# Patient Record
Sex: MALE | Race: WHITE | NOT HISPANIC OR LATINO | Employment: OTHER | URBAN - METROPOLITAN AREA
[De-identification: names, ages, dates, MRNs, and addresses within clinical notes are randomized per-mention and may not be internally consistent; named-entity substitution may affect disease eponyms.]

---

## 2017-01-11 ENCOUNTER — HOSPITAL ENCOUNTER (EMERGENCY)
Facility: HOSPITAL | Age: 62
Discharge: HOME | End: 2017-01-11
Attending: EMERGENCY MEDICINE
Payer: MEDICARE

## 2017-01-11 VITALS
SYSTOLIC BLOOD PRESSURE: 153 MMHG | WEIGHT: 185 LBS | HEART RATE: 85 BPM | RESPIRATION RATE: 20 BRPM | OXYGEN SATURATION: 99 % | TEMPERATURE: 98 F | DIASTOLIC BLOOD PRESSURE: 89 MMHG

## 2017-01-11 DIAGNOSIS — K52.9 GASTROENTERITIS: Primary | ICD-10-CM

## 2017-01-11 PROCEDURE — 99283 EMERGENCY DEPT VISIT LOW MDM: CPT

## 2017-01-11 RX ORDER — ONDANSETRON 4 MG/1
8 TABLET, ORALLY DISINTEGRATING ORAL EVERY 8 HOURS PRN
Qty: 10 TABLET | Refills: 3 | Status: SHIPPED | OUTPATIENT
Start: 2017-01-11 | End: 2017-03-20

## 2017-01-11 RX ORDER — LIDOCAINE 50 MG/G
1 PATCH TOPICAL EVERY 24 HOURS
COMMUNITY

## 2017-01-26 ENCOUNTER — ALLSCRIPTS OFFICE VISIT (OUTPATIENT)
Dept: OTHER | Facility: OTHER | Age: 62
End: 2017-01-26

## 2017-01-26 LAB
FLUAV AG SPEC QL IA: POSITIVE
INFLUENZA B AG (HISTORICAL): NEGATIVE

## 2017-03-20 ENCOUNTER — APPOINTMENT (EMERGENCY)
Dept: CT IMAGING | Facility: HOSPITAL | Age: 62
End: 2017-03-20
Payer: MEDICARE

## 2017-03-20 ENCOUNTER — HOSPITAL ENCOUNTER (EMERGENCY)
Facility: HOSPITAL | Age: 62
Discharge: HOME | End: 2017-03-20
Attending: EMERGENCY MEDICINE
Payer: MEDICARE

## 2017-03-20 VITALS
SYSTOLIC BLOOD PRESSURE: 137 MMHG | WEIGHT: 182.54 LBS | OXYGEN SATURATION: 97 % | TEMPERATURE: 98 F | RESPIRATION RATE: 18 BRPM | HEART RATE: 93 BPM | DIASTOLIC BLOOD PRESSURE: 75 MMHG

## 2017-03-20 DIAGNOSIS — M62.81 MUSCLE WEAKNESS: Primary | ICD-10-CM

## 2017-03-20 DIAGNOSIS — F41.9 ANXIETY: ICD-10-CM

## 2017-03-20 LAB
ALBUMIN SERPL BCP-MCNC: 4.1 G/DL (ref 3.5–5)
ALP SERPL-CCNC: 85 U/L (ref 46–116)
ALT SERPL W P-5'-P-CCNC: 33 U/L (ref 12–78)
ANION GAP SERPL CALCULATED.3IONS-SCNC: 9 MMOL/L (ref 4–13)
APTT PPP: 34 SECONDS (ref 24–36)
AST SERPL W P-5'-P-CCNC: 17 U/L (ref 5–45)
ATRIAL RATE: 107 BPM
BASOPHILS # BLD AUTO: 0.03 THOUSANDS/ΜL (ref 0–0.1)
BASOPHILS NFR BLD AUTO: 0 % (ref 0–1)
BILIRUB SERPL-MCNC: 0.2 MG/DL (ref 0.2–1)
BUN SERPL-MCNC: 12 MG/DL (ref 5–25)
CALCIUM SERPL-MCNC: 9.2 MG/DL (ref 8.3–10.1)
CHLORIDE SERPL-SCNC: 101 MMOL/L (ref 100–108)
CO2 SERPL-SCNC: 30 MMOL/L (ref 21–32)
CREAT SERPL-MCNC: 1.01 MG/DL (ref 0.6–1.3)
EOSINOPHIL # BLD AUTO: 0.11 THOUSAND/ΜL (ref 0–0.61)
EOSINOPHIL NFR BLD AUTO: 1 % (ref 0–6)
ERYTHROCYTE [DISTWIDTH] IN BLOOD BY AUTOMATED COUNT: 14 % (ref 11.6–15.1)
GFR SERPL CREATININE-BSD FRML MDRD: >60 ML/MIN/1.73SQ M
GLUCOSE SERPL-MCNC: 91 MG/DL (ref 65–140)
HCT VFR BLD AUTO: 48.6 % (ref 36.5–49.3)
HGB BLD-MCNC: 15.7 G/DL (ref 12–17)
INR PPP: 1.12 (ref 0.86–1.16)
LYMPHOCYTES # BLD AUTO: 2.19 THOUSANDS/ΜL (ref 0.6–4.47)
LYMPHOCYTES NFR BLD AUTO: 23 % (ref 14–44)
MAGNESIUM SERPL-MCNC: 2.3 MG/DL (ref 1.6–2.6)
MCH RBC QN AUTO: 27.7 PG (ref 26.8–34.3)
MCHC RBC AUTO-ENTMCNC: 32.3 G/DL (ref 31.4–37.4)
MCV RBC AUTO: 86 FL (ref 82–98)
MONOCYTES # BLD AUTO: 0.74 THOUSAND/ΜL (ref 0.17–1.22)
MONOCYTES NFR BLD AUTO: 8 % (ref 4–12)
NEUTROPHILS # BLD AUTO: 6.51 THOUSANDS/ΜL (ref 1.85–7.62)
NEUTS SEG NFR BLD AUTO: 68 % (ref 43–75)
NT-PROBNP SERPL-MCNC: 75 PG/ML
P AXIS: 87 DEGREES
PLATELET # BLD AUTO: 341 THOUSANDS/UL (ref 149–390)
PMV BLD AUTO: 9.2 FL (ref 8.9–12.7)
POTASSIUM SERPL-SCNC: 3.8 MMOL/L (ref 3.5–5.3)
PR INTERVAL: 142 MS
PROT SERPL-MCNC: 7.8 G/DL (ref 6.4–8.2)
PROTHROMBIN TIME: 14.2 SECONDS (ref 12–14.3)
QRS AXIS: 73 DEGREES
QRSD INTERVAL: 64 MS
QT INTERVAL: 304 MS
QTC INTERVAL: 405 MS
RBC # BLD AUTO: 5.67 MILLION/UL (ref 3.88–5.62)
SODIUM SERPL-SCNC: 140 MMOL/L (ref 136–145)
T WAVE AXIS: 70 DEGREES
TROPONIN I SERPL-MCNC: <0.02 NG/ML
VENTRICULAR RATE: 107 BPM
WBC # BLD AUTO: 9.58 THOUSAND/UL (ref 4.31–10.16)

## 2017-03-20 PROCEDURE — 84484 ASSAY OF TROPONIN QUANT: CPT | Performed by: EMERGENCY MEDICINE

## 2017-03-20 PROCEDURE — 93005 ELECTROCARDIOGRAM TRACING: CPT | Performed by: EMERGENCY MEDICINE

## 2017-03-20 PROCEDURE — 99284 EMERGENCY DEPT VISIT MOD MDM: CPT

## 2017-03-20 PROCEDURE — 83880 ASSAY OF NATRIURETIC PEPTIDE: CPT | Performed by: EMERGENCY MEDICINE

## 2017-03-20 PROCEDURE — 80053 COMPREHEN METABOLIC PANEL: CPT | Performed by: EMERGENCY MEDICINE

## 2017-03-20 PROCEDURE — 85610 PROTHROMBIN TIME: CPT | Performed by: EMERGENCY MEDICINE

## 2017-03-20 PROCEDURE — 70450 CT HEAD/BRAIN W/O DYE: CPT

## 2017-03-20 PROCEDURE — 85025 COMPLETE CBC W/AUTO DIFF WBC: CPT | Performed by: EMERGENCY MEDICINE

## 2017-03-20 PROCEDURE — 83735 ASSAY OF MAGNESIUM: CPT | Performed by: EMERGENCY MEDICINE

## 2017-03-20 PROCEDURE — 36415 COLL VENOUS BLD VENIPUNCTURE: CPT | Performed by: EMERGENCY MEDICINE

## 2017-03-20 PROCEDURE — 85730 THROMBOPLASTIN TIME PARTIAL: CPT | Performed by: EMERGENCY MEDICINE

## 2017-04-26 ENCOUNTER — HOSPITAL ENCOUNTER (EMERGENCY)
Facility: HOSPITAL | Age: 62
Discharge: HOME | End: 2017-04-26
Attending: EMERGENCY MEDICINE | Admitting: EMERGENCY MEDICINE
Payer: MEDICARE

## 2017-04-26 VITALS
TEMPERATURE: 97.9 F | RESPIRATION RATE: 18 BRPM | SYSTOLIC BLOOD PRESSURE: 157 MMHG | HEART RATE: 94 BPM | DIASTOLIC BLOOD PRESSURE: 82 MMHG | OXYGEN SATURATION: 98 %

## 2017-04-26 DIAGNOSIS — B35.6 TINEA CRURIS: Primary | ICD-10-CM

## 2017-04-26 PROCEDURE — 99281 EMR DPT VST MAYX REQ PHY/QHP: CPT

## 2017-04-26 RX ORDER — CLOTRIMAZOLE AND BETAMETHASONE DIPROPIONATE 10; .64 MG/G; MG/G
1 CREAM TOPICAL 2 TIMES DAILY
Qty: 28 G | Refills: 0 | Status: SHIPPED | OUTPATIENT
Start: 2017-04-26 | End: 2017-07-01

## 2017-05-11 ENCOUNTER — ALLSCRIPTS OFFICE VISIT (OUTPATIENT)
Dept: OTHER | Facility: OTHER | Age: 62
End: 2017-05-11

## 2017-05-16 ENCOUNTER — ALLSCRIPTS OFFICE VISIT (OUTPATIENT)
Dept: OTHER | Facility: OTHER | Age: 62
End: 2017-05-16

## 2017-07-01 ENCOUNTER — HOSPITAL ENCOUNTER (EMERGENCY)
Facility: HOSPITAL | Age: 62
Discharge: HOME | End: 2017-07-01
Attending: EMERGENCY MEDICINE | Admitting: EMERGENCY MEDICINE
Payer: MEDICARE

## 2017-07-01 VITALS
RESPIRATION RATE: 16 BRPM | HEART RATE: 97 BPM | SYSTOLIC BLOOD PRESSURE: 153 MMHG | WEIGHT: 184.08 LBS | TEMPERATURE: 98 F | DIASTOLIC BLOOD PRESSURE: 81 MMHG | OXYGEN SATURATION: 98 %

## 2017-07-01 DIAGNOSIS — B35.6 TINEA CRURIS: Primary | ICD-10-CM

## 2017-07-01 PROCEDURE — 99282 EMERGENCY DEPT VISIT SF MDM: CPT

## 2017-07-01 RX ORDER — CYCLOBENZAPRINE HCL 10 MG
10 TABLET ORAL 3 TIMES DAILY PRN
COMMUNITY

## 2017-10-19 ENCOUNTER — ALLSCRIPTS OFFICE VISIT (OUTPATIENT)
Dept: OTHER | Facility: OTHER | Age: 62
End: 2017-10-19

## 2017-10-19 LAB — S PYO AG THROAT QL: NEGATIVE

## 2017-10-20 NOTE — PROGRESS NOTES
Assessment  1  History of Acute upper respiratory infection (465 9) (J06 9)   2  Acute upper respiratory infection (465 9) (J06 9)   3  Allergic rhinitis (477 9) (J30 9)   4  Asthma, mild intermittent (493 90) (J45 20)    Plan  Acute upper respiratory infection    · Drink at least 6 glasses of water or juice a day ; Status:Complete;   Done: 69QII1797   · Good hand washing is one of the best ways to control the spread of germs ;  Status:Complete;   Done: 84XVN7532   · Use a cough medicine to help you get adequate rest ; Status:Complete;   Done:  11HPV1033   · Call [phone] if: The fever comes back after being normal for 2 days ;  Status:Complete;   Done: 08AQY3347   · Call [phone] if: The symptoms are not better in 7 days ; Status:Complete;   Done:  75JPS4917   · Call [phone] if: You have a productive cough and are bringing up secretions  (phlegm) ; Status:Complete;   Done: 27VHY7705   · Rapid StrepA- POC; Source:Throat; Status:Active - Perform Order; Requested  YAV:[id number]; Asthma    · ProAir  (90 Base) MCG/ACT Inhalation Aerosol Solution; take 2 puffs  every 4 to 6 hours as needed for wheeze  PMH: History of urticaria    · Benadryl Allergy 25 MG Oral Tablet; TAKE 1 TABLET AT BEDTIME    Discussion/Summary    He has mild sore throat and strep skin is negative he will continue symptomatic care for the respiratory infection  to come back if he has any new symptoms his breathing gets difficult or he has productive cough with fever chills,  The patient was counseled regarding instructions for management,-- impressions,-- importance of compliance with treatment  Possible side effects of new medications were reviewed with the patient/guardian today  The treatment plan was reviewed with the patient/guardian  The patient/guardian understands and agrees with the treatment plan      Chief Complaint  Throat pain, cough, and teary eyes        History of Present Illness  HPI: He complains of sore throat and cough , and sneezing 1 day,, denies fever , took night quil and robitussin to many children , in a game , and people or sneezing and coughing,denies any chest pain shortness of breath he says he needs refill on Benadryl he use it for allergies and he has asthma he use his inhaler sometimes and he used 1 time this morning,       Review of Systems    Constitutional: no fever or chills, feels well, no tiredness, no recent weight loss or weight gain  ENT: no complaints of earache, no loss of hearing, no nosebleeds or nasal discharge, no sore throat or hoarseness  Cardiovascular: no complaints of slow or fast heart rate, no chest pain, no palpitations, no leg claudication or lower extremity edema  Respiratory: no complaints of shortness of breath, no wheezing or cough, no dyspnea on exertion, no orthopnea or PND  Gastrointestinal: no complaints of abdominal pain, no constipation, no nausea or vomiting, no diarrhea or bloody stools  Genitourinary: no complaints of dysuria or incontinence, no hesitancy, no nocturia, no genital lesion, no inadequacy of penile erection  Musculoskeletal: no complaints of arthralgia, no myalgia, no joint swelling or stiffness, no limb pain or swelling  Integumentary: no complaints of skin rash or lesion, no itching or dry skin, no skin wounds  Neurological: no complaints of headache, no confusion, no numbness or tingling, no dizziness or fainting  ROS reviewed  Active Problems  1  Abnormal blood sugar (790 29) (R73 09)   2  Allergic rhinitis (477 9) (J30 9)   3  Allergic urticaria (708 0) (L50 0)   4  Annual physical exam (V70 0) (Z00 00)   5  Asthma (493 90) (J45 909)   6  Asthma, mild intermittent (493 90) (J45 20)   7  Cellulitis (682 9) (L03 90)   8  Chronic back pain (724 5,338 29) (M54 9,G89 29)   9  Colon cancer screening (V76 51) (Z12 11)   10  Hypercalcemia (275 42) (E83 52)   11  Hyperlipidemia (272 4) (E78 5)   12   Influenza A (487 1) (J10 1) 13  Insect bite of hip (916 4,E906 4) (E53 337P,O24  XXXA)   14  Sore throat (462) (J02 9)   15  Tinea cruris (110 3) (B35 6)    Past Medical History  1  History of _   2  History of Acute maxillary sinusitis (461 0) (J01 00)   3  History of Acute upper respiratory infection (465 9) (J06 9)   4  History of Acute upper respiratory infection (465 9) (J06 9)   5  History of Annual physical exam (V70 0) (Z00 00)   6  History of acute sinusitis (V12 69) (Z87 09)   7  History of allergic rhinitis (V12 69) (Z87 09)   8  History of temporomandibular joint disorder (V13 59) (Z87 39)   9  History of tinea cruris (V12 09) (Z86 19)   10  History of Myalgia And Myositis (729 1)   11  History of Well adult on routine health check (V70 0) (Z00 00)  Active Problems And Past Medical History Reviewed: The active problems and past medical history were reviewed and updated today  Family History  Mother    1  No pertinent family history  Father    2  Family history of   Family History Reviewed: The family history was reviewed and updated today  Social History   ·    · Never A Smoker   · No alcohol use   · No drug use   · On disability   · One child  The social history was reviewed and updated today  Current Meds   1  Ambien 10 MG Oral Tablet; 1 PO Q HS; Therapy: 98PUW9994 to  Requested for: 41FAU2705 Recorded   2  Benadryl Allergy 25 MG Oral Tablet; TAKE 1 TABLET AT BEDTIME; Therapy: 41LQL6577 to (Evaluate:2017)  Requested for: 27RRV5413; Last   Rx:83Uij7128 Ordered   3  ClonazePAM 0 5 MG Oral Tablet; 1 PO QID; Therapy: 06MOE7045 to  Requested for: 50VZV4676 Recorded   4  EpiPen 2-Jhonathan 0 3 MG/0 3ML Injection Solution Auto-injector; use as directed; Therapy: 46TBX6265 to (Last Rx:52Bgr6850)  Requested for: 42ZPB7750 Ordered   5  Fluconazole 150 MG Oral Tablet; 1 po one time and repeat after 1 wk; Therapy: 08OYJ1554 to (Last Rx:74Pbo4083)  Requested for: 74AHC0555 Ordered   6   Fluticasone Propionate 50 MCG/ACT Nasal Suspension; Use two (2) spray(s) into EACH   nostril DAILY; Therapy: 26QCJ6498 to (UQYCJGSX:02QVQ7863)  Requested for: 74JIU5784; Last   Rx:81Xce2400 Ordered   7  Nystatin 985747 UNIT/GM External Cream; APPLY 2-3 TIMES DAILY TO AFFECTED   AREA(S); Therapy: 78WMB1349 to (Evaluate:21Jun2017)  Requested for: 59NUD5789; Last   Rx:42Cvz2162 Ordered   8  ProAir  (90 Base) MCG/ACT Inhalation Aerosol Solution; take 2 puffs every 4 to 6   hours as needed for wheeze; Therapy: 58WBN0741 to (Evaluate:08Jun2016)  Requested for: 48ZJS2556; Last   Rx:90Uwt3641 Ordered    The medication list was reviewed and updated today  Allergies  1  Codeine Derivatives   2  Penicillins    Vitals   Recorded: 74TFE2742 11:36AM   Temperature 98 5 F   Heart Rate 92   Respiration 18   Systolic 254   Diastolic 82   Height 5 ft 8 5 in   Weight 188 lb    BMI Calculated 28 17   BSA Calculated 2   O2 Saturation 98     Physical Exam    Constitutional   General appearance: No acute distress, well appearing and well nourished  Eyes   Conjunctiva and lids: No swelling, erythema, or discharge  Ears, Nose, Mouth, and Throat   External inspection of ears and nose: Normal     Otoscopic examination: Tympanic membrance translucent with normal light reflex  Canals patent without erythema  Nasal mucosa, septum, and turbinates: Normal without edema or erythema  Oropharynx: Normal with no erythema, edema, exudate or lesions  Results/Data  PHQ-2 Adult Depression Screening 19Oct2017 11:46AM User, Ahs     Test Name Result Flag Reference   PHQ-2 Adult Depression Score 0     Over the last two weeks, how often have you been bothered by any of the following problems?   Little interest or pleasure in doing things: Not at all - 0  Feeling down, depressed, or hopeless: Not at all - 0   PHQ-2 Adult Depression Screening Negative         Signatures   Electronically signed by : CHRIS Oneal ; Oct 19 2017 12:14PM EST                       (Author)

## 2017-10-23 ENCOUNTER — GENERIC CONVERSION - ENCOUNTER (OUTPATIENT)
Dept: FAMILY MEDICINE CLINIC | Facility: CLINIC | Age: 62
End: 2017-10-23

## 2017-10-23 ENCOUNTER — GENERIC CONVERSION - ENCOUNTER (OUTPATIENT)
Dept: OTHER | Facility: OTHER | Age: 62
End: 2017-10-23

## 2018-01-13 VITALS
DIASTOLIC BLOOD PRESSURE: 80 MMHG | SYSTOLIC BLOOD PRESSURE: 140 MMHG | BODY MASS INDEX: 27.55 KG/M2 | HEART RATE: 110 BPM | RESPIRATION RATE: 16 BRPM | WEIGHT: 186 LBS | HEIGHT: 69 IN

## 2018-01-13 VITALS
RESPIRATION RATE: 18 BRPM | DIASTOLIC BLOOD PRESSURE: 82 MMHG | WEIGHT: 188 LBS | BODY MASS INDEX: 27.85 KG/M2 | TEMPERATURE: 98.5 F | HEART RATE: 92 BPM | SYSTOLIC BLOOD PRESSURE: 138 MMHG | OXYGEN SATURATION: 98 % | HEIGHT: 69 IN

## 2018-01-14 VITALS
DIASTOLIC BLOOD PRESSURE: 80 MMHG | BODY MASS INDEX: 27.7 KG/M2 | HEIGHT: 69 IN | SYSTOLIC BLOOD PRESSURE: 140 MMHG | TEMPERATURE: 98.4 F | HEART RATE: 100 BPM | RESPIRATION RATE: 16 BRPM | WEIGHT: 187 LBS

## 2018-01-14 VITALS
HEART RATE: 112 BPM | RESPIRATION RATE: 16 BRPM | BODY MASS INDEX: 27.55 KG/M2 | SYSTOLIC BLOOD PRESSURE: 140 MMHG | DIASTOLIC BLOOD PRESSURE: 80 MMHG | HEIGHT: 69 IN | TEMPERATURE: 98.8 F | WEIGHT: 186 LBS

## 2018-01-15 NOTE — RESULT NOTES
Verified Results  (1) COMPREHENSIVE METABOLIC PANEL 49BKX5598 03:70IC Kvng Khan     Test Name Result Flag Reference   GLUCOSE 108 mg/dL H 65-99   Fasting reference interval   UREA NITROGEN (BUN) 11 mg/dL  7-25   CREATININE 0 90 mg/dL  0 70-1 33   For patients >52years of age, the reference limit  for Creatinine is approximately 13% higher for people  identified as -American  eGFR NON-AFR  AMERICAN 93 mL/min/1 73m2  > OR = 60   eGFR AFRICAN AMERICAN 108 mL/min/1 73m2  > OR = 60   BUN/CREATININE RATIO   1-41   NOT APPLICABLE (calc)   SODIUM 139 mmol/L  135-146   POTASSIUM 4 1 mmol/L  3 5-5 3   CHLORIDE 104 mmol/L     CARBON DIOXIDE 24 mmol/L  19-30   CALCIUM 9 2 mg/dL  8 6-10 3   PROTEIN, TOTAL 6 8 g/dL  6 1-8 1   ALBUMIN 4 3 g/dL  3 6-5 1   GLOBULIN 2 5 g/dL (calc)  1 9-3 7   ALBUMIN/GLOBULIN RATIO 1 7 (calc)  1 0-2 5   BILIRUBIN, TOTAL 0 6 mg/dL  0 2-1 2   ALKALINE PHOSPHATASE 76 U/L     AST 16 U/L  10-35   ALT 18 U/L  9-46     (1) LIPID PANEL, FASTING 21Jun2016 09:14AM Kvng Bill     Test Name Result Flag Reference   CHOLESTEROL, TOTAL 227 mg/dL H 125-200   HDL CHOLESTEROL 44 mg/dL  > OR = 40   TRIGLICERIDES 014 mg/dL  <150   LDL-CHOLESTEROL 162 mg/dL (calc) H <130   Desirable range <100 mg/dL for patients with CHD or  diabetes and <70 mg/dL for diabetic patients with  known heart disease  CHOL/HDLC RATIO 5 2 (calc) H < OR = 5 0   NON HDL CHOLESTEROL 183 mg/dL (calc) H    Target for non-HDL cholesterol is 30 mg/dL higher than   LDL cholesterol target       (Q) CBC (H/H, RBC, INDICES, WBC, PLT) 08JVG3087 09:14AM Kvng Seamanony     Test Name Result Flag Reference   WHITE BLOOD CELL COUNT 8 1 Thousand/uL  3 8-10 8   RED BLOOD CELL COUNT 5 47 Million/uL  4 20-5 80   HEMOGLOBIN 15 0 g/dL  13 2-17 1   HEMATOCRIT 46 9 %  38 5-50 0   MCV 85 8 fL  80 0-100 0   MCH 27 5 pg  27 0-33 0   MCHC 32 0 g/dL  32 0-36 0   RDW 14 4 %  11 0-15 0   PLATELET COUNT 519 Thousand/uL  140-400   MPV 8 6 fL 7  5-11 5   WE RECEIVED YOUR HANDWRITTEN TEST ORDER AND  PERFORMED A HEMOGRAM WITH A PLATELET WITHOUT  A DIFFERENTIAL  IF THIS IS NOT WHAT YOU INTENDED  TO ORDER, PLEASE CONTACT YOUR LOCAL CLIENT SERVICE  REPRESENTATIVE IMMEDIATELY SO THAT WE CAN   ADJUST OUR BILLING APPROPRIATELY   YOU MAY ALSO   INQUIRE ABOUT ALTERNATIVE OR ADDITIONAL TESTING            (Q) TSH, 3RD GENERATION 21Jun2016 09:14AM Taryn Elkins   REPORT COMMENT:  FASTING:YES     Test Name Result Flag Reference   TSH 1 05 mIU/L  0 40-4 50

## 2018-01-22 VITALS — HEART RATE: 86 BPM

## 2018-01-22 VITALS
BODY MASS INDEX: 27.85 KG/M2 | DIASTOLIC BLOOD PRESSURE: 70 MMHG | HEART RATE: 108 BPM | SYSTOLIC BLOOD PRESSURE: 130 MMHG | TEMPERATURE: 98.8 F | WEIGHT: 188 LBS | OXYGEN SATURATION: 98 % | HEIGHT: 69 IN | RESPIRATION RATE: 18 BRPM

## 2018-02-01 ENCOUNTER — HOSPITAL ENCOUNTER (EMERGENCY)
Facility: HOSPITAL | Age: 63
Discharge: HOME | End: 2018-02-01
Attending: EMERGENCY MEDICINE | Admitting: EMERGENCY MEDICINE
Payer: MEDICARE

## 2018-02-01 VITALS
DIASTOLIC BLOOD PRESSURE: 97 MMHG | RESPIRATION RATE: 18 BRPM | TEMPERATURE: 98.2 F | BODY MASS INDEX: 27.72 KG/M2 | OXYGEN SATURATION: 100 % | WEIGHT: 185 LBS | HEART RATE: 86 BPM | SYSTOLIC BLOOD PRESSURE: 168 MMHG

## 2018-02-01 DIAGNOSIS — B37.89 CANDIDA RASH OF GROIN: Primary | ICD-10-CM

## 2018-02-01 PROCEDURE — 99282 EMERGENCY DEPT VISIT SF MDM: CPT

## 2018-02-01 RX ORDER — NYSTATIN 100000 U/G
OINTMENT TOPICAL 2 TIMES DAILY
Qty: 30 G | Refills: 0 | Status: SHIPPED | OUTPATIENT
Start: 2018-02-01 | End: 2018-02-26 | Stop reason: ALTCHOICE

## 2018-02-01 NOTE — DISCHARGE INSTRUCTIONS
Skin Yeast Infection   AMBULATORY CARE:   What do I need to know about a skin yeast infection? Yeast is normally present on the skin  Infection happens when you have too much yeast, or when it gets into a cut on your skin  Certain types of mold and fungus can cause a yeast infection  A skin yeast infection can appear anywhere on your skin or nail beds  Skin yeast infections are usually found on warm, moist parts of the body  Examples include between skin folds or under the breasts  Common symptoms include the following:  Signs and symptoms will depend on the type of yeast causing the infection, and where the infection is located  · Red, scaly skin    · Changes in skin color, especially a beefy red color    · Itching, dry skin    · Painful, cracking skin at the corners of your mouth    · Thick, discolored, chipping nails    · Skin lesions that may be red or purple and round    · Pus bumps  Seek care immediately if:   · You have signs of infection, such as pus, warmth or red streaks coming from the wound, or a fever  Contact your healthcare provider if:   · Your symptoms worsen or do not get better within 7 to 10 days  · You have new or returning signs of a skin yeast infection after treatment  · You have questions or concerns about your condition or care  Treatment for a skin yeast infection  may include antifungal medicine to treat the fungal infection  The medicine be given as a cream, ointment, or pill  Care for the skin near the infection:  You may only have discolored patches of skin, or areas that are dry and flaking  Care for these skin problems as directed by your healthcare provider  If you have painful skin or an open sore, you will need to protect the skin and prevent damage  You will also need to keep the skin dry as much as possible  Ask your healthcare provider how to care for your skin while the infection clears   The following are general guidelines for caring for painful or open skin:  · Keep the skin clean  Ask your healthcare provider if you should wash with mild soap and water  Do not use soap that contains alcohol  Alcohol can dry and irritate the skin and make symptoms worse  Your baby's healthcare provider may tell you to use diaper cream or ointment when you change his diaper  This will protect the skin and prevent moisture from collecting  · Keep the skin dry  Pat the area dry with a towel  Do not rub, because this may irritate the skin  If you have a skin yeast infection between skin folds, lift the top part gently and hold it while you dry between your skin folds  Always dry your feet completely after you swim or bathe, including between your toes  Dry your skin if you are sweating from exercise or exposure to heat  Use a clean towel each time to prevent spreading or continuing the infection  · Keep the skin protected  Ask your healthcare provider if you should cover the area with a bandage or leave it open  Check your skin each day to make sure you do not have new or worsening problems  You may need to have someone check the skin if you cannot see the area easily  Prevent another skin yeast infection:   · Do not share clothing or towels    · Wear shower shoes if you need to use a public shower    · Dry your feet completely after you bathe, and apply antifungal powder or cream as directed    · Put on socks before you get dressed so you do not spread fungus from your feet    · Wear light clothing that allows air to get to your skin    · Manage your weight to prevent skin folds where yeast can collect    · Manage diabetes    · Change your baby's diaper often, and keep the area clean and dry as much as possible    · Use a diaper cream or ointment that contains zinc oxide or dimethicone on your baby's diaper area as directed  Follow up with your healthcare provider as directed:  Write down your questions so you remember to ask them during your visits     © 2017 [facility] [address] [facility] is for End User's use only and may not be sold, redistributed or otherwise used for commercial purposes  All illustrations and images included in CareNotes® are the copyrighted property of [employer]  or [address]  The above information is an  only  It is not intended as medical advice for individual conditions or treatments  Talk to your doctor, nurse or pharmacist before following any medical regimen to see if it is safe and effective for you

## 2018-02-01 NOTE — ED PROVIDER NOTES
History  Chief Complaint   Patient presents with    Rash     Pt  with red itchy rash on upper legs, started this am  Pt  reports taking a benedryl and hydrocortisone this am       History provided by:  Patient  Rash   Location: bilateral ingunial/groin  Quality: itchiness and redness    Severity:  Moderate  Onset quality:  Gradual  Duration:  1 day  Timing:  Constant  Progression:  Unchanged  Chronicity:  New  Context comment:  Patient convinced it started after sitting on a public restroom toilet yesterday, patient very anxious about this  Relieved by:  Nothing  Worsened by:  Nothing  Ineffective treatments: Tried topical steroid and oral Benadryl  Associated symptoms: no abdominal pain, no diarrhea, no fever, no headaches, no nausea, no shortness of breath, no sore throat, not vomiting and not wheezing        Prior to Admission Medications   Prescriptions Last Dose Informant Patient Reported? Taking? ClonazePAM (KLONOPIN PO)   Yes No   Sig: Take by mouth  Menthol, Topical Analgesic, (Flexall 454) 16 % GEL   Yes No   Sig: Apply topically  cyclobenzaprine (FLEXERIL) 10 mg tablet   Yes No   Sig: Take 10 mg by mouth 3 (three) times a day as needed for muscle spasms   lidocaine (LIDODERM) 5 %   Yes No   Sig: Place 1 patch on the skin every 24 hours Remove & Discard patch within 12 hours or as directed by MD   terbinafine (LamISIL) 1 % cream   No No   Sig: Apply 1 application topically 2 (two) times a day for 7 days   zolpidem (AMBIEN CR) 12 5 MG CR tablet   Yes No   Sig: Take 12 5 mg by mouth daily at bedtime as needed for sleep  Facility-Administered Medications: None       Past Medical History:   Diagnosis Date    Anxiety     Asthma     Insomnia     Kidney stone     Pain     chronic neck,back, spine       Past Surgical History:   Procedure Laterality Date    KIDNEY STONE SURGERY      pt poor historian       History reviewed  No pertinent family history    I have reviewed and agree with the history as documented  Social History   Substance Use Topics    Smoking status: Never Smoker    Smokeless tobacco: Never Used    Alcohol use No        Review of Systems   Constitutional: Negative for activity change, chills, diaphoresis and fever  HENT: Negative for congestion, sinus pressure and sore throat  Eyes: Negative for pain and visual disturbance  Respiratory: Negative for cough, chest tightness, shortness of breath, wheezing and stridor  Cardiovascular: Negative for chest pain and palpitations  Gastrointestinal: Negative for abdominal distention, abdominal pain, constipation, diarrhea, nausea and vomiting  Genitourinary: Negative for dysuria and frequency  Musculoskeletal: Negative for neck pain and neck stiffness  Skin: Positive for rash  Neurological: Negative for dizziness, speech difficulty, light-headedness, numbness and headaches  Physical Exam  ED Triage Vitals [02/01/18 1002]   Temperature Pulse Respirations Blood Pressure SpO2   98 2 °F (36 8 °C) 86 18 168/97 100 %      Temp Source Heart Rate Source Patient Position - Orthostatic VS BP Location FiO2 (%)   Oral Monitor Sitting Right arm --      Pain Score       No Pain           Orthostatic Vital Signs  Vitals:    02/01/18 1002   BP: 168/97   Pulse: 86   Patient Position - Orthostatic VS: Sitting       Physical Exam   Constitutional: He is oriented to person, place, and time  He appears well-developed  No distress  HENT:   Head: Normocephalic and atraumatic  Eyes: Pupils are equal, round, and reactive to light  Neck: Normal range of motion  Neck supple  No tracheal deviation present  Pulmonary/Chest: Effort normal  No stridor  No respiratory distress  Musculoskeletal: Normal range of motion  Neurological: He is alert and oriented to person, place, and time  Skin: Skin is warm and dry  Rash noted  He is not diaphoretic  No erythema  No pallor     Patient with an erythematous rash by lying on all intrinsic areas in the groin, satellite lesions consistent with fungal infection   Psychiatric: His mood appears anxious  Vitals reviewed  ED Medications  Medications - No data to display    Diagnostic Studies  Results Reviewed     None                 No orders to display              Procedures  Procedures       Phone Contacts  ED Phone Contact    ED Course  ED Course                                MDM  Number of Diagnoses or Management Options  Candida rash of groin: new and requires workup  Diagnosis management comments: Patient extremely anxious and very repetitive in his questioning of if this could be caused by him sitting on a public toilet yesterday, I do believe this has nothing to do with this, I also suspect this is been there longer than 1 day, this appears to be a fungal infection, will discharge on nystatin cream, patient agrees to call and follow up this PCP in 1 week if symptoms do not resolve       Amount and/or Complexity of Data Reviewed  Review and summarize past medical records: yes      CritCare Time    Disposition  Final diagnoses:   Candida rash of groin     Time reflects when diagnosis was documented in both MDM as applicable and the Disposition within this note     Time User Action Codes Description Comment    2/1/2018 10:10 AM Justin Camacho [[id number]] Candida rash of groin       ED Disposition     ED Disposition Condition Comment    Discharge  Berneta Light discharge to home/self care  Condition at discharge: Good        Follow-up Information    None       Patient's Medications   Discharge Prescriptions    NYSTATIN (MYCOSTATIN) OINTMENT    Apply topically 2 (two) times a day       Start Date: 2/1/2018  End Date: --       Order Dose: --       Quantity: 30 g    Refills: 0     No discharge procedures on file      ED Provider  Electronically Signed by           Wesley Matos DO  02/01/18 1016

## 2018-02-15 ENCOUNTER — HOSPITAL ENCOUNTER (EMERGENCY)
Facility: HOSPITAL | Age: 63
Discharge: HOME | End: 2018-02-15
Attending: EMERGENCY MEDICINE | Admitting: EMERGENCY MEDICINE
Payer: MEDICARE

## 2018-02-15 VITALS
TEMPERATURE: 98.6 F | OXYGEN SATURATION: 100 % | SYSTOLIC BLOOD PRESSURE: 174 MMHG | WEIGHT: 182.98 LBS | BODY MASS INDEX: 27.42 KG/M2 | RESPIRATION RATE: 18 BRPM | HEART RATE: 99 BPM | DIASTOLIC BLOOD PRESSURE: 80 MMHG

## 2018-02-15 DIAGNOSIS — B35.4 TINEA CORPORIS: Primary | ICD-10-CM

## 2018-02-15 PROCEDURE — 99282 EMERGENCY DEPT VISIT SF MDM: CPT

## 2018-02-15 RX ORDER — CLOTRIMAZOLE 1 %
CREAM (GRAM) TOPICAL 2 TIMES DAILY
Qty: 60 G | Refills: 3 | Status: SHIPPED | OUTPATIENT
Start: 2018-02-15 | End: 2018-12-10

## 2018-02-15 NOTE — DISCHARGE INSTRUCTIONS
Diagnosis: tinea corporis fungal skin infection     - please do not scratch - do not want a secondary bacterial infection     - clotrimazole ointment- apply to area 2 times per day for next 2-3 weeks- or 1 week after itching is gone     - these can become recurrent     - if this continuess to become a problem-- please call  t he dermatologist to schedule an appt- sometimes can take awhile to get in     - please return to  The er for any new/ worsening/concerning symptoms to you

## 2018-02-15 NOTE — ED PROVIDER NOTES
History  Chief Complaint   Patient presents with    Rash     c/o rash located on bilateral inner thighs x 2 days  Pt had similar rash approx 2 weeks ago "and the cream you guys gave me took it away but it came back yesterday"  Pt denies N/V/D, fever/chills or any other complaints at present time  65 yr  Male with previous hx of skin rash 2-3 weeks ago-- given rx for anti- fungal -----  Return to er same -- bilateral itchy groin rash -- no other rash- no fever-systemic comps        History provided by:  Patient   used: No        Prior to Admission Medications   Prescriptions Last Dose Informant Patient Reported? Taking? ClonazePAM (KLONOPIN PO)   Yes No   Sig: Take by mouth  Menthol, Topical Analgesic, (Flexall 454) 16 % GEL   Yes No   Sig: Apply topically  cyclobenzaprine (FLEXERIL) 10 mg tablet   Yes No   Sig: Take 10 mg by mouth 3 (three) times a day as needed for muscle spasms   lidocaine (LIDODERM) 5 %   Yes No   Sig: Place 1 patch on the skin every 24 hours Remove & Discard patch within 12 hours or as directed by MD   nystatin (MYCOSTATIN) ointment   No No   Sig: Apply topically 2 (two) times a day   terbinafine (LamISIL) 1 % cream   No No   Sig: Apply 1 application topically 2 (two) times a day for 7 days   zolpidem (AMBIEN CR) 12 5 MG CR tablet   Yes No   Sig: Take 12 5 mg by mouth daily at bedtime as needed for sleep  Facility-Administered Medications: None       Past Medical History:   Diagnosis Date    Anxiety     Asthma     Insomnia     Kidney stone     Pain     chronic neck,back, spine    Psychiatric disorder        Past Surgical History:   Procedure Laterality Date    KIDNEY STONE SURGERY      pt poor historian       History reviewed  No pertinent family history  I have reviewed and agree with the history as documented      Social History   Substance Use Topics    Smoking status: Never Smoker    Smokeless tobacco: Never Used    Alcohol use No        Review of Systems   Constitutional: Negative  HENT: Negative  Eyes: Negative  Respiratory: Negative  Cardiovascular: Negative  Gastrointestinal: Negative  Endocrine: Negative  Genitourinary: Negative  Musculoskeletal: Negative  Skin: Positive for rash  Negative for color change, pallor and wound  Allergic/Immunologic: Negative  Neurological: Negative  Hematological: Negative  Psychiatric/Behavioral: Negative  Physical Exam  ED Triage Vitals [02/15/18 0810]   Temperature Pulse Respirations Blood Pressure SpO2   98 6 °F (37 °C) 99 18 (!) 174/80 100 %      Temp Source Heart Rate Source Patient Position - Orthostatic VS BP Location FiO2 (%)   Oral Monitor Lying Right arm --      Pain Score       No Pain           Orthostatic Vital Signs  Vitals:    02/15/18 0810   BP: (!) 174/80   Pulse: 99   Patient Position - Orthostatic VS: Lying       Physical Exam   Constitutional: He is oriented to person, place, and time  He appears well-developed and well-nourished  No distress  avss- htnsive-- pulse ox 100 % on ra- intepretation is normal-- no intervention    HENT:   Head: Normocephalic and atraumatic  Eyes: Conjunctivae and EOM are normal  Pupils are equal, round, and reactive to light  Right eye exhibits no discharge  Left eye exhibits no discharge  No scleral icterus  Mm pink   Neck: Normal range of motion  Neck supple  No JVD present  No tracheal deviation present  No thyromegaly present  Cardiovascular: Normal rate, regular rhythm, normal heart sounds and intact distal pulses  Exam reveals no gallop and no friction rub  No murmur heard  Pulmonary/Chest: Effort normal and breath sounds normal  No stridor  No respiratory distress  He has no wheezes  He has no rales  He exhibits no tenderness  Abdominal: Soft  Bowel sounds are normal  He exhibits no distension and no mass  There is no tenderness  There is no rebound and no guarding  No hernia     Genitourinary: Penis normal  Genitourinary Comments: Bilateral inguinal area dry erythema with satellite lesions-- no signs of cellulitis/ fournierre's -- normal  Perineal exam   Musculoskeletal: Normal range of motion  He exhibits no edema, tenderness or deformity  Lymphadenopathy:     He has no cervical adenopathy  Neurological: He is alert and oriented to person, place, and time  No cranial nerve deficit or sensory deficit  He exhibits normal muscle tone  Coordination normal    Skin: Capillary refill takes less than 2 seconds  Rash noted  He is not diaphoretic  There is erythema  No pallor  Psychiatric: He has a normal mood and affect  His behavior is normal  Judgment and thought content normal    Nursing note and vitals reviewed  ED Medications  Medications - No data to display    Diagnostic Studies  Results Reviewed     None                 No orders to display              Procedures  Procedures       Phone Contacts  ED Phone Contact    ED Course  ED Course                                MDM  CritCare Time    Disposition  Final diagnoses:   Tinea corporis     Time reflects when diagnosis was documented in both MDM as applicable and the Disposition within this note     Time User Action Codes Description Comment    2/15/2018  8:28 AM Precious MANDUJANO Add [B35 4] Tinea corporis       ED Disposition     ED Disposition Condition Comment    [facility]  Condition at discharge: Good        Follow-up Information    None       Patient's Medications   Discharge Prescriptions    CLOTRIMAZOLE (LOTRIMIN) 1 % CREAM    Apply topically 2 (two) times a day       Start Date: 2/15/2018 End Date: --       Order Dose: --       Quantity: 60 g    Refills: 3     No discharge procedures on file      ED Provider  Electronically Signed by           Antonio Whitley MD  02/15/18 1999

## 2018-02-22 DIAGNOSIS — J30.89 ALLERGIC RHINITIS DUE TO OTHER ALLERGIC TRIGGER, UNSPECIFIED CHRONICITY, UNSPECIFIED SEASONALITY: Primary | ICD-10-CM

## 2018-02-22 RX ORDER — FLUTICASONE PROPIONATE 50 MCG
SPRAY, SUSPENSION (ML) NASAL
Qty: 1 BOTTLE | Refills: 0 | Status: SHIPPED | OUTPATIENT
Start: 2018-02-22 | End: 2018-02-26

## 2018-02-26 ENCOUNTER — OFFICE VISIT (OUTPATIENT)
Dept: FAMILY MEDICINE CLINIC | Facility: CLINIC | Age: 63
End: 2018-02-26
Payer: MEDICARE

## 2018-02-26 VITALS
HEIGHT: 69 IN | TEMPERATURE: 98 F | BODY MASS INDEX: 27.4 KG/M2 | OXYGEN SATURATION: 99 % | RESPIRATION RATE: 16 BRPM | DIASTOLIC BLOOD PRESSURE: 72 MMHG | SYSTOLIC BLOOD PRESSURE: 120 MMHG | WEIGHT: 185 LBS | HEART RATE: 96 BPM

## 2018-02-26 DIAGNOSIS — T78.40XS ALLERGIC DISORDER, SEQUELA: ICD-10-CM

## 2018-02-26 DIAGNOSIS — J45.20 MILD INTERMITTENT ASTHMA WITHOUT COMPLICATION: Primary | ICD-10-CM

## 2018-02-26 DIAGNOSIS — J02.9 PHARYNGITIS, UNSPECIFIED ETIOLOGY: ICD-10-CM

## 2018-02-26 PROBLEM — T78.40XA ALLERGIC: Status: ACTIVE | Noted: 2018-02-26

## 2018-02-26 PROBLEM — B35.6 TINEA CRURIS: Status: ACTIVE | Noted: 2017-05-16

## 2018-02-26 PROBLEM — L50.0 ALLERGIC URTICARIA: Status: ACTIVE | Noted: 2017-05-11

## 2018-02-26 LAB — S PYO AG THROAT QL: NEGATIVE

## 2018-02-26 PROCEDURE — 99214 OFFICE O/P EST MOD 30 MIN: CPT | Performed by: FAMILY MEDICINE

## 2018-02-26 PROCEDURE — 87880 STREP A ASSAY W/OPTIC: CPT | Performed by: FAMILY MEDICINE

## 2018-02-26 RX ORDER — D-METHORPHAN/PE/ACETAMINOPHEN 5-325MG/15
25 LIQUID (ML) ORAL
Qty: 30 CAPSULE | Refills: 1 | Status: SHIPPED | OUTPATIENT
Start: 2018-02-26

## 2018-02-26 RX ORDER — FLUTICASONE PROPIONATE 50 MCG
SPRAY, SUSPENSION (ML) NASAL
COMMUNITY
Start: 2014-07-16 | End: 2018-02-26 | Stop reason: SDUPTHER

## 2018-02-26 RX ORDER — D-METHORPHAN/PE/ACETAMINOPHEN 5-325MG/15
LIQUID (ML) ORAL
Refills: 3 | COMMUNITY
Start: 2018-01-24 | End: 2018-02-26 | Stop reason: SDUPTHER

## 2018-02-26 RX ORDER — ALBUTEROL SULFATE 90 UG/1
AEROSOL, METERED RESPIRATORY (INHALATION)
COMMUNITY
Start: 2014-03-06 | End: 2018-02-26 | Stop reason: SDUPTHER

## 2018-02-26 RX ORDER — ZOLPIDEM TARTRATE 10 MG/1
TABLET ORAL
COMMUNITY
Start: 2018-02-25

## 2018-02-26 RX ORDER — ALBUTEROL SULFATE 90 UG/1
2 AEROSOL, METERED RESPIRATORY (INHALATION) EVERY 6 HOURS PRN
Qty: 1 INHALER | Refills: 0 | Status: SHIPPED | OUTPATIENT
Start: 2018-02-26 | End: 2019-01-14 | Stop reason: SDUPTHER

## 2018-02-26 RX ORDER — CLONAZEPAM 0.5 MG/1
TABLET, ORALLY DISINTEGRATING ORAL
COMMUNITY
Start: 2018-02-25 | End: 2018-02-26

## 2018-02-26 RX ORDER — FLUTICASONE PROPIONATE 50 MCG
2 SPRAY, SUSPENSION (ML) NASAL DAILY
Qty: 16 G | Refills: 2 | Status: SHIPPED | OUTPATIENT
Start: 2018-02-26 | End: 2018-07-01 | Stop reason: SDUPTHER

## 2018-02-26 RX ORDER — CLONAZEPAM 0.5 MG/1
TABLET ORAL 4 TIMES DAILY PRN
COMMUNITY
Start: 2011-12-19

## 2018-02-26 RX ORDER — IBUPROFEN 200 MG
200 TABLET ORAL EVERY 6 HOURS PRN
COMMUNITY
Start: 2017-10-23

## 2018-02-26 RX ORDER — EPINEPHRINE 0.3 MG/.3ML
INJECTION SUBCUTANEOUS
COMMUNITY
Start: 2017-05-11 | End: 2019-01-14 | Stop reason: SDUPTHER

## 2018-02-26 NOTE — ASSESSMENT & PLAN NOTE
Mild sore throat since yesterday and improved with gargles, strep test done in the office which is negative, continue symptomatic care

## 2018-02-26 NOTE — ASSESSMENT & PLAN NOTE
Asthma stable he is not using any maintenance inhaler and refill given on albuterol ProAir if needed

## 2018-02-26 NOTE — PROGRESS NOTES
Assessment/Plan:    Pharyngitis  Mild sore throat since yesterday and improved with gargles, strep test done in the office which is negative, continue symptomatic care      Asthma, mild intermittent  Asthma stable he is not using any maintenance inhaler and refill given on albuterol ProAir if needed    Allergic  He get seasonal allergies and takes Benadryl when needed and needs a refill on Flonase  /72   Pulse 96   Temp 98 °F (36 7 °C)   Resp 16   Ht 5' 8 5" (1 74 m)   Wt 83 9 kg (185 lb)   SpO2 99%   BMI 27 72 kg/m²        Diagnoses and all orders for this visit:    Mild intermittent asthma without complication  -     albuterol (PROAIR HFA) 90 mcg/act inhaler; Inhale 2 puffs every 6 (six) hours as needed for wheezing    Pharyngitis, unspecified etiology  -     POCT rapid strep A    Allergic disorder, sequela  -     fluticasone (FLONASE) 50 mcg/act nasal spray; 2 sprays into each nostril daily  -     [employer] ALLERGY 25 MG capsule; Take 1 capsule (25 mg total) by mouth daily at bedtime    Other orders  -     Mometasone Furoate (ASMANEX HFA) 100 MCG/ACT AERO; Inhale 2 puffs 2 (two) times a day  -     Discontinue: DiphenhydrAMINE HCl (BENADRYL ALLERGY PO); Take 1 tablet by mouth  -     Discontinue: clonazePAM (KlonoPIN) 0 5 MG disintegrating tablet;   -     Discontinue: [employer] ALLERGY 25 MG capsule; Take by mouth daily at bedtime  -     EPINEPHrine (EPIPEN 2-ARUN) 0 3 mg/0 3 mL SOAJ; Inject as directed  -     ibuprofen (MOTRIN) 600 mg tablet; Take by mouth  -     Discontinue: albuterol (PROAIR HFA) 90 mcg/act inhaler; Inhale  -     zolpidem (AMBIEN) 10 mg tablet;   -     clonazePAM (KlonoPIN) 0 5 mg tablet; Take by mouth 4 (four) times a day as needed  -     Discontinue: fluticasone (FLONASE) 50 mcg/act nasal spray; into each nostril          Blood pressure 120/72, pulse 96, temperature 98 °F (36 7 °C), resp  rate 16, height 5' 8 5" (1 74 m), weight 83 9 kg (185 lb), SpO2 99 %        Subjective:      Patient ID: Alexis Price is a 65 y o  male  Long with sore throat he also says he needs refill for asthma inhaler he just used for rescue and has been stable he says he does not use any Asmanex  He has seasonal allergies which have been stable and needs a refill on his Benadryl and Flonase  He says he had complete blood work less than year ago when he went to the ED      Sore Throat    This is a new problem  The current episode started yesterday  The problem has been gradually improving  Neither side of throat is experiencing more pain than the other  There has been no fever  The patient is experiencing no pain  Associated symptoms include headaches  Pertinent negatives include no abdominal pain, congestion, coughing, diarrhea, drooling, ear discharge, ear pain, hoarse voice, plugged ear sensation, neck pain, shortness of breath, stridor, swollen glands, trouble swallowing or vomiting  He has had no exposure to strep  He has tried gargles for the symptoms  The treatment provided mild relief  He had the flu vaccine      The following portions of the patient's history were reviewed and updated as appropriate: allergies, current medications, past family history, past medical history, past social history, past surgical history and problem list     Review of Systems   Constitutional: Negative for activity change, appetite change, chills, diaphoresis, fatigue, fever and unexpected weight change  HENT: Positive for sore throat  Negative for congestion, dental problem, drooling, ear discharge, ear pain, facial swelling, hearing loss, hoarse voice, mouth sores, nosebleeds, postnasal drip, rhinorrhea, sinus pain, sinus pressure, sneezing, trouble swallowing and voice change  Eyes: Negative for photophobia, pain, discharge, redness and itching  Respiratory: Negative for cough, chest tightness, shortness of breath, wheezing and stridor  Cardiovascular: Negative for chest pain, palpitations and leg swelling  Gastrointestinal: Negative for abdominal distention, abdominal pain, blood in stool, constipation, diarrhea, nausea and vomiting  Endocrine: Negative for cold intolerance, heat intolerance, polydipsia, polyphagia and polyuria  Genitourinary: Negative for dysuria, flank pain, frequency, hematuria and urgency  Musculoskeletal: Negative for arthralgias, back pain, myalgias and neck pain  Skin: Negative for color change and pallor  Allergic/Immunologic: Negative for environmental allergies and food allergies  Neurological: Positive for headaches  Negative for dizziness, weakness, light-headedness and numbness  Hematological: Negative for adenopathy  Does not bruise/bleed easily  Psychiatric/Behavioral: Negative for behavioral problems, sleep disturbance and suicidal ideas  The patient is not nervous/anxious  Objective:     Physical Exam   Constitutional: He is oriented to person, place, and time  He appears well-developed and well-nourished  HENT:   Head: Normocephalic and atraumatic  Right Ear: External ear normal    Left Ear: External ear normal    Nose: Nose normal    Mouth/Throat: Oropharynx is clear and moist  No oropharyngeal exudate  Eyes: Conjunctivae and EOM are normal  Pupils are equal, round, and reactive to light  Right eye exhibits no discharge  Left eye exhibits no discharge  No scleral icterus  Neck: Normal range of motion  Neck supple  No tracheal deviation present  No thyromegaly present  Cardiovascular: Normal rate, regular rhythm and normal heart sounds  No murmur heard  Pulmonary/Chest: Effort normal and breath sounds normal  No respiratory distress  He has no wheezes  He has no rales  Musculoskeletal: Normal range of motion  He exhibits no edema  Lymphadenopathy:     He has no cervical adenopathy  Neurological: He is alert and oriented to person, place, and time  He has normal reflexes  No cranial nerve deficit  Skin: Skin is warm  No rash noted   No erythema  No pallor  Psychiatric: He has a normal mood and affect   His behavior is normal  Judgment and thought content normal      rapid strep screen is negative

## 2018-07-01 DIAGNOSIS — T78.40XS ALLERGIC DISORDER, SEQUELA: ICD-10-CM

## 2018-07-01 RX ORDER — FLUTICASONE PROPIONATE 50 MCG
2 SPRAY, SUSPENSION (ML) NASAL DAILY
Qty: 1 BOTTLE | Refills: 2 | Status: SHIPPED | OUTPATIENT
Start: 2018-07-01 | End: 2018-10-15 | Stop reason: SDUPTHER

## 2018-10-15 DIAGNOSIS — T78.40XS ALLERGIC DISORDER, SEQUELA: ICD-10-CM

## 2018-10-15 RX ORDER — FLUTICASONE PROPIONATE 50 MCG
SPRAY, SUSPENSION (ML) NASAL
Qty: 1 BOTTLE | Refills: 2 | Status: SHIPPED | OUTPATIENT
Start: 2018-10-15 | End: 2019-01-14 | Stop reason: SDUPTHER

## 2018-12-10 ENCOUNTER — HOSPITAL ENCOUNTER (EMERGENCY)
Facility: HOSPITAL | Age: 63
Discharge: HOME | End: 2018-12-10
Attending: EMERGENCY MEDICINE
Payer: MEDICARE

## 2018-12-10 VITALS
RESPIRATION RATE: 18 BRPM | BODY MASS INDEX: 27.87 KG/M2 | TEMPERATURE: 98 F | DIASTOLIC BLOOD PRESSURE: 92 MMHG | OXYGEN SATURATION: 98 % | SYSTOLIC BLOOD PRESSURE: 161 MMHG | HEART RATE: 108 BPM | WEIGHT: 186 LBS

## 2018-12-10 DIAGNOSIS — B35.6 TINEA CRURIS: Primary | ICD-10-CM

## 2018-12-10 DIAGNOSIS — T78.40XA ALLERGIC DISORDER, INITIAL ENCOUNTER: ICD-10-CM

## 2018-12-10 PROCEDURE — 99282 EMERGENCY DEPT VISIT SF MDM: CPT

## 2018-12-10 RX ORDER — PREDNISONE 1 MG/1
TABLET ORAL
Qty: 15 TABLET | Refills: 0 | Status: SHIPPED | OUTPATIENT
Start: 2018-12-10 | End: 2019-01-14 | Stop reason: ALTCHOICE

## 2018-12-10 RX ORDER — CLOTRIMAZOLE 1 %
CREAM (GRAM) TOPICAL
Qty: 28 G | Refills: 0 | Status: SHIPPED | OUTPATIENT
Start: 2018-12-10 | End: 2019-01-08

## 2018-12-10 RX ORDER — DIPHENHYDRAMINE HCL 25 MG
25 CAPSULE ORAL EVERY 6 HOURS PRN
Qty: 20 CAPSULE | Refills: 0 | Status: SHIPPED | OUTPATIENT
Start: 2018-12-10 | End: 2019-01-14 | Stop reason: SDUPTHER

## 2018-12-11 NOTE — DISCHARGE INSTRUCTIONS
General Allergic Reaction   AMBULATORY CARE:   A general allergic reaction  is your body's response to an allergen  Allergens include medicines, food, insect stings, animal dander, mold, latex, chemicals, and dust mites  Pollen from trees, grass, and weeds can also cause an allergic reaction  Seek care immediately if:   · You have a skin rash, hives, swelling, or itching that gets worse  · You have trouble breathing, shortness of breath, wheezing, or coughing  · Your throat tightens, or your lips or tongue swell  · You have trouble swallowing or speaking  · You have dizziness, lightheadedness, fainting, or confusion  · You have nausea, vomiting, diarrhea, or abdominal cramps  · You have chest pain or tightness  Contact your healthcare provider if:   · You have questions or concerns about your condition or care  Treatment for a general allergic reaction  may include medicines to relieve certain allergy symptoms such as itching, sneezing, and swelling  You may take them as a pill or use drops in your nose or eyes  Topical treatments may be given to put directly on your skin to help decrease itching or swelling  Manage allergic reactions:   · Avoid the allergen  that you think may have caused your allergic reaction  · Use cold compresses  on your skin or eyes if they were affected by the allergic reaction  Cold compresses may help to soothe your skin or eyes  · Rinse your nasal passages  with a saline solution  Daily rinsing may help clear your nose of allergens  · Do not smoke  Your allergy symptoms may decrease if you are not around smoke  Nicotine and other chemicals in cigarettes and cigars can also cause lung damage  Ask your healthcare provider for information if you currently smoke and need help to quit  E-cigarettes or smokeless tobacco still contain nicotine  Talk to your healthcare provider before you use these products    Follow up with your healthcare provider as directed:  Write down your questions so you remember to ask them during your visits  © 2017 [employer] Information is for End User's use only and may not be sold, redistributed or otherwise used for commercial purposes  All illustrations and images included in CareNotes® are the copyrighted property of [employer]  or [employer]  The above information is an  only  It is not intended as medical advice for individual conditions or treatments  Talk to your doctor, nurse or pharmacist before following any medical regimen to see if it is safe and effective for you  Jock Itch   WHAT YOU NEED TO KNOW:   Jock itch is a rash on your groin  The groin is the area between your abdomen and legs  Jock itch is usually easy to treat and prevent  It is caused by a fungus  The fungus also causes athlete's foot  DISCHARGE INSTRUCTIONS:   Medicines:   · Fungus medicine:  Jock itch is usually treated with a cream that kills the fungus  Apply the cream to the rash and the skin around it as directed  You may need to apply the cream 1 to 2 times each day for 2 weeks  You may be given this medicine as a pill if the cream does not help  · Take your medicine as directed  Contact your healthcare provider if you think your medicine is not helping or if you have side effects  Tell him or her if you are allergic to any medicine  Keep a list of the medicines, vitamins, and herbs you take  Include the amounts, and when and why you take them  Bring the list or the pill bottles to follow-up visits  Carry your medicine list with you in case of an emergency  Manage and prevent jock itch:   · Keep the area dry  · Wear light, loose clothes  Do not share clothes  · Do not wear wet clothes for long periods  Wash athletic gear after you play sports  · Bathe daily  Dry your skin completely after you bathe  Apply cream or powder after you bathe as directed if you get jock itch often   Wash your hands often to prevent the spread of the fungus  You may want to wear disposable gloves when you clean your feet  The gloves will keep the fungus from moving from your feet to your hands  · Use separate towels to dry each part of your body  Put your socks on before you put on your underwear so you do not spread the fungus from your feet to your groin  · Lose weight if you weigh more than your healthcare provider suggests  Follow up with your healthcare provider or skin specialist as directed: Your healthcare provider will examine your rash to see how well it is healing  If you take medicine as a pill, he may draw blood to check how your liver and kidneys are working  Write down your questions so you remember to ask them during your visits  Contact your healthcare provider or skin specialist if:   · Your signs and symptoms do not get better within 2 weeks of treatment  · Your signs and symptoms get worse or come back after treatment  · You get a rash on a part of your body other than your groin  · You have a fever  · You have questions or concerns about your condition or care  © 2017 [address] [facility] Information is for End User's use only and may not be sold, redistributed or otherwise used for commercial purposes  All illustrations and images included in CareNotes® are the copyrighted property of [employer]  or [employer]  The above information is an  only  It is not intended as medical advice for individual conditions or treatments  Talk to your doctor, nurse or pharmacist before following any medical regimen to see if it is safe and effective for you

## 2018-12-11 NOTE — ED PROVIDER NOTES
History  Chief Complaint   Patient presents with    Rash     Pt states he used a public restroom today around 1600,  afterwards noticed rash on hands and groin  71-year-old male with past medical history of fungal infection, asthma, anxiety, renal stones, who presents to the emergency department for itching and rash in the bilateral thighs that started today  Patient states that he stop by a truck stop to use the public restroom, and noticed that he had a new itchy, burning, erythematous rash to his bilateral upper thighs  Does not affect his penis or testicles  States that they looks like hives initially, but following use of hydrocortisone cream, Chem on lotion and Benadryl, the rash improved and is now dry and scaly  He presents to the emergency department as he is concerned that he might be having reaction to the cleaning agent or a bacterial infection from the bathroom  States that this also feels like previous episodes of fungal rash that he has visited the emergency department for on multiple occasions  Denies fevers, chills, urinary symptoms, nausea, vomiting  History provided by:  Patient and medical records   used: No    Rash   Associated symptoms: no abdominal pain, no diarrhea, no fever, no headaches, no joint pain, no myalgias, no nausea, no shortness of breath, no sore throat, not vomiting and not wheezing        Prior to Admission Medications   Prescriptions Last Dose Informant Patient Reported? Taking? [facility] ALL[facility] 25 MG capsule   No No   Sig: Take 1 capsule (25 mg total) by mouth daily at bedtime   EPINEPHrine (EPIPEN 2-ARUN) 0 3 mg/0 3 mL SOAJ   Yes No   Sig: Inject as directed   Menthol, Topical Analgesic, (Flexall 454) 16 % GEL   Yes No   Sig: Apply topically     Mometasone Furoate (ASMANEX HFA) 100 MCG/ACT AERO   Yes No   Sig: Inhale 2 puffs 2 (two) times a day   albuterol (PROAIR HFA) 90 mcg/act inhaler   No No   Sig: Inhale 2 puffs every 6 (six) hours as needed for wheezing   clonazePAM (KlonoPIN) 0 5 mg tablet   Yes No   Sig: Take by mouth 4 (four) times a day as needed   cyclobenzaprine (FLEXERIL) 10 mg tablet   Yes No   Sig: Take 10 mg by mouth 3 (three) times a day as needed for muscle spasms   fluticasone (FLONASE) 50 mcg/act nasal spray   No No   Sig: SPRAY 2 SPRAYS INTO EACH NOSTRIL EVERY DAY   ibuprofen (MOTRIN) 600 mg tablet   Yes No   Sig: Take by mouth   lidocaine (LIDODERM) 5 %   Yes No   Sig: Place 1 patch on the skin every 24 hours Remove & Discard patch within 12 hours or as directed by MD   zolpidem (AMBIEN) 10 mg tablet   Yes No      Facility-Administered Medications: None       Past Medical History:   Diagnosis Date    Allergic rhinitis 01/13/2011    Anxiety     Asthma     Insomnia     Kidney stone     Myalgia and Myositis 11/13/2007    Pain     chronic neck,back, spine    Psychiatric disorder     Temporomandibular joint disorder 01/06/2009       Past Surgical History:   Procedure Laterality Date    KIDNEY STONE SURGERY      pt poor historian       Family History   Problem Relation Age of Onset    No Known Problems Mother      I have reviewed and agree with the history as documented  Social History   Substance Use Topics    Smoking status: Never Smoker    Smokeless tobacco: Never Used    Alcohol use No        Review of Systems   Constitutional: Negative for chills and fever  HENT: Negative for congestion, ear pain, postnasal drip, rhinorrhea, sinus pain, sinus pressure, sore throat and trouble swallowing  Respiratory: Negative for cough, chest tightness, shortness of breath and wheezing  Cardiovascular: Negative for chest pain, palpitations and leg swelling  Gastrointestinal: Negative for abdominal pain, anal bleeding, constipation, diarrhea, nausea and vomiting  Genitourinary: Negative for dysuria, flank pain, frequency, hematuria, penile pain, penile swelling, scrotal swelling, testicular pain and urgency  Musculoskeletal: Negative for arthralgias, back pain, gait problem, joint swelling, myalgias, neck pain and neck stiffness  Skin: Positive for rash  Negative for color change, pallor and wound  Neurological: Negative for dizziness, syncope, weakness, light-headedness, numbness and headaches  Physical Exam  Physical Exam   Constitutional: He is oriented to person, place, and time  He appears well-developed and well-nourished  No distress  HENT:   Head: Normocephalic and atraumatic  Eyes: Pupils are equal, round, and reactive to light  Conjunctivae and EOM are normal    Neck: Normal range of motion  Neck supple  Cardiovascular: Normal rate, regular rhythm and intact distal pulses  Pulmonary/Chest: Effort normal  No respiratory distress  Abdominal: Soft  There is no tenderness  Musculoskeletal: Normal range of motion  He exhibits no edema or tenderness  Neurological: He is alert and oriented to person, place, and time  Skin: Skin is warm and dry  Capillary refill takes less than 2 seconds  Rash (Dry erythematous scaly rash to the bilateral inguinal region  Does not involve the groin or perineal region ) noted  He is not diaphoretic  Psychiatric: He has a normal mood and affect  His behavior is normal    Nursing note and vitals reviewed        Vital Signs  ED Triage Vitals [12/10/18 1916]   Temperature Pulse Respirations Blood Pressure SpO2   98 °F (36 7 °C) (!) 108 18 161/92 98 %      Temp Source Heart Rate Source Patient Position - Orthostatic VS BP Location FiO2 (%)   Oral Monitor Sitting Right arm --      Pain Score       No Pain           Vitals:    12/10/18 1916   BP: 161/92   Pulse: (!) 108   Patient Position - Orthostatic VS: Sitting       Visual Acuity      ED Medications  Medications - No data to display    Diagnostic Studies  Results Reviewed     None                 No orders to display              Procedures  Procedures       Phone Contacts  ED Phone Contact    ED Course MDM  Number of Diagnoses or Management Options  Allergic disorder, initial encounter:   Tinea cruris:   Diagnosis management comments: Differential Diagnosis includes but is not limited to:   Fungal infection, allergic reaction, cellulitis  Patient likely has a fungal infection  Given clotrimazole cream as he states that has has helped previously  Will also treat patient for possible allergic reaction as he states that they were hives involved previously  Given prescription for prednisone taper and Benadryl  Instructed to follow up with primary care doctor for re-evaluation  No signs of anaphylaxis in the emergency department  CritCare Time    Disposition  Final diagnoses:   Tinea cruris   Allergic disorder, initial encounter     Time reflects when diagnosis was documented in both MDM as applicable and the Disposition within this note     Time User Action Codes Description Comment    12/10/2018  7:31 PM Anastacio Hoyos Add [B35 6] Tinea cruris     12/10/2018  7:34 PM Anali Amin Add [T78 40XA] Allergic disorder, initial encounter       ED Disposition     ED Disposition Condition Comment    Discharge  Carole Schaeffer discharge to home/self care  Condition at discharge: Good        Follow-up Information     Follow up With Specialties Details Why Contact Info    Selvin Lee MD Family Medicine In 1 week As needed, If symptoms worsen [address]  [address]  [phone]            Discharge Medication List as of 12/10/2018  7:36 PM      START taking these medications    Details   clotrimazole (LOTRIMIN) 1 % cream Apply to affected area 2 times daily, Print      !! diphenhydrAMINE (BENADRYL) 25 mg capsule Take 1 capsule (25 mg total) by mouth every 6 (six) hours as needed for itching, Starting Mon 12/10/2018, Print      predniSONE 5 mg tablet Day 1: Take 5 tablets  Day 2: Take 4 tablets  Day 3: Take 3 tablets  Day 2: Take 2 Tablets  Day 1: Take 1 tablet  , Print       ! ! - Potential duplicate medications found  Please discuss with provider  CONTINUE these medications which have NOT CHANGED    Details   albuterol (PROAIR HFA) 90 mcg/act inhaler Inhale 2 puffs every 6 (six) hours as needed for wheezing, Starting Mon 2/26/2018, Normal      clonazePAM (KlonoPIN) 0 5 mg tablet Take by mouth 4 (four) times a day as needed, Starting Mon 12/19/2011, Historical Med      !! [facility] ALL[facility]GY 25 MG capsule Take 1 capsule (25 mg total) by mouth daily at bedtime, Starting Mon 2/26/2018, Normal      cyclobenzaprine (FLEXERIL) 10 mg tablet Take 10 mg by mouth 3 (three) times a day as needed for muscle spasms, Historical Med      EPINEPHrine (EPIPEN 2-ARUN) 0 3 mg/0 3 mL SOAJ Inject as directed, Starting Thu 5/11/2017, Historical Med      fluticasone (FLONASE) 50 mcg/act nasal spray SPRAY 2 SPRAYS INTO EACH NOSTRIL EVERY DAY, Normal      ibuprofen (MOTRIN) 600 mg tablet Take by mouth, Starting Mon 10/23/2017, Historical Med      lidocaine (LIDODERM) 5 % Place 1 patch on the skin every 24 hours Remove & Discard patch within 12 hours or as directed by MD, Until Discontinued, Historical Med      Menthol, Topical Analgesic, (Flexall 454) 16 % GEL Apply topically  , Until Discontinued, Historical Med      Mometasone Furoate [facility] DISTRICT HFA) 100 MCG/ACT AERO Inhale 2 puffs 2 (two) times a day, Starting Mon 10/23/2017, Historical Med      zolpidem (AMBIEN) 10 mg tablet Starting Sun 2/25/2018, Historical Med       !! - Potential duplicate medications found  Please discuss with provider  No discharge procedures on file      ED Provider  Electronically Signed by           Brianna Peirson PA-C  12/10/18 1689

## 2019-01-08 ENCOUNTER — HOSPITAL ENCOUNTER (EMERGENCY)
Facility: HOSPITAL | Age: 64
Discharge: HOME | End: 2019-01-08
Attending: EMERGENCY MEDICINE
Payer: MEDICARE

## 2019-01-08 VITALS
TEMPERATURE: 98.7 F | BODY MASS INDEX: 28.32 KG/M2 | WEIGHT: 189 LBS | OXYGEN SATURATION: 98 % | SYSTOLIC BLOOD PRESSURE: 172 MMHG | DIASTOLIC BLOOD PRESSURE: 86 MMHG | HEART RATE: 109 BPM | RESPIRATION RATE: 19 BRPM

## 2019-01-08 DIAGNOSIS — R21 RASH AND NONSPECIFIC SKIN ERUPTION: ICD-10-CM

## 2019-01-08 DIAGNOSIS — B35.6 TINEA CRURIS: ICD-10-CM

## 2019-01-08 DIAGNOSIS — L30.9 DERMATITIS: Primary | ICD-10-CM

## 2019-01-08 PROCEDURE — 99282 EMERGENCY DEPT VISIT SF MDM: CPT

## 2019-01-08 RX ORDER — FAMOTIDINE 20 MG/1
20 TABLET, FILM COATED ORAL ONCE
Status: COMPLETED | OUTPATIENT
Start: 2019-01-08 | End: 2019-01-08

## 2019-01-08 RX ORDER — CLOTRIMAZOLE 1 %
CREAM (GRAM) TOPICAL
Qty: 28 G | Refills: 0 | Status: SHIPPED | OUTPATIENT
Start: 2019-01-08

## 2019-01-08 RX ORDER — PREDNISONE 20 MG/1
60 TABLET ORAL ONCE
Status: COMPLETED | OUTPATIENT
Start: 2019-01-08 | End: 2019-01-08

## 2019-01-08 RX ORDER — PREDNISONE 20 MG/1
20 TABLET ORAL 3 TIMES DAILY
Qty: 12 TABLET | Refills: 0 | Status: SHIPPED | OUTPATIENT
Start: 2019-01-08 | End: 2019-01-12

## 2019-01-08 RX ADMIN — PREDNISONE 60 MG: 20 TABLET ORAL at 16:06

## 2019-01-08 RX ADMIN — FAMOTIDINE 20 MG: 20 TABLET ORAL at 16:06

## 2019-01-08 NOTE — DISCHARGE INSTRUCTIONS
Dermatitis   WHAT YOU NEED TO KNOW:   Dermatitis is skin inflammation  You may have an itchy rash, redness, or swelling  You may also have bumps or blisters that crust over or ooze clear fluid  Dermatitis can be caused by allergens such as dust mites, pet dander, pollen, and certain foods  It can also develop when something touches your skin and irritates it or causes an allergic reaction  Examples include soaps, chemicals, latex, and poison ivy  DISCHARGE INSTRUCTIONS:   Call 911 if you have any of the following symptoms of anaphylaxis:   · Sudden trouble breathing    · Throat swelling and tightness    · Dizziness, lightheadedness, fainting, or confusion  Return to the emergency department if:   · You develop a fever or have red streaks going up your arm or leg  · Your rash gets more swollen, red, or hot  Contact your healthcare provider if:   · Your skin blisters, oozes white or yellow pus, or has a foul-smelling discharge  · Your rash spreads or does not get better, even after treatment  · You have questions or concerns about your condition or care  Medicines:   · Medicines  help decrease itching and inflammation, or treat a bacterial infection  They may be given as a topical cream, shot, or a pill  · Take your medicine as directed  Contact your healthcare provider if you think your medicine is not helping or if you have side effects  Tell him of her if you are allergic to any medicine  Keep a list of the medicines, vitamins, and herbs you take  Include the amounts, and when and why you take them  Bring the list or the pill bottles to follow-up visits  Carry your medicine list with you in case of an emergency  Apply a cool compress to your rash: This will help soothe your skin  Keep your skin moist:  Rub unscented cream or lotion on your skin to prevent dryness and itching  Do this right after a lukewarm bath or shower when your skin is still damp    Avoid skin irritants:  Do not use skin irritants, such as makeup, hair products, soaps, and cleansers  Use products that do not contain fragrances or dye  Follow up with your healthcare provider as directed:  Write down your questions so you remember to ask them during your visits  © 2017 [employer] [employer] Information is for End User's use only and may not be sold, redistributed or otherwise used for commercial purposes  All illustrations and images included in CareNotes® are the copyrighted property of [employer]  or [employer]  The above information is an  only  It is not intended as medical advice for individual conditions or treatments  Talk to your doctor, nurse or pharmacist before following any medical regimen to see if it is safe and effective for you

## 2019-01-08 NOTE — ED NOTES
Discharge instruction given to patient  No questions or concerns at this time  Patient able to ambulate out without difficulty        Angel Mcknight RN  01/08/19 0206

## 2019-01-08 NOTE — ED PROVIDER NOTES
History  Chief Complaint   Patient presents with    Rash     c/o red rash located on bilateral hands, and bilateral inner thighs, and eye watering/itching s/p after taking outdoor xmas lights down x 1 hr ago  Pt took Benadryl approx 40 minutes PTA  Pt stated "the rash looks better but I'm affraid it'll spend to my private parts"  58 yo M presenting with rash occurring just prior to arrival  Pt reports he has very sensitive skin and frequent rashes secondary to allergies  Pt reports today he was taking down the shashi lights off of his trees and bushes in the front yard just before noticing the rash  Pt reports 'i think they came by a week or so ago and sprayed them with insecticide or something'  Pt reports coming inside and changing his clothes as his jeans were wet from the trees  He reports then noticing an itchy rash over dorsum b/l hands and anterior b/l thighs  He reports taking benadryl and then coming to the ED  He denies any SOB, cough, wheezing or stridor  Prior to Admission Medications   Prescriptions Last Dose Informant Patient Reported? Taking? [facility] ALLERGY 25 MG capsule   No No   Sig: Take 1 capsule (25 mg total) by mouth daily at bedtime   EPINEPHrine (EPIPEN 2-ARUN) 0 3 mg/0 3 mL SOAJ   Yes No   Sig: Inject as directed   Menthol, Topical Analgesic, (Flexall 454) 16 % GEL   Yes No   Sig: Apply topically     Mometasone Furoate (ASMANEX HFA) 100 MCG/ACT AERO   Yes No   Sig: Inhale 2 puffs 2 (two) times a day   albuterol (PROAIR HFA) 90 mcg/act inhaler   No No   Sig: Inhale 2 puffs every 6 (six) hours as needed for wheezing   clonazePAM (KlonoPIN) 0 5 mg tablet   Yes No   Sig: Take by mouth 4 (four) times a day as needed   clotrimazole (LOTRIMIN) 1 % cream   No No   Sig: Apply to affected area 2 times daily   cyclobenzaprine (FLEXERIL) 10 mg tablet   Yes No   Sig: Take 10 mg by mouth 3 (three) times a day as needed for muscle spasms   diphenhydrAMINE (BENADRYL) 25 mg capsule   No No Sig: Take 1 capsule (25 mg total) by mouth every 6 (six) hours as needed for itching   fluticasone (FLONASE) 50 mcg/act nasal spray   No No   Sig: SPRAY 2 SPRAYS INTO EACH NOSTRIL EVERY DAY   ibuprofen (MOTRIN) 600 mg tablet   Yes No   Sig: Take by mouth   lidocaine (LIDODERM) 5 %   Yes No   Sig: Place 1 patch on the skin every 24 hours Remove & Discard patch within 12 hours or as directed by MD   predniSONE 5 mg tablet   No No   Sig: Day 1: Take 5 tablets  Day 2: Take 4 tablets  Day 3: Take 3 tablets  Day 2: Take 2 Tablets  Day 1: Take 1 tablet  zolpidem (AMBIEN) 10 mg tablet   Yes No      Facility-Administered Medications: None       Past Medical History:   Diagnosis Date    Allergic rhinitis 01/13/2011    Anxiety     Asthma     Insomnia     Kidney stone     Myalgia and Myositis 11/13/2007    Pain     chronic neck,back, spine    Psychiatric disorder     Temporomandibular joint disorder 01/06/2009       Past Surgical History:   Procedure Laterality Date    KIDNEY STONE SURGERY      pt poor historian       Family History   Problem Relation Age of Onset    No Known Problems Mother      I have reviewed and agree with the history as documented  Social History   Substance Use Topics    Smoking status: Never Smoker    Smokeless tobacco: Never Used    Alcohol use No        Review of Systems   All other systems reviewed and are negative  Physical Exam  Physical Exam   Constitutional: He is oriented to person, place, and time  He appears well-developed and well-nourished  No distress  HENT:   Head: Normocephalic and atraumatic  Eyes: Conjunctivae are normal    EOM grossly intact   Neck: Normal range of motion  Neck supple  No JVD present  Cardiovascular: Normal rate  Pulmonary/Chest: Effort normal    Abdominal: Soft  Musculoskeletal:   FROM, steady gait, cap refill brisk, strength and sensation grossly intact throughout   Neurological: He is alert and oriented to person, place, and time  Skin: Skin is warm and dry  Capillary refill takes less than 2 seconds  Psychiatric: He has a normal mood and affect  His behavior is normal    Nursing note and vitals reviewed  Vital Signs  ED Triage Vitals [01/08/19 1525]   Temperature Pulse Respirations Blood Pressure SpO2   98 7 °F (37 1 °C) (!) 109 19 (!) 172/86 98 %      Temp Source Heart Rate Source Patient Position - Orthostatic VS BP Location FiO2 (%)   Oral Monitor Sitting Left arm --      Pain Score       3           Vitals:    01/08/19 1525   BP: (!) 172/86   Pulse: (!) 109   Patient Position - Orthostatic VS: Sitting       Visual Acuity      ED Medications  Medications   famotidine (PEPCID) tablet 20 mg (20 mg Oral Given 1/8/19 1606)   predniSONE tablet 60 mg (60 mg Oral Given 1/8/19 1606)       Diagnostic Studies  Results Reviewed     None                 No orders to display              Procedures  Procedures       Phone Contacts  ED Phone Contact    ED Course                               MDM  Number of Diagnoses or Management Options  Dermatitis:   Rash and nonspecific skin eruption:   Diagnosis management comments: 58 yo M presenting with rash starting 1 hour prior to arrival  hpi ros and pe all consistent with dermatitis, ?allergic reaction to insecticides that were sprayed onto trees that he was working on, pt had relief with benadryl prior to arrival but will give pepcid and prednisone, send home with short course of steroids, pt also requesting rx for clotrimin cream, otherwise he appears well, non toxic appearing, no acute distress, f/u with pcp as needed outpatient    strict return to ED precautions discussed  Pt verbalizes understanding and agrees with plan  Pt is stable for discharge    Portions of the record may have been created with voice recognition software  Occasional wrong word or "sound a like" substitutions may have occurred due to the inherent limitations of voice recognition software   Read the chart carefully and recognize, using context, where substitutions have occurred  CritCare Time    Disposition  Final diagnoses:   Dermatitis   Rash and nonspecific skin eruption     Time reflects when diagnosis was documented in both MDM as applicable and the Disposition within this note     Time User Action Codes Description Comment    1/8/2019  3:55 PM Preethi Ek Add [L30 9] Dermatitis     1/8/2019  3:57 PM Preethi Ek Add [B35 6] Tinea cruris     1/8/2019  3:58 PM Preethi Ek Modify [B35 6] Tinea cruris     1/8/2019  3:58 PM Preethi Ek Modify [B35 6] Tinea cruris     1/8/2019  3:58 PM Preethi Ek Add [R21] Rash and nonspecific skin eruption       ED Disposition     ED Disposition Condition Comment    Discharge  Theodore Murillo discharge to home/self care  Condition at discharge: Good        Follow-up Information     Follow up With Specialties Details Why Contact Gus Patton MD Family Medicine Schedule an appointment as soon as possible for a visit As needed [address] [address]  [place] [facility]  [phone]            Discharge Medication List as of 1/8/2019  3:58 PM      START taking these medications    Details   ! ! predniSONE 20 mg tablet Take 1 tablet (20 mg total) by mouth 3 (three) times a day for 4 days, Starting Tue 1/8/2019, Until Sat 1/12/2019, Print       !! - Potential duplicate medications found  Please discuss with provider        CONTINUE these medications which have CHANGED    Details   clotrimazole (LOTRIMIN) 1 % cream Apply to affected area 2 times daily, Print         CONTINUE these medications which have NOT CHANGED    Details   albuterol (PROAIR HFA) 90 mcg/act inhaler Inhale 2 puffs every 6 (six) hours as needed for wheezing, Starting Mon 2/26/2018, Normal      clonazePAM (KlonoPIN) 0 5 mg tablet Take by mouth 4 (four) times a day as needed, Starting Mon 12/19/2011, Historical Med      !! [facility] ALLERGY 25 MG capsule Take 1 capsule (25 mg total) by mouth daily at bedtime, Starting Mon 2/26/2018, Normal      cyclobenzaprine (FLEXERIL) 10 mg tablet Take 10 mg by mouth 3 (three) times a day as needed for muscle spasms, Historical Med      !! diphenhydrAMINE (BENADRYL) 25 mg capsule Take 1 capsule (25 mg total) by mouth every 6 (six) hours as needed for itching, Starting Mon 12/10/2018, Print      EPINEPHrine (EPIPEN 2-ARUN) 0 3 mg/0 3 mL SOAJ Inject as directed, Starting Thu 5/11/2017, Historical Med      fluticasone (FLONASE) 50 mcg/act nasal spray SPRAY 2 SPRAYS INTO EACH NOSTRIL EVERY DAY, Normal      ibuprofen (MOTRIN) 600 mg tablet Take by mouth, Starting Mon 10/23/2017, Historical Med      lidocaine (LIDODERM) 5 % Place 1 patch on the skin every 24 hours Remove & Discard patch within 12 hours or as directed by MD, Until Discontinued, Historical Med      Menthol, Topical Analgesic, (Flexall 454) 16 % GEL Apply topically  , Until Discontinued, Historical Med      Mometasone Furoate [facility] DISTRICT HFA) 100 MCG/ACT AERO Inhale 2 puffs 2 (two) times a day, Starting Mon 10/23/2017, Historical Med      !! predniSONE 5 mg tablet Day 1: Take 5 tablets  Day 2: Take 4 tablets  Day 3: Take 3 tablets  Day 2: Take 2 Tablets  Day 1: Take 1 tablet  , Print      zolpidem (AMBIEN) 10 mg tablet Starting Sun 2/25/2018, Historical Med       !! - Potential duplicate medications found  Please discuss with provider  No discharge procedures on file      ED Provider  Electronically Signed by           Danny Hendrix PA-C  01/08/19 8208

## 2019-01-14 ENCOUNTER — OFFICE VISIT (OUTPATIENT)
Dept: FAMILY MEDICINE CLINIC | Facility: CLINIC | Age: 64
End: 2019-01-14
Payer: MEDICARE

## 2019-01-14 VITALS
BODY MASS INDEX: 27.99 KG/M2 | RESPIRATION RATE: 16 BRPM | WEIGHT: 189 LBS | OXYGEN SATURATION: 98 % | SYSTOLIC BLOOD PRESSURE: 140 MMHG | HEIGHT: 69 IN | DIASTOLIC BLOOD PRESSURE: 80 MMHG | HEART RATE: 104 BPM

## 2019-01-14 DIAGNOSIS — J45.20 MILD INTERMITTENT ASTHMA WITHOUT COMPLICATION: ICD-10-CM

## 2019-01-14 DIAGNOSIS — L23.7 ALLERGIC CONTACT DERMATITIS DUE TO PLANTS, EXCEPT FOOD: Primary | ICD-10-CM

## 2019-01-14 DIAGNOSIS — Z13.6 SCREENING FOR CARDIOVASCULAR CONDITION: ICD-10-CM

## 2019-01-14 DIAGNOSIS — F41.9 ANXIETY: ICD-10-CM

## 2019-01-14 DIAGNOSIS — J30.9 ALLERGIC RHINITIS, UNSPECIFIED SEASONALITY, UNSPECIFIED TRIGGER: ICD-10-CM

## 2019-01-14 DIAGNOSIS — T78.40XS ALLERGIC DISORDER, SEQUELA: ICD-10-CM

## 2019-01-14 DIAGNOSIS — B35.6 TINEA CRURIS: ICD-10-CM

## 2019-01-14 PROBLEM — J02.9 PHARYNGITIS: Status: RESOLVED | Noted: 2018-02-26 | Resolved: 2019-01-14

## 2019-01-14 PROCEDURE — 99214 OFFICE O/P EST MOD 30 MIN: CPT | Performed by: FAMILY MEDICINE

## 2019-01-14 RX ORDER — TRIAMCINOLONE ACETONIDE 5 MG/G
OINTMENT TOPICAL 2 TIMES DAILY
Qty: 30 G | Refills: 1 | Status: SHIPPED | OUTPATIENT
Start: 2019-01-14 | End: 2019-02-05 | Stop reason: SDUPTHER

## 2019-01-14 RX ORDER — EPINEPHRINE 0.3 MG/.3ML
INJECTION SUBCUTANEOUS
Qty: 1 EACH | Refills: 1 | Status: SHIPPED | OUTPATIENT
Start: 2019-01-14

## 2019-01-14 RX ORDER — ALBUTEROL SULFATE 90 UG/1
2 AEROSOL, METERED RESPIRATORY (INHALATION) EVERY 6 HOURS PRN
Qty: 1 INHALER | Refills: 3 | Status: SHIPPED | OUTPATIENT
Start: 2019-01-14 | End: 2020-05-10

## 2019-01-14 RX ORDER — DIPHENHYDRAMINE HCL 25 MG
25 CAPSULE ORAL EVERY 6 HOURS PRN
Qty: 30 CAPSULE | Refills: 1 | Status: SHIPPED | OUTPATIENT
Start: 2019-01-14 | End: 2020-02-08

## 2019-01-14 RX ORDER — FLUTICASONE PROPIONATE 50 MCG
SPRAY, SUSPENSION (ML) NASAL
Qty: 1 BOTTLE | Refills: 5 | Status: SHIPPED | OUTPATIENT
Start: 2019-01-14 | End: 2019-07-04 | Stop reason: SDUPTHER

## 2019-01-14 NOTE — ASSESSMENT & PLAN NOTE
He finished prednisone tapering dose, now he has only dry and scaly skin on the upper part of the both thighs    No rash on skin of the hands  He will use Kenalog ointment for 1 week, refill given if he gets a repeat allergic reaction

## 2019-01-14 NOTE — PROGRESS NOTES
Assessment/Plan:    Problem List Items Addressed This Visit        Respiratory    Asthma, mild intermittent     Asthma is stable is mild intermittent and ProAir is given         Relevant Medications    albuterol (PROAIR HFA) 90 mcg/act inhaler    Allergic rhinitis     Stable he will continue Flonase and refill given            Musculoskeletal and Integument    Tinea cruris     He was given clotrimazole by the ER physician he can continue that in groin           Relevant Medications    triamcinolone (KENALOG) 0 5 % ointment    diphenhydrAMINE (BENADRYL) 25 mg capsule    Allergic contact dermatitis due to plants, except food - Primary     He finished prednisone tapering dose, now he has only dry and scaly skin on the upper part of the both thighs  No rash on skin of the hands  He will use Kenalog ointment for 1 week, refill given if he gets a repeat allergic reaction         Relevant Medications    triamcinolone (KENALOG) 0 5 % ointment       Other    Allergic    Relevant Medications    fluticasone (FLONASE) 50 mcg/act nasal spray    EPINEPHrine (EPIPEN 2-ARUN) 0 3 mg/0 3 mL SOAJ    Screening for cardiovascular condition     Advised to have labs in few weeks and then follow up         Relevant Orders    CBC and differential    Comprehensive metabolic panel    Lipid panel    TSH, 3rd generation    Anxiety     He is tachycardiac due to anxiety, advised to take his Klonopin which is given by neurologist               Chief Complaint   Patient presents with    Rash     follow up from er       Subjective:   Patient ID: Gretchen Jamil is a 59 y o  male      He was seen in the ER 1 week ago and he says he started having a rash, after he was taking off his Ana lights from the bushes, he says he immediately started having itching and rash in both hands and in the upper thighs, he went to the ER and he was given tapering dose of prednisone and clotrimazole,  He says 50% improvement but he still feel a burning sensation in his upper thighs and dry skin, he has no blisters no redness hand he denies any fever chills shortness of breath chest pain  He says he is nervous and that is why his heart rate is up, he goes to neurologist and he takes Klonopin from there  He has no labs in long time  He has mild intermittent asthma and he needs a refill on his ProAir, he does not use any maintenance inhalers  He also needs a refill on Benadryl and EpiPen to keep updated  He use Flonase every day for his seasonal allergies        Review of Systems   Constitutional: Negative for activity change, appetite change, chills, diaphoresis, fatigue, fever and unexpected weight change  HENT: Negative for congestion, dental problem, ear discharge, ear pain, facial swelling, hearing loss, mouth sores, nosebleeds, postnasal drip, rhinorrhea, sinus pain, sinus pressure, sneezing, sore throat, trouble swallowing and voice change  Eyes: Negative for photophobia, pain, discharge, redness and itching  Respiratory: Negative for cough, chest tightness, shortness of breath and wheezing  Cardiovascular: Negative for chest pain, palpitations and leg swelling  Gastrointestinal: Negative for abdominal distention, abdominal pain, blood in stool, constipation, diarrhea and nausea  Endocrine: Negative for cold intolerance, heat intolerance, polydipsia, polyphagia and polyuria  Genitourinary: Negative for dysuria, flank pain, frequency, hematuria and urgency  Musculoskeletal: Negative for arthralgias, back pain, myalgias and neck pain  Skin: Negative for color change and pallor  Allergic/Immunologic: Negative for environmental allergies and food allergies  Neurological: Negative for dizziness, weakness, light-headedness, numbness and headaches  Hematological: Negative for adenopathy  Does not bruise/bleed easily  Psychiatric/Behavioral: Negative for behavioral problems, sleep disturbance and suicidal ideas  The patient is not nervous/anxious  Objective:  Physical Exam   Constitutional: He is oriented to person, place, and time  He appears well-developed and well-nourished  HENT:   Head: Normocephalic and atraumatic  Mouth/Throat: No oropharyngeal exudate  Eyes: Pupils are equal, round, and reactive to light  Conjunctivae and EOM are normal  Right eye exhibits no discharge  Left eye exhibits no discharge  No scleral icterus  Neck: Normal range of motion  Neck supple  No tracheal deviation present  No thyromegaly present  Cardiovascular: Normal rate, regular rhythm and normal heart sounds  No murmur heard  Pulmonary/Chest: Effort normal and breath sounds normal  No respiratory distress  He has no wheezes  He has no rales  Abdominal: Soft  Bowel sounds are normal  He exhibits no distension and no mass  There is no tenderness  There is no rebound  Musculoskeletal: Normal range of motion  He exhibits no edema  Lymphadenopathy:     He has no cervical adenopathy  Neurological: He is alert and oriented to person, place, and time  He has normal reflexes  No cranial nerve deficit  Skin: Skin is warm  No rash noted  No erythema  No pallor  Psychiatric: He has a normal mood and affect  His behavior is normal  Judgment and thought content normal    Nursing note and vitals reviewed           Past Surgical History:   Procedure Laterality Date    KIDNEY STONE SURGERY      pt poor historian       Family History   Problem Relation Age of Onset    No Known Problems Mother          Current Outpatient Prescriptions:     albuterol (PROAIR HFA) 90 mcg/act inhaler, Inhale 2 puffs every 6 (six) hours as needed for wheezing, Disp: 1 Inhaler, Rfl: 3    clonazePAM (KlonoPIN) 0 5 mg tablet, Take by mouth 4 (four) times a day as needed, Disp: , Rfl:     clotrimazole (LOTRIMIN) 1 % cream, Apply to affected area 2 times daily, Disp: 28 g, Rfl: 0    [employer] ALLERGY 25 MG capsule, Take 1 capsule (25 mg total) by mouth daily at bedtime, Disp: 30 capsule, Rfl: 1    cyclobenzaprine (FLEXERIL) 10 mg tablet, Take 10 mg by mouth 3 (three) times a day as needed for muscle spasms, Disp: , Rfl:     diphenhydrAMINE (BENADRYL) 25 mg capsule, Take 1 capsule (25 mg total) by mouth every 6 (six) hours as needed for itching, Disp: 30 capsule, Rfl: 1    EPINEPHrine (EPIPEN 2-ARUN) 0 3 mg/0 3 mL SOAJ, Use in case of anaphylaxis reaction, Disp: 1 each, Rfl: 1    fluticasone (FLONASE) 50 mcg/act nasal spray, Used 2 spray each nostril once a day, Disp: 1 Bottle, Rfl: 5    ibuprofen (MOTRIN) 600 mg tablet, Take by mouth, Disp: , Rfl:     lidocaine (LIDODERM) 5 %, Place 1 patch on the skin every 24 hours Remove & Discard patch within 12 hours or as directed by MD, Disp: , Rfl:     Menthol, Topical Analgesic, (Flexall 454) 16 % GEL, Apply topically  , Disp: , Rfl:     zolpidem (AMBIEN) 10 mg tablet, , Disp: , Rfl:     triamcinolone (KENALOG) 0 5 % ointment, Apply topically 2 (two) times a day, Disp: 30 g, Rfl: 1    Allergies   Allergen Reactions    Codeine Hives     Reaction Date: [id number];     Cortisone     Penicillins Hives     Reaction Date: [id number];        Vitals:    01/14/19 0942 01/14/19 1012   BP: 140/80    Pulse: (!) 122 104   Resp: 16    SpO2: 98%    Weight: 85 7 kg (189 lb)    Height: 5' 8 5" (1 74 m)

## 2019-02-05 DIAGNOSIS — L23.7 ALLERGIC CONTACT DERMATITIS DUE TO PLANTS, EXCEPT FOOD: ICD-10-CM

## 2019-02-05 RX ORDER — TRIAMCINOLONE ACETONIDE 5 MG/G
OINTMENT TOPICAL
Qty: 30 G | Refills: 1 | Status: SHIPPED | OUTPATIENT
Start: 2019-02-05 | End: 2019-02-11 | Stop reason: ALTCHOICE

## 2019-02-11 ENCOUNTER — OFFICE VISIT (OUTPATIENT)
Dept: FAMILY MEDICINE CLINIC | Facility: CLINIC | Age: 64
End: 2019-02-11
Payer: MEDICARE

## 2019-02-11 VITALS
TEMPERATURE: 98.2 F | SYSTOLIC BLOOD PRESSURE: 150 MMHG | BODY MASS INDEX: 27.49 KG/M2 | DIASTOLIC BLOOD PRESSURE: 86 MMHG | WEIGHT: 185.6 LBS | RESPIRATION RATE: 18 BRPM | HEIGHT: 69 IN | HEART RATE: 110 BPM | OXYGEN SATURATION: 98 %

## 2019-02-11 DIAGNOSIS — B35.6 TINEA CRURIS: ICD-10-CM

## 2019-02-11 PROCEDURE — 99213 OFFICE O/P EST LOW 20 MIN: CPT | Performed by: PHYSICIAN ASSISTANT

## 2019-02-11 RX ORDER — CLOTRIMAZOLE 1 %
CREAM (GRAM) TOPICAL
Qty: 28 G | Refills: 0 | Status: CANCELLED | OUTPATIENT
Start: 2019-02-11

## 2019-02-11 RX ORDER — CLOTRIMAZOLE AND BETAMETHASONE DIPROPIONATE 10; .64 MG/G; MG/G
CREAM TOPICAL 2 TIMES DAILY
Qty: 30 G | Refills: 0 | Status: SHIPPED | OUTPATIENT
Start: 2019-02-11 | End: 2019-02-17 | Stop reason: SDUPTHER

## 2019-02-11 NOTE — PROGRESS NOTES
Assessment/Plan:     Diagnoses and all orders for this visit:    Tinea cruris  Patient was previously diagnosed with tinea cruris but stopped applying clotrimazole and only using topical steroid  Does not appear to be cellulitis (no redness, warmth or swelling)  Discussed that antibiotics will not change course  Patient wanted to ensure that he had something to help with the itching in addition to the cause of the rash  - Starting on combo antifungal and steroid  - Directed to FU in 4 weeks if symptoms persist   -     clotrimazole-betamethasone (LOTRISONE) 1-0 05 % cream; Apply topically 2 (two) times a day          Subjective:    Patient ID: Artie Puente is a 59 y o  male  Pt is presenting today for Burning and itching rash on lower legs/groin for 1 month  He was diagnosed with tinea cruris but stopped using his clortimazole after one week  He has been applying Triamcinalone twice daily for the rash  It has improving the rash but he continues to have the redness and itchiness  He feels that this it started after being exposed to "chemicals" when putting away his [employer] tree  He is requesting an oral antibiotic because he is worried that it is similar to his spider bite from 4 years ago  He denies any drainage  He has been taking Benadryl nightly to help with his itching  Rash spares his perineum, scrotum and only impacts his medial thighs  Rash   The current episode started more than 1 month ago  The affected locations include the groin  The rash is characterized by redness, burning and itchiness  Past treatments include topical steroids  His past medical history is significant for allergies and asthma  The following portions of the patient's history were reviewed and updated as appropriate: allergies, current medications, past social history and problem list     Review of Systems   Skin: Positive for rash           Objective:  /86   Pulse (!) 110   Temp 98 2 °F (36 8 °C)   Resp 18   Ht 5' 8 5" (1 74 m)   Wt 84 2 kg (185 lb 9 6 oz)   SpO2 98%   BMI 27 81 kg/m²      Physical Exam   Constitutional: He is oriented to person, place, and time  He appears well-developed and well-nourished  No distress  HENT:   Head: Normocephalic and atraumatic  Cardiovascular: Normal rate, regular rhythm, normal heart sounds and intact distal pulses  Exam reveals no gallop and no friction rub  No murmur heard  Pulmonary/Chest: Effort normal and breath sounds normal  No respiratory distress  Neurological: He is alert and oriented to person, place, and time  Skin: Skin is warm and dry  He is not diaphoretic  Psychiatric: He has a normal mood and affect  His behavior is normal  Thought content normal    Vitals reviewed

## 2019-02-17 DIAGNOSIS — B35.6 TINEA CRURIS: ICD-10-CM

## 2019-02-18 RX ORDER — CLOTRIMAZOLE AND BETAMETHASONE DIPROPIONATE 10; .64 MG/G; MG/G
CREAM TOPICAL
Qty: 30 G | Refills: 0 | Status: SHIPPED | OUTPATIENT
Start: 2019-02-18 | End: 2019-03-28 | Stop reason: SDUPTHER

## 2019-02-19 ENCOUNTER — OFFICE VISIT (OUTPATIENT)
Dept: FAMILY MEDICINE CLINIC | Facility: CLINIC | Age: 64
End: 2019-02-19
Payer: MEDICARE

## 2019-02-19 VITALS
OXYGEN SATURATION: 99 % | WEIGHT: 185 LBS | DIASTOLIC BLOOD PRESSURE: 84 MMHG | HEIGHT: 69 IN | SYSTOLIC BLOOD PRESSURE: 132 MMHG | RESPIRATION RATE: 16 BRPM | HEART RATE: 98 BPM | BODY MASS INDEX: 27.4 KG/M2

## 2019-02-19 DIAGNOSIS — L50.9 URTICARIA: Primary | ICD-10-CM

## 2019-02-19 PROCEDURE — 99213 OFFICE O/P EST LOW 20 MIN: CPT | Performed by: FAMILY MEDICINE

## 2019-02-19 RX ORDER — CIPROFLOXACIN 500 MG/1
500 TABLET, FILM COATED ORAL EVERY 12 HOURS SCHEDULED
Qty: 14 TABLET | Refills: 0 | Status: SHIPPED | OUTPATIENT
Start: 2019-02-19 | End: 2019-02-26

## 2019-02-19 RX ORDER — FLUCONAZOLE 150 MG/1
150 TABLET ORAL ONCE
Qty: 2 TABLET | Refills: 0 | Status: SHIPPED | OUTPATIENT
Start: 2019-02-19 | End: 2019-02-19

## 2019-02-19 NOTE — ASSESSMENT & PLAN NOTE
Rash for few weeks has been seen by at least 3 times with different providers, and rash is not improving he is convinced that it is was started by some insect bite and he wants to take antibiotic, her start him on Cipro and Diflucan possible element of fungal infection to, discussed with him to see dermatologist patient refused and advised to follow up in 1-2 weeks if his rash does not improve and also advised to have physical so he can have his complete checkup

## 2019-02-19 NOTE — PROGRESS NOTES
Assessment/Plan:    Problem List Items Addressed This Visit        Musculoskeletal and Integument    Urticaria - Primary     Rash for few weeks has been seen by at least 3 times with different providers, and rash is not improving he is convinced that it is was started by some insect bite and he wants to take antibiotic, her start him on Cipro and Diflucan possible element of fungal infection to, discussed with him to see dermatologist patient refused and advised to follow up in 1-2 weeks if his rash does not improve and also advised to have physical so he can have his complete checkup         Relevant Medications    ciprofloxacin (CIPRO) 500 mg tablet    fluconazole (DIFLUCAN) 150 mg tablet          Chief Complaint   Patient presents with    Rash       Subjective:   Patient ID: Helena Hirsch is a 59 y o  male  He has rash on his both legs going on for few weeks now, he went to ER 1st and was treated with prednisone as he had some contact with some plant and he says the rash started immediately after he was in contact with plant , he thought it might be bit by something later he came in the office and he was treated with topical steroids and anti fungal creams the rash is not improving he complains of burning pain and currently using Lotrisone all he says the rash is only in the upper thighs it does not going to groin and he denies fever chills,  He is convinced that he might have been bit by the spider or something and he wants to take antibiotic      Review of Systems   Constitutional: Negative for activity change, appetite change, chills, diaphoresis, fatigue, fever and unexpected weight change  HENT: Negative for congestion, dental problem, ear discharge, ear pain, facial swelling, hearing loss, mouth sores, nosebleeds, postnasal drip, rhinorrhea, sinus pressure, sinus pain, sneezing, sore throat, trouble swallowing and voice change  Eyes: Negative for photophobia, pain, discharge, redness and itching  Respiratory: Negative for cough, chest tightness, shortness of breath and wheezing  Cardiovascular: Negative for chest pain, palpitations and leg swelling  Gastrointestinal: Negative for abdominal distention, abdominal pain, blood in stool, constipation, diarrhea and nausea  Endocrine: Negative for cold intolerance, heat intolerance, polydipsia, polyphagia and polyuria  Genitourinary: Negative for dysuria, flank pain, frequency, hematuria and urgency  Musculoskeletal: Negative for arthralgias, back pain, myalgias and neck pain  Skin: Negative for color change and pallor  Allergic/Immunologic: Negative for environmental allergies and food allergies  Neurological: Negative for dizziness, weakness, light-headedness, numbness and headaches  Hematological: Negative for adenopathy  Does not bruise/bleed easily  Psychiatric/Behavioral: Negative for behavioral problems, sleep disturbance and suicidal ideas  The patient is not nervous/anxious  Objective:  Physical Exam   Constitutional: He appears well-developed and well-nourished  HENT:   Head: Normocephalic and atraumatic  Mouth/Throat: Oropharynx is clear and moist    Eyes: Pupils are equal, round, and reactive to light  Conjunctivae and EOM are normal  No scleral icterus  Neck: Normal range of motion  Neck supple  No thyromegaly present  Cardiovascular: Normal rate, regular rhythm and normal heart sounds  Pulmonary/Chest: Effort normal and breath sounds normal  He has no wheezes  He has no rales  Lymphadenopathy:     He has no cervical adenopathy  Neurological: He is alert  Skin: Rash noted  No erythema  He has rash on his both thighs, his blanchable hyperemic looking nontender and very dry skin involving below the groin going up to the lower part of the thigh and it is bilateral   Psychiatric: He has a normal mood and affect  Nursing note and vitals reviewed           Past Surgical History:   Procedure Laterality Date  KIDNEY STONE SURGERY      pt poor historian       Family History   Problem Relation Age of Onset    No Known Problems Mother          Current Outpatient Medications:     albuterol (PROAIR HFA) 90 mcg/act inhaler, Inhale 2 puffs every 6 (six) hours as needed for wheezing, Disp: 1 Inhaler, Rfl: 3    clonazePAM (KlonoPIN) 0 5 mg tablet, Take by mouth 4 (four) times a day as needed, Disp: , Rfl:     clotrimazole (LOTRIMIN) 1 % cream, Apply to affected area 2 times daily, Disp: 28 g, Rfl: 0    clotrimazole-betamethasone (LOTRISONE) 1-0 05 % cream, APPLY TO AFFECTED AREA TWICE A DAY, Disp: 30 g, Rfl: 0    [employer] ALLERGY 25 MG capsule, Take 1 capsule (25 mg total) by mouth daily at bedtime, Disp: 30 capsule, Rfl: 1    cyclobenzaprine (FLEXERIL) 10 mg tablet, Take 10 mg by mouth 3 (three) times a day as needed for muscle spasms, Disp: , Rfl:     diphenhydrAMINE (BENADRYL) 25 mg capsule, Take 1 capsule (25 mg total) by mouth every 6 (six) hours as needed for itching, Disp: 30 capsule, Rfl: 1    EPINEPHrine (EPIPEN 2-ARUN) 0 3 mg/0 3 mL SOAJ, Use in case of anaphylaxis reaction, Disp: 1 each, Rfl: 1    fluticasone (FLONASE) 50 mcg/act nasal spray, Used 2 spray each nostril once a day, Disp: 1 Bottle, Rfl: 5    ibuprofen (MOTRIN) 600 mg tablet, Take by mouth, Disp: , Rfl:     lidocaine (LIDODERM) 5 %, Place 1 patch on the skin every 24 hours Remove & Discard patch within 12 hours or as directed by MD, Disp: , Rfl:     Menthol, Topical Analgesic, (Flexall 454) 16 % GEL, Apply topically  , Disp: , Rfl:     zolpidem (AMBIEN) 10 mg tablet, , Disp: , Rfl:     ciprofloxacin (CIPRO) 500 mg tablet, Take 1 tablet (500 mg total) by mouth every 12 (twelve) hours for 7 days, Disp: 14 tablet, Rfl: 0    fluconazole (DIFLUCAN) 150 mg tablet, Take 1 tablet (150 mg total) by mouth once for 1 dose Take 1 tab po once and then repeat 1 tab after 1 week, Disp: 2 tablet, Rfl: 0    Allergies   Allergen Reactions    Codeine Hives Reaction Date: [id number];     Cortisone     Penicillins Hives     Reaction Date: [id number];        Vitals:    02/19/19 1112   BP: 132/84   Pulse: 98   Resp: 16   SpO2: 99%   Weight: 83 9 kg (185 lb)   Height: 5' 8 5" (1 74 m)

## 2019-03-12 ENCOUNTER — OFFICE VISIT (OUTPATIENT)
Dept: FAMILY MEDICINE CLINIC | Facility: CLINIC | Age: 64
End: 2019-03-12
Payer: MEDICARE

## 2019-03-12 VITALS
TEMPERATURE: 98.2 F | DIASTOLIC BLOOD PRESSURE: 90 MMHG | RESPIRATION RATE: 18 BRPM | SYSTOLIC BLOOD PRESSURE: 130 MMHG | HEIGHT: 69 IN | BODY MASS INDEX: 27.64 KG/M2 | HEART RATE: 100 BPM | WEIGHT: 186.6 LBS | OXYGEN SATURATION: 98 %

## 2019-03-12 DIAGNOSIS — F41.9 ANXIETY: ICD-10-CM

## 2019-03-12 DIAGNOSIS — J00 ACUTE NASOPHARYNGITIS: Primary | ICD-10-CM

## 2019-03-12 PROCEDURE — 99213 OFFICE O/P EST LOW 20 MIN: CPT | Performed by: PHYSICIAN ASSISTANT

## 2019-03-12 NOTE — PROGRESS NOTES
Assessment/Plan:     Diagnoses and all orders for this visit:    Acute nasopharyngitis  A significant amount of time was spent discussing his OTC medications and recommending he limit how much he takes  Recommended taking them on a schedule  Lungs clear to auscultation, no wheezing  Patient strongly requested an antibiotic for his runny nose  Discussed use of antibiotics may make his symptoms worse  - advised to continue OTC supportive care with Tylenol/Motrin, Mucinex and saline gargle   - Recommended saline spray for nasal congestion  - encouraged rest and fluid intake   - educated Pt that cough can take 10-14 days total to resolve  - directed patient to use albuterol for SOB or wheezing  - directed to return if fever over 102, symptoms persist for 14 days, thick productive cough        Discussed case with Dr Mihcelle Veronica    Subjective:    Patient ID: Michael Canas is a 59 y o  male  Pt is presenting today for Dry cough, sinus pressure, Sore throat, headache, starting yesterday afternoon  Last night he took Nyquil and Robuitussin  This morning he took jose seltzer cold and Excedrin for his headache  All of the medications appear to be providing significant relief  He used his albuterol inhaler this morning when he thought he was wheezing  Patient recelived flu vaccination this year  Over the weekend he was exposed in people with a cough  He continues to be very anxious regarding his health  He discussed his work on [employer] and how he survived that and does not want a cold to "do him in "    URI    There has been no fever  Associated symptoms include congestion, coughing, headaches and rhinorrhea  Pertinent negatives include no diarrhea, ear pain, nausea, plugged ear sensation, sinus pain, sneezing, sore throat, vomiting or wheezing  He has tried inhaler use for the symptoms  The treatment provided moderate relief       The following portions of the patient's history were reviewed and updated as appropriate: allergies, current medications and problem list     Review of Systems   HENT: Positive for congestion and rhinorrhea  Negative for ear pain, sinus pain, sneezing and sore throat  Respiratory: Positive for cough  Negative for wheezing  Gastrointestinal: Negative for diarrhea, nausea and vomiting  Neurological: Positive for headaches  Objective:  /90   Pulse 100   Temp 98 2 °F (36 8 °C)   Resp 18   Ht 5' 8 5" (1 74 m)   Wt 84 6 kg (186 lb 9 6 oz)   SpO2 98%   BMI 27 96 kg/m²      Physical Exam   Constitutional: He is oriented to person, place, and time  He appears well-developed and well-nourished  HENT:   Head: Normocephalic and atraumatic  Right Ear: Tympanic membrane, external ear and ear canal normal    Left Ear: Tympanic membrane, external ear and ear canal normal    Nose: Nose normal  No rhinorrhea  Mouth/Throat: Oropharynx is clear and moist  No oropharyngeal exudate or posterior oropharyngeal erythema  Cardiovascular: Normal rate, regular rhythm and normal heart sounds  Exam reveals no gallop and no friction rub  No murmur heard  Pulmonary/Chest: Effort normal and breath sounds normal  He has no wheezes  He has no rales  Lymphadenopathy:        Head (right side): No submental, no submandibular, no tonsillar, no preauricular and no posterior auricular adenopathy present  Head (left side): No submental, no submandibular, no tonsillar, no preauricular and no posterior auricular adenopathy present  He has no cervical adenopathy  Neurological: He is alert and oriented to person, place, and time  Skin: Skin is warm and dry  Psychiatric: He has a normal mood and affect  His behavior is normal  Thought content normal    Vitals reviewed

## 2019-03-18 DIAGNOSIS — L23.7 ALLERGIC CONTACT DERMATITIS DUE TO PLANTS, EXCEPT FOOD: ICD-10-CM

## 2019-03-18 RX ORDER — TRIAMCINOLONE ACETONIDE 5 MG/G
OINTMENT TOPICAL
Qty: 30 G | Refills: 1 | OUTPATIENT
Start: 2019-03-18

## 2019-03-19 ENCOUNTER — HOSPITAL ENCOUNTER (EMERGENCY)
Facility: HOSPITAL | Age: 64
Discharge: HOME | End: 2019-03-19
Attending: EMERGENCY MEDICINE
Payer: MEDICARE

## 2019-03-19 VITALS
SYSTOLIC BLOOD PRESSURE: 154 MMHG | RESPIRATION RATE: 18 BRPM | TEMPERATURE: 98.5 F | DIASTOLIC BLOOD PRESSURE: 91 MMHG | OXYGEN SATURATION: 99 % | BODY MASS INDEX: 27.18 KG/M2 | HEART RATE: 100 BPM | WEIGHT: 181.4 LBS

## 2019-03-19 DIAGNOSIS — L85.3 DRY SKIN DERMATITIS: ICD-10-CM

## 2019-03-19 DIAGNOSIS — T78.40XA ACUTE ALLERGIC REACTION, INITIAL ENCOUNTER: ICD-10-CM

## 2019-03-19 DIAGNOSIS — B35.6 TINEA CRURIS: Primary | ICD-10-CM

## 2019-03-19 PROCEDURE — 99283 EMERGENCY DEPT VISIT LOW MDM: CPT

## 2019-03-19 RX ORDER — CLOTRIMAZOLE 1 %
CREAM (GRAM) TOPICAL
Qty: 40 G | Refills: 0 | Status: SHIPPED | OUTPATIENT
Start: 2019-03-19 | End: 2019-11-20 | Stop reason: SDUPTHER

## 2019-03-19 RX ORDER — TRIAMCINOLONE ACETONIDE 5 MG/G
OINTMENT TOPICAL 2 TIMES DAILY
Qty: 30 G | Refills: 0 | Status: SHIPPED | OUTPATIENT
Start: 2019-03-19 | End: 2020-03-31 | Stop reason: SDUPTHER

## 2019-03-19 NOTE — ED PROVIDER NOTES
History  Chief Complaint   Patient presents with    Allergic Reaction     pt was seen here for allergic reaction to chemicals on pants- threw away wrong pair  wore then bad pair today and is now having a reaction again  states medications given last time worked well, wants same today  took benadryl PTA  History provided by:  Patient  History limited by:  Psychiatric disorder (Very poor historian,)  Allergic Reaction   Presenting symptoms: rash    Severity:  Moderate  Duration:  3 days  Prior episodes: Multiple prior episodes, patient believes it is related to chemicals or sprayed on his lawn  In the past has been diagnosed with fungal infection  Patient very poor historian  Context comment:  Patient believes he was re-exposed to a pair pants he wore when chemicals were sprayed on his lawn developing a pruritic erythematous rash in his medial thighs bilaterally  Relieved by:  None tried  Worsened by:  Nothing  Ineffective treatments:  None tried      Prior to Admission Medications   Prescriptions Last Dose Informant Patient Reported? Taking? [facility] ALLERGY 25 MG capsule   No No   Sig: Take 1 capsule (25 mg total) by mouth daily at bedtime   EPINEPHrine (EPIPEN 2-ARUN) 0 3 mg/0 3 mL SOAJ   No No   Sig: Use in case of anaphylaxis reaction   Menthol, Topical Analgesic, (Flexall 454) 16 % GEL   Yes No   Sig: Apply topically     albuterol (PROAIR HFA) 90 mcg/act inhaler   No No   Sig: Inhale 2 puffs every 6 (six) hours as needed for wheezing   clonazePAM (KlonoPIN) 0 5 mg tablet   Yes No   Sig: Take by mouth 4 (four) times a day as needed   clotrimazole (LOTRIMIN) 1 % cream   No No   Sig: Apply to affected area 2 times daily   clotrimazole-betamethasone (LOTRISONE) 1-0 05 % cream   No No   Sig: APPLY TO AFFECTED AREA TWICE A DAY   cyclobenzaprine (FLEXERIL) 10 mg tablet   Yes No   Sig: Take 10 mg by mouth 3 (three) times a day as needed for muscle spasms   diphenhydrAMINE (BENADRYL) 25 mg capsule   No No   Sig: Take 1 capsule (25 mg total) by mouth every 6 (six) hours as needed for itching   fluticasone (FLONASE) 50 mcg/act nasal spray   No No   Sig: Used 2 spray each nostril once a day   ibuprofen (MOTRIN) 600 mg tablet   Yes No   Sig: Take by mouth   lidocaine (LIDODERM) 5 %   Yes No   Sig: Place 1 patch on the skin every 24 hours Remove & Discard patch within 12 hours or as directed by MD   zolpidem (AMBIEN) 10 mg tablet   Yes No      Facility-Administered Medications: None       Past Medical History:   Diagnosis Date    Allergic rhinitis 01/13/2011    Anxiety     Asthma     Insomnia     Kidney stone     Myalgia and Myositis 11/13/2007    Pain     chronic neck,back, spine    Psychiatric disorder     Temporomandibular joint disorder 01/06/2009       Past Surgical History:   Procedure Laterality Date    KIDNEY STONE SURGERY      pt poor historian       Family History   Problem Relation Age of Onset    No Known Problems Mother      I have reviewed and agree with the history as documented  Social History     Tobacco Use    Smoking status: Never Smoker    Smokeless tobacco: Never Used   Substance Use Topics    Alcohol use: No    Drug use: No        Review of Systems   Constitutional: Negative for fever  Respiratory: Negative for chest tightness and shortness of breath  Cardiovascular: Negative for chest pain  Skin: Positive for rash  Neurological: Negative for dizziness, light-headedness and headaches  Physical Exam  Physical Exam   Constitutional: He appears well-developed  HENT:   Head: Atraumatic  Eyes: Conjunctivae are normal  Right eye exhibits no discharge  Left eye exhibits no discharge  No scleral icterus  Neck: Neck supple  No tracheal deviation present  Pulmonary/Chest: Effort normal  No stridor  No respiratory distress  Musculoskeletal: He exhibits no deformity  Neurological: He is alert  He exhibits normal muscle tone  Coordination normal    Skin: Skin is warm and dry  Rash noted  No erythema  No pallor  Bilateral inner thighs, dry scaly rash, multiple areas of excoriation    Psychiatric: He has a normal mood and affect  Vitals reviewed  Vital Signs  ED Triage Vitals [03/19/19 1148]   Temperature Pulse Respirations Blood Pressure SpO2   98 5 °F (36 9 °C) 100 18 154/91 99 %      Temp Source Heart Rate Source Patient Position - Orthostatic VS BP Location FiO2 (%)   Oral -- -- -- --      Pain Score       No Pain           Vitals:    03/19/19 1148   BP: 154/91   Pulse: 100         Visual Acuity      ED Medications  Medications - No data to display    Diagnostic Studies  Results Reviewed     None                 No orders to display              Procedures  Procedures       Phone Contacts  ED Phone Contact    ED Course                               MDM  Number of Diagnoses or Management Options  Acute allergic reaction, initial encounter: new and requires workup  Dry skin dermatitis: new and requires workup  Tinea cruris: new and requires workup  Diagnosis management comments: Very anxious 67-year-old male extremely poor historian likely due to psychiatric disorder, keeps repeating the same story over and over about chemicals that got on his pants around [place] time and that he put on the same pair pants and believes he now has an allergy from it, history of fungal infection in this area patient scratching the area limiting identification  , patient insistent that the only thing that helped in the past was Lotrimin cream   I am not completely convinced this is fungal at this time but as this helped him in the past, will reapply it  To me it seems to be more dry skin dermatitis, I said in addition to Lotrimin cream I would use just general moisturizing lotion multiple times a day over the area    Patient follow up PCP and Dermatology       Amount and/or Complexity of Data Reviewed  Review and summarize past medical records: yes        Disposition  Final diagnoses:   Acute allergic reaction, initial encounter   Tinea cruris   Dry skin dermatitis     Time reflects when diagnosis was documented in both MDM as applicable and the Disposition within this note     Time User Action Codes Description Comment    3/19/2019 12:33 PM Rubiosti, [address] [facility] Acute allergic reaction, initial encounter     3/19/2019 12:34 PM Fremirella Dougherty, [address] [facility] [B35 6] Tinea cruris     3/19/2019 12:34 PM Johanna LEAL Add [L85 3] Dry skin dermatitis     3/19/2019 12:34 PM Agresti, [address] Acute allergic reaction, initial encounter     3/19/2019 12:34 PM Verneda Dowse Modify [B35 6] Tinea cruris       ED Disposition     ED Disposition Condition Date/Time Comment    Discharge Stable Tue Mar 19, 2019 12:33 PM Jorge Andujar discharge to home/self care  Follow-up Information    None         Discharge Medication List as of 3/19/2019 12:34 PM      START taking these medications    Details   !! clotrimazole (LOTRIMIN) 1 % cream Apply to affected area 2 times daily, Print       !! - Potential duplicate medications found  Please discuss with provider        CONTINUE these medications which have NOT CHANGED    Details   albuterol (PROAIR HFA) 90 mcg/act inhaler Inhale 2 puffs every 6 (six) hours as needed for wheezing, Starting Mon 1/14/2019, Normal      clonazePAM (KlonoPIN) 0 5 mg tablet Take by mouth 4 (four) times a day as needed, Starting Mon 12/19/2011, Historical Med      !! clotrimazole (LOTRIMIN) 1 % cream Apply to affected area 2 times daily, Print      clotrimazole-betamethasone (LOTRISONE) 1-0 05 % cream APPLY TO AFFECTED AREA TWICE A DAY, Normal      !! [facility] ALLERGY 25 MG capsule Take 1 capsule (25 mg total) by mouth daily at bedtime, Starting Mon 2/26/2018, Normal      cyclobenzaprine (FLEXERIL) 10 mg tablet Take 10 mg by mouth 3 (three) times a day as needed for muscle spasms, Historical Med      !! diphenhydrAMINE (BENADRYL) 25 mg capsule Take 1 capsule (25 mg total) by mouth every 6 (six) hours as needed for itching, Starting Mon 1/14/2019, Normal      EPINEPHrine (EPIPEN 2-ARUN) 0 3 mg/0 3 mL SOAJ Use in case of anaphylaxis reaction, Normal      fluticasone (FLONASE) 50 mcg/act nasal spray Used 2 spray each nostril once a day, Normal      ibuprofen (MOTRIN) 600 mg tablet Take by mouth, Starting Mon 10/23/2017, Historical Med      lidocaine (LIDODERM) 5 % Place 1 patch on the skin every 24 hours Remove & Discard patch within 12 hours or as directed by MD, Until Discontinued, Historical Med      Menthol, Topical Analgesic, (Flexall 454) 16 % GEL Apply topically  , Until Discontinued, Historical Med      zolpidem (AMBIEN) 10 mg tablet Starting Sun 2/25/2018, Historical Med       !! - Potential duplicate medications found  Please discuss with provider  No discharge procedures on file      ED Provider  Electronically Signed by           Bryan Diane DO  03/19/19 1154

## 2019-03-28 DIAGNOSIS — B35.6 TINEA CRURIS: ICD-10-CM

## 2019-03-28 RX ORDER — CLOTRIMAZOLE AND BETAMETHASONE DIPROPIONATE 10; .64 MG/G; MG/G
1 CREAM TOPICAL 2 TIMES DAILY
Qty: 30 G | Refills: 0 | Status: SHIPPED | OUTPATIENT
Start: 2019-03-28 | End: 2020-03-31

## 2019-06-09 ENCOUNTER — HOSPITAL ENCOUNTER (EMERGENCY)
Facility: HOSPITAL | Age: 64
Discharge: HOME | End: 2019-06-09
Attending: EMERGENCY MEDICINE | Admitting: EMERGENCY MEDICINE
Payer: MEDICARE

## 2019-06-09 VITALS
RESPIRATION RATE: 20 BRPM | BODY MASS INDEX: 27.42 KG/M2 | WEIGHT: 183 LBS | HEART RATE: 99 BPM | SYSTOLIC BLOOD PRESSURE: 152 MMHG | TEMPERATURE: 98 F | OXYGEN SATURATION: 98 % | DIASTOLIC BLOOD PRESSURE: 91 MMHG

## 2019-06-09 DIAGNOSIS — H57.89 REDNESS OF EYE, LEFT: ICD-10-CM

## 2019-06-09 DIAGNOSIS — H57.89 BURNING SENSATION OF EYE: Primary | ICD-10-CM

## 2019-06-09 PROCEDURE — 99283 EMERGENCY DEPT VISIT LOW MDM: CPT | Performed by: EMERGENCY MEDICINE

## 2019-06-09 PROCEDURE — 99282 EMERGENCY DEPT VISIT SF MDM: CPT

## 2019-06-09 RX ORDER — OFLOXACIN 3 MG/ML
2 SOLUTION/ DROPS OPHTHALMIC 4 TIMES DAILY
Qty: 5 ML | Refills: 0 | Status: SHIPPED | OUTPATIENT
Start: 2019-06-09 | End: 2019-06-16

## 2019-06-09 RX ORDER — OLOPATADINE HYDROCHLORIDE 1 MG/ML
1 SOLUTION/ DROPS OPHTHALMIC 2 TIMES DAILY
Qty: 5 ML | Refills: 0 | Status: SHIPPED | OUTPATIENT
Start: 2019-06-09

## 2019-06-09 RX ORDER — TETRACAINE HYDROCHLORIDE 5 MG/ML
1 SOLUTION OPHTHALMIC ONCE
Status: COMPLETED | OUTPATIENT
Start: 2019-06-09 | End: 2019-06-09

## 2019-06-09 RX ADMIN — FLUORESCEIN SODIUM 1 STRIP: 1 STRIP OPHTHALMIC at 17:20

## 2019-06-09 RX ADMIN — TETRACAINE HYDROCHLORIDE 1 DROP: 5 SOLUTION OPHTHALMIC at 17:20

## 2019-07-04 DIAGNOSIS — T78.40XS ALLERGIC DISORDER, SEQUELA: ICD-10-CM

## 2019-07-08 RX ORDER — FLUTICASONE PROPIONATE 50 MCG
SPRAY, SUSPENSION (ML) NASAL
Qty: 48 ML | Refills: 1 | Status: SHIPPED | OUTPATIENT
Start: 2019-07-08 | End: 2019-12-16 | Stop reason: SDUPTHER

## 2019-07-10 ENCOUNTER — HOSPITAL ENCOUNTER (EMERGENCY)
Facility: HOSPITAL | Age: 64
Discharge: HOME | End: 2019-07-10
Attending: EMERGENCY MEDICINE | Admitting: EMERGENCY MEDICINE
Payer: MEDICARE

## 2019-07-10 ENCOUNTER — APPOINTMENT (EMERGENCY)
Dept: ULTRASOUND IMAGING | Facility: HOSPITAL | Age: 64
End: 2019-07-10
Payer: MEDICARE

## 2019-07-10 VITALS
TEMPERATURE: 99 F | RESPIRATION RATE: 18 BRPM | OXYGEN SATURATION: 99 % | DIASTOLIC BLOOD PRESSURE: 79 MMHG | WEIGHT: 182 LBS | HEART RATE: 112 BPM | HEIGHT: 69 IN | SYSTOLIC BLOOD PRESSURE: 172 MMHG | BODY MASS INDEX: 26.96 KG/M2

## 2019-07-10 DIAGNOSIS — N45.3 EPIDIDYMOORCHITIS: ICD-10-CM

## 2019-07-10 DIAGNOSIS — N43.3 HYDROCELE IN ADULT: Primary | ICD-10-CM

## 2019-07-10 PROCEDURE — 99283 EMERGENCY DEPT VISIT LOW MDM: CPT

## 2019-07-10 PROCEDURE — 99284 EMERGENCY DEPT VISIT MOD MDM: CPT | Performed by: EMERGENCY MEDICINE

## 2019-07-10 PROCEDURE — 76870 US EXAM SCROTUM: CPT

## 2019-07-10 RX ORDER — LEVOFLOXACIN 500 MG/1
500 TABLET, FILM COATED ORAL DAILY
Qty: 13 TABLET | Refills: 0 | OUTPATIENT
Start: 2019-07-10 | End: 2019-07-19

## 2019-07-10 RX ORDER — ACETAMINOPHEN 325 MG/1
650 TABLET ORAL ONCE
Status: COMPLETED | OUTPATIENT
Start: 2019-07-10 | End: 2019-07-10

## 2019-07-10 RX ORDER — LEVOFLOXACIN 500 MG/1
500 TABLET, FILM COATED ORAL ONCE
Status: COMPLETED | OUTPATIENT
Start: 2019-07-10 | End: 2019-07-10

## 2019-07-10 RX ADMIN — ACETAMINOPHEN 650 MG: 325 TABLET, FILM COATED ORAL at 19:03

## 2019-07-10 RX ADMIN — LEVOFLOXACIN 500 MG: 500 TABLET, FILM COATED ORAL at 20:17

## 2019-07-11 NOTE — DISCHARGE INSTRUCTIONS
Diagnosis: right epididymoorchitis with right hydrocele    - please wear firm scrotal support     - for pain- - would take  both over the counter generic tylenol 500 mg -  together with over the counter generic ibuprofen 400 mg - 4 times per day with meals/ liquids    - levaquin- antibiotic- starting tomorrow 1 tablet a day for the next 13 days- with meals    - please call  t he urologist tomorrow to schedule an appointment for a recheck within 1 week - you can see anyone in the group    - please return to  the er for any persistent fevers- temp > 100 4/ any worsening/ intractable pain / any shaking chills/ any persistent vomiting

## 2019-07-13 NOTE — ED PROVIDER NOTES
History  Chief Complaint   Patient presents with    Rash     patient reports having scrotal swelling and rash in genital area after getting home from [place]  reports having been in a pool and sabine  thinks this might be the cause  thinks it might be a reaction to something  unknown bug bites  no fevers  +headache  59 yr male returned from fall - yesterday was only in hot tub-- [place] ocean[place] -- c/o 1 day of r scrotal swelling/ redness/pain -- mild// headache- no fevers- chills/systemic comps-- no srotal trauma/sore/ abrasion -- no urinary comps- no penile  d/c      History provided by:  Patient   used: No    Rash   Associated symptoms: headaches        Prior to Admission Medications   Prescriptions Last Dose Informant Patient Reported? Taking? [facility] ALLERGY 25 MG capsule   No No   Sig: Take 1 capsule (25 mg total) by mouth daily at bedtime   EPINEPHrine (EPIPEN 2-ARUN) 0 3 mg/0 3 mL SOAJ   No No   Sig: Use in case of anaphylaxis reaction   Menthol, Topical Analgesic, (Flexall 454) 16 % GEL   Yes No   Sig: Apply topically     albuterol (PROAIR HFA) 90 mcg/act inhaler   No No   Sig: Inhale 2 puffs every 6 (six) hours as needed for wheezing   clonazePAM (KlonoPIN) 0 5 mg tablet   Yes No   Sig: Take by mouth 4 (four) times a day as needed   clotrimazole (LOTRIMIN) 1 % cream   No No   Sig: Apply to affected area 2 times daily   clotrimazole (LOTRIMIN) 1 % cream   No No   Sig: Apply to affected area 2 times daily   clotrimazole-betamethasone (LOTRISONE) 1-0 05 % cream   No No   Sig: Apply 1 application topically 2 (two) times a day To affected area   cyclobenzaprine (FLEXERIL) 10 mg tablet   Yes No   Sig: Take 10 mg by mouth 3 (three) times a day as needed for muscle spasms   diphenhydrAMINE (BENADRYL) 25 mg capsule   No No   Sig: Take 1 capsule (25 mg total) by mouth every 6 (six) hours as needed for itching   fluticasone (FLONASE) 50 mcg/act nasal spray   No No   Sig: USED 2 SPRAY EACH NOSTRIL ONCE A DAY   ibuprofen (MOTRIN) 600 mg tablet   Yes No   Sig: Take by mouth   lidocaine (LIDODERM) 5 %   Yes No   Sig: Place 1 patch on the skin every 24 hours Remove & Discard patch within 12 hours or as directed by MD   olopatadine (PATANOL) 0 1 % ophthalmic solution   No No   Sig: Administer 1 drop to both eyes 2 (two) times a day   triamcinolone (KENALOG) 0 5 % ointment   No No   Sig: Apply topically 2 (two) times a day   zolpidem (AMBIEN) 10 mg tablet   Yes No      Facility-Administered Medications: None       Past Medical History:   Diagnosis Date    Allergic rhinitis 01/13/2011    Anxiety     Asthma     Insomnia     Kidney stone     Myalgia and Myositis 11/13/2007    Pain     chronic neck,back, spine    Psychiatric disorder     Temporomandibular joint disorder 01/06/2009       Past Surgical History:   Procedure Laterality Date    KIDNEY STONE SURGERY      pt poor historian       Family History   Problem Relation Age of Onset    No Known Problems Mother      I have reviewed and agree with the history as documented  Social History     Tobacco Use    Smoking status: Never Smoker    Smokeless tobacco: Never Used   Substance Use Topics    Alcohol use: No    Drug use: No        Review of Systems   Constitutional: Negative  HENT: Negative  Eyes: Negative  Respiratory: Negative  Cardiovascular: Negative  Gastrointestinal: Negative  Endocrine: Negative  Genitourinary: Positive for scrotal swelling  Negative for decreased urine volume, difficulty urinating, discharge, dysuria, enuresis, flank pain, frequency, genital sores, hematuria, penile pain, penile swelling, testicular pain and urgency  Musculoskeletal: Negative  Skin: Positive for rash  Allergic/Immunologic: Negative  Neurological: Positive for headaches  Negative for dizziness, tremors, seizures, syncope, facial asymmetry, speech difficulty, weakness, light-headedness and numbness  Hematological: Negative  Psychiatric/Behavioral: Negative  Physical Exam  Physical Exam   Constitutional: He is oriented to person, place, and time  He appears well-developed and well-nourished  No distress  avss- mid tachy-- thnsive- anxious appearing-- pulse ox 97 % on ra- interpretation is normal- no intervention    HENT:   Head: Normocephalic and atraumatic  Eyes: Pupils are equal, round, and reactive to light  Conjunctivae and EOM are normal  Right eye exhibits no discharge  Left eye exhibits no discharge  No scleral icterus  Mm pink   Neck: Normal range of motion  Neck supple  No JVD present  No tracheal deviation present  No thyromegaly present  No meningeal signs   Cardiovascular: Regular rhythm, normal heart sounds and intact distal pulses  Exam reveals no gallop and no friction rub  No murmur heard  Pulmonary/Chest: Effort normal and breath sounds normal  No stridor  No respiratory distress  He has no wheezes  He has no rales  He exhibits no tenderness  Abdominal: Soft  Bowel sounds are normal  He exhibits no distension and no mass  There is no tenderness  There is no rebound and no guarding  No hernia  Soft nt/nd- no cva tenderness/ no peritoneal signs   Genitourinary: Penis normal    Genitourinary Comments: r sided scrotal tenderenss- erythema-- no open wounds- abscess-- no crepitus/necrosis-- pos r posterior testicle swelling/ tenderness-- normal perineal exam   Musculoskeletal: Normal range of motion  He exhibits no edema, tenderness or deformity  Lymphadenopathy:     He has no cervical adenopathy  Neurological: He is alert and oriented to person, place, and time  No cranial nerve deficit or sensory deficit  He exhibits normal muscle tone  Coordination normal    Skin: Skin is warm  No rash noted  He is not diaphoretic  There is erythema  No pallor  See gu description above   Psychiatric:   Anxious appearing   Nursing note and vitals reviewed        Vital Signs  ED Triage Vitals [07/10/19 1809] Temperature Pulse Respirations Blood Pressure SpO2   99 °F (37 2 °C) (!) 112 18 (!) 172/79 99 %      Temp Source Heart Rate Source Patient Position - Orthostatic VS BP Location FiO2 (%)   Oral Monitor Sitting Left arm --      Pain Score       Worst Possible Pain           Vitals:    07/10/19 1809   BP: (!) 172/79   Pulse: (!) 112   Patient Position - Orthostatic VS: Sitting         Visual Acuity      ED Medications  Medications   acetaminophen (TYLENOL) tablet 650 mg (650 mg Oral Given 7/10/19 1903)   levofloxacin (LEVAQUIN) tablet 500 mg (500 mg Oral Given 7/10/19 2017)       Diagnostic Studies  Results Reviewed     None                 US scrotum and testicles   Final Result by Love Ruelas MD (07/10 1951)       There is evidence of right-sided epididymoorchitis (enlarged hyperemic right epididymis, hyperemic right testicle, and associated moderate complex right hydrocele )       Workstation performed: [id number]                    Procedures  Procedures       ED Course                               MDM    Disposition  Final diagnoses:   Hydrocele in adult   Epididymoorchitis     Time reflects when diagnosis was documented in both MDM as applicable and the Disposition within this note     Time User Action Codes Description Comment    7/10/2019  8:19 PM Savannah MANDUJANO Add [N43 3] Hydrocele in adult     7/10/2019  8:19 PM Wu Mary Add [N45 3] Epididymoorchitis       ED Disposition     ED Disposition Condition Date/Time Comment    Discharge Stable Wed Jul 10, 2019  8:19 PM Samir Garciaigham discharge to home/self care              Follow-up Information     Follow up With Specialties Details Why Contact Info    Jessy Mcmahan MD Urology   [address] [facility] [address] [facility]  [phone]            Discharge Medication List as of 7/10/2019  8:25 PM      START taking these medications    Details   levofloxacin (LEVAQUIN) 500 mg tablet Take 1 tablet (500 mg total) by mouth daily for 13 doses, Starting Wed 7/10/2019, Until Tue 7/23/2019, Print         CONTINUE these medications which have NOT CHANGED    Details   albuterol (PROAIR HFA) 90 mcg/act inhaler Inhale 2 puffs every 6 (six) hours as needed for wheezing, Starting Mon 1/14/2019, Normal      clonazePAM (KlonoPIN) 0 5 mg tablet Take by mouth 4 (four) times a day as needed, Starting Mon 12/19/2011, Historical Med      !! clotrimazole (LOTRIMIN) 1 % cream Apply to affected area 2 times daily, Print      !! clotrimazole (LOTRIMIN) 1 % cream Apply to affected area 2 times daily, Print      clotrimazole-betamethasone (LOTRISONE) 1-0 05 % cream Apply 1 application topically 2 (two) times a day To affected area, Starting Thu 3/28/2019, Normal      !! [facility] ALLERGY 25 MG capsule Take 1 capsule (25 mg total) by mouth daily at bedtime, Starting Mon 2/26/2018, Normal      cyclobenzaprine (FLEXERIL) 10 mg tablet Take 10 mg by mouth 3 (three) times a day as needed for muscle spasms, Historical Med      !! diphenhydrAMINE (BENADRYL) 25 mg capsule Take 1 capsule (25 mg total) by mouth every 6 (six) hours as needed for itching, Starting Mon 1/14/2019, Normal      EPINEPHrine (EPIPEN 2-ARUN) 0 3 mg/0 3 mL SOAJ Use in case of anaphylaxis reaction, Normal      fluticasone (FLONASE) 50 mcg/act nasal spray USED 2 SPRAY EACH NOSTRIL ONCE A DAY, Normal      ibuprofen (MOTRIN) 600 mg tablet Take by mouth, Starting Mon 10/23/2017, Historical Med      lidocaine (LIDODERM) 5 % Place 1 patch on the skin every 24 hours Remove & Discard patch within 12 hours or as directed by MD, Until Discontinued, Historical Med      Menthol, Topical Analgesic, (Flexall 454) 16 % GEL Apply topically  , Until Discontinued, Historical Med      olopatadine (PATANOL) 0 1 % ophthalmic solution Administer 1 drop to both eyes 2 (two) times a day, Starting Sun 6/9/2019, Print      triamcinolone (KENALOG) 0 5 % ointment Apply topically 2 (two) times a day, Starting Tue 3/19/2019, Normal      zolpidem (AMBIEN) 10 mg tablet Starting Sun 2/25/2018, Historical Med       !! - Potential duplicate medications found  Please discuss with provider  No discharge procedures on file      ED Provider  Electronically Signed by           Jeremy Daniels MD  07/13/19 6285

## 2019-07-19 ENCOUNTER — HOSPITAL ENCOUNTER (EMERGENCY)
Facility: HOSPITAL | Age: 64
Discharge: HOME | End: 2019-07-19
Attending: EMERGENCY MEDICINE | Admitting: EMERGENCY MEDICINE
Payer: MEDICARE

## 2019-07-19 VITALS
WEIGHT: 184.08 LBS | TEMPERATURE: 97.7 F | OXYGEN SATURATION: 100 % | SYSTOLIC BLOOD PRESSURE: 143 MMHG | HEART RATE: 129 BPM | BODY MASS INDEX: 27.58 KG/M2 | DIASTOLIC BLOOD PRESSURE: 94 MMHG | RESPIRATION RATE: 22 BRPM

## 2019-07-19 DIAGNOSIS — L50.9 URTICARIA: Primary | ICD-10-CM

## 2019-07-19 DIAGNOSIS — N49.2 CELLULITIS OF SCROTUM: ICD-10-CM

## 2019-07-19 DIAGNOSIS — T78.40XA ALLERGIC REACTION TO DRUG, INITIAL ENCOUNTER: ICD-10-CM

## 2019-07-19 PROCEDURE — 99283 EMERGENCY DEPT VISIT LOW MDM: CPT | Performed by: PHYSICIAN ASSISTANT

## 2019-07-19 PROCEDURE — 99283 EMERGENCY DEPT VISIT LOW MDM: CPT

## 2019-07-19 RX ORDER — PREDNISONE 50 MG/1
50 TABLET ORAL DAILY
Qty: 5 TABLET | Refills: 0 | Status: SHIPPED | OUTPATIENT
Start: 2019-07-19 | End: 2019-07-24

## 2019-07-19 RX ORDER — DIPHENHYDRAMINE HCL 25 MG
25 TABLET ORAL EVERY 6 HOURS
Qty: 20 TABLET | Refills: 0 | Status: SHIPPED | OUTPATIENT
Start: 2019-07-19 | End: 2020-02-08

## 2019-07-19 RX ORDER — DOXYCYCLINE HYCLATE 100 MG/1
100 CAPSULE ORAL 2 TIMES DAILY
Qty: 8 CAPSULE | Refills: 0 | Status: SHIPPED | OUTPATIENT
Start: 2019-07-19 | End: 2019-07-23

## 2019-07-19 NOTE — DISCHARGE INSTRUCTIONS
Stop the Levaquin  Take doxycycline 1 pill twice daily for the next 4 days  Start prednisone and continue for 5 days  He may take Benadryl 50 mg every 6 hours as needed for itching and hives    Stay out of the sun because the doxycycline will make you photosensitive  Use sunscreen when your exposed to the sun

## 2019-07-19 NOTE — ED NOTES
Pt seen, assessed and d/c by provider  Pt appeared to be in no acute distress upon discharge  Pt able to ambulate well without assistance upon exiting        Shyann Lott RN  07/19/19 6424

## 2019-07-19 NOTE — ED PROVIDER NOTES
History  Chief Complaint   Patient presents with    Allergic Reaction     Pt  c/o hives and itching for 30 minutes  Pt  currently taking levofloxacin since 7/10/19 for UTI  Patient presents emergency room with complaints of a rash that started this morning  Is present on both thighs and inguinal areas as well as both anterior antecubital spaces in both axilla  He states that they are itchy but they do have tenderness to them with palpation  He denies any fever chills  He is presently taking Levaquin for urinary tract infection he is on day 9  He still has 4 more tablets to take  He denies any difficulty breathing  He denies any difficulty handling his secretions  He was seen recently for dysuria and scrotal swelling  The swelling of his scrotum is better  He had an US and was diagnosed with a hydrocele  No dysuria n He does not have an allergy to [id number]  He states that he is allergic to the injectable steroids  History provided by:  Patient  Rash   Location: both thighs, antecubital area and both axilla  Quality: redness    Severity:  Moderate  Onset quality:  Gradual  Duration:  2 hours  Timing:  Constant  Context: medications    Context: not animal contact, not chemical exposure, not diapers, not eggs, not exposure to similar rash, not food, not hot tub use, not insect bite/sting, not new detergent/soap, not nuts, not plant contact, not pollen, not pregnancy, not sick contacts and not sun exposure    Context comment:  Levaquin  Relieved by: Antihistamines  Worsened by:  Contact  Associated symptoms: no abdominal pain, no diarrhea, no fatigue, no fever, no headaches, no hoarse voice, no induration, no joint pain, no myalgias, no nausea, no periorbital edema, no shortness of breath, no sore throat, no throat swelling, no tongue swelling, no URI, not vomiting and not wheezing        Prior to Admission Medications   Prescriptions Last Dose Informant Patient Reported? Taking?    [facility] ALLERGY 25 MG capsule   No No   Sig: Take 1 capsule (25 mg total) by mouth daily at bedtime   EPINEPHrine (EPIPEN 2-ARUN) 0 3 mg/0 3 mL SOAJ   No No   Sig: Use in case of anaphylaxis reaction   Menthol, Topical Analgesic, (Flexall 454) 16 % GEL   Yes No   Sig: Apply topically     albuterol (PROAIR HFA) 90 mcg/act inhaler   No No   Sig: Inhale 2 puffs every 6 (six) hours as needed for wheezing   clonazePAM (KlonoPIN) 0 5 mg tablet   Yes No   Sig: Take by mouth 4 (four) times a day as needed   clotrimazole (LOTRIMIN) 1 % cream   No No   Sig: Apply to affected area 2 times daily   clotrimazole (LOTRIMIN) 1 % cream   No No   Sig: Apply to affected area 2 times daily   clotrimazole-betamethasone (LOTRISONE) 1-0 05 % cream   No No   Sig: Apply 1 application topically 2 (two) times a day To affected area   cyclobenzaprine (FLEXERIL) 10 mg tablet   Yes No   Sig: Take 10 mg by mouth 3 (three) times a day as needed for muscle spasms   diphenhydrAMINE (BENADRYL) 25 mg capsule   No No   Sig: Take 1 capsule (25 mg total) by mouth every 6 (six) hours as needed for itching   fluticasone (FLONASE) 50 mcg/act nasal spray   No No   Sig: USED 2 SPRAY EACH NOSTRIL ONCE A DAY   ibuprofen (MOTRIN) 600 mg tablet   Yes No   Sig: Take by mouth   levofloxacin (LEVAQUIN) 500 mg tablet   No No   Sig: Take 1 tablet (500 mg total) by mouth daily for 13 doses   lidocaine (LIDODERM) 5 %   Yes No   Sig: Place 1 patch on the skin every 24 hours Remove & Discard patch within 12 hours or as directed by MD   olopatadine (PATANOL) 0 1 % ophthalmic solution   No No   Sig: Administer 1 drop to both eyes 2 (two) times a day   triamcinolone (KENALOG) 0 5 % ointment   No No   Sig: Apply topically 2 (two) times a day   zolpidem (AMBIEN) 10 mg tablet   Yes No      Facility-Administered Medications: None       Past Medical History:   Diagnosis Date    Allergic rhinitis 01/13/2011    Anxiety     Asthma     Insomnia     Kidney stone     Myalgia and Myositis 11/13/2007  Pain     chronic neck,back, spine    Psychiatric disorder     Temporomandibular joint disorder 01/06/2009       Past Surgical History:   Procedure Laterality Date    KIDNEY STONE SURGERY      pt poor historian       Family History   Problem Relation Age of Onset    No Known Problems Mother      I have reviewed and agree with the history as documented  Social History     Tobacco Use    Smoking status: Never Smoker    Smokeless tobacco: Never Used   Substance Use Topics    Alcohol use: No    Drug use: No        Review of Systems   Constitutional: Negative for activity change, appetite change, chills, fatigue and fever  HENT: Negative for congestion, ear discharge, ear pain, hoarse voice, mouth sores, postnasal drip, rhinorrhea, sore throat and trouble swallowing  Eyes: Negative for pain, discharge, redness and itching  Respiratory: Negative for shortness of breath and wheezing  Gastrointestinal: Negative for abdominal pain, diarrhea, nausea and vomiting  Genitourinary: Positive for scrotal swelling  Negative for dysuria, frequency and urgency  Musculoskeletal: Negative for arthralgias and myalgias  Skin: Positive for color change and rash  Neurological: Negative for headaches  Psychiatric/Behavioral: Negative for confusion  All other systems reviewed and are negative  Physical Exam  Physical Exam   Constitutional: He appears well-developed and well-nourished  No distress  Patient is very anxious   HENT:   Head: Normocephalic  Right Ear: External ear normal    Left Ear: External ear normal    Nose: Nose normal    Mouth/Throat: Oropharynx is clear and moist    Eyes: Conjunctivae are normal  Right eye exhibits no discharge  Left eye exhibits no discharge  Neck: Neck supple  Cardiovascular: Normal rate, regular rhythm and normal heart sounds  Exam reveals no gallop and no friction rub  No murmur heard  Pulmonary/Chest: Effort normal and breath sounds normal  No stridor  No respiratory distress  He has no wheezes  He has no rales  Genitourinary: Penis normal    Genitourinary Comments: Swelling right scrotal area with erythema  Non tender   Neurological: He is alert  Skin: Capillary refill takes less than 2 seconds  He is not diaphoretic  There is erythema  Wheels noted on the inner thighs, antecubital and axillary areas  Psychiatric: His behavior is normal  Judgment and thought content normal    anxious   Nursing note and vitals reviewed  Vital Signs  ED Triage Vitals   Temperature Pulse Respirations Blood Pressure SpO2   07/19/19 0805 07/19/19 0805 07/19/19 0805 07/19/19 0805 07/19/19 0805   97 7 °F (36 5 °C) (!) 129 22 143/94 100 %      Temp Source Heart Rate Source Patient Position - Orthostatic VS BP Location FiO2 (%)   07/19/19 0805 -- -- -- --   Oral          Pain Score       07/19/19 0802       7           Vitals:    07/19/19 0805   BP: 143/94   Pulse: (!) 129         Visual Acuity      ED Medications  Medications - No data to display    Diagnostic Studies  Results Reviewed     None                 No orders to display              Procedures  Procedures       ED Course                               MDM  Number of Diagnoses or Management Options  Allergic reaction to drug, initial encounter: new and requires workup  Cellulitis of scrotum: established and improving  Urticaria: new and requires workup  Risk of Complications, Morbidity, and/or Mortality  Presenting problems: moderate  Diagnostic procedures: moderate  Management options: moderate  General comments: Patient presents emergency room for evaluation of hives that started this morning  He is presently being treated for a scrotal infection and urinary tract infection and is on Levaquin  He was instructed to stop the Levaquin  He was given a prescription for doxycycline to finish his course of treatment for the next 4 days  He was given a prescription also for prednisone and Benadryl    Should his symptoms worsen, he will return to the emergency room for repeat exam   He was little tachycardic on presentation but was a very anxious regarding his allergic reaction to the medications  His heart rate was normal on my exam     Patient Progress  Patient progress: stable      Disposition  Final diagnoses:   Urticaria   Allergic reaction to drug, initial encounter   Cellulitis of scrotum - Resolving     Time reflects when diagnosis was documented in both MDM as applicable and the Disposition within this note     Time User Action Codes Description Comment    7/19/2019  8:38 AM Julia Daunt Add [L50 9] Urticaria     7/19/2019  8:38 AM Aurelia Villanueva Add [T78 40XA] Allergic reaction to drug, initial encounter     7/19/2019  8:40 AM Julia Daunt Add [N49 2] Cellulitis of scrotum     7/19/2019  8:40 AM Julia Daunt Modify [N49 2] Cellulitis of scrotum Resolving      ED Disposition     ED Disposition Condition Date/Time Comment    Discharge Stable Fri Jul 19, 2019  8:39 AM Anshu Dominguez discharge to home/self care              Follow-up Information     Follow up With Specialties Details Why Contact Info Additional [address] Emergency Department Emergency Medicine  As needed, If symptoms worsen [address] [facility]  AN ED, [address], [place], [place], [address]          Discharge Medication List as of 7/19/2019  8:46 AM      START taking these medications    Details   diphenhydrAMINE (BENADRYL) 25 mg tablet Take 1 tablet (25 mg total) by mouth every 6 (six) hours As needed  for hives and itching, Starting Fri 7/19/2019, Normal      doxycycline hyclate (VIBRAMYCIN) 100 mg capsule Take 1 capsule (100 mg total) by mouth 2 (two) times a day for 8 doses, Starting Fri 7/19/2019, Until Tue 7/23/2019, Normal      predniSONE 50 mg tablet Take 1 tablet (50 mg total) by mouth daily for 5 days, Starting Fri 7/19/2019, Until Wed 7/24/2019, Normal         CONTINUE these medications which have NOT CHANGED    Details   albuterol (PROAIR HFA) 90 mcg/act inhaler Inhale 2 puffs every 6 (six) hours as needed for wheezing, Starting Mon 1/14/2019, Normal      clonazePAM (KlonoPIN) 0 5 mg tablet Take by mouth 4 (four) times a day as needed, Starting Mon 12/19/2011, Historical Med      !! clotrimazole (LOTRIMIN) 1 % cream Apply to affected area 2 times daily, Print      !! clotrimazole (LOTRIMIN) 1 % cream Apply to affected area 2 times daily, Print      clotrimazole-betamethasone (LOTRISONE) 1-0 05 % cream Apply 1 application topically 2 (two) times a day To affected area, Starting Thu 3/28/2019, Normal      !! [facility] ALLERGY 25 MG capsule Take 1 capsule (25 mg total) by mouth daily at bedtime, Starting Mon 2/26/2018, Normal      cyclobenzaprine (FLEXERIL) 10 mg tablet Take 10 mg by mouth 3 (three) times a day as needed for muscle spasms, Historical Med      !! diphenhydrAMINE (BENADRYL) 25 mg capsule Take 1 capsule (25 mg total) by mouth every 6 (six) hours as needed for itching, Starting Mon 1/14/2019, Normal      EPINEPHrine (EPIPEN 2-ARUN) 0 3 mg/0 3 mL SOAJ Use in case of anaphylaxis reaction, Normal      fluticasone (FLONASE) 50 mcg/act nasal spray USED 2 SPRAY EACH NOSTRIL ONCE A DAY, Normal      ibuprofen (MOTRIN) 600 mg tablet Take by mouth, Starting Mon 10/23/2017, Historical Med      lidocaine (LIDODERM) 5 % Place 1 patch on the skin every 24 hours Remove & Discard patch within 12 hours or as directed by MD, Until Discontinued, Historical Med      Menthol, Topical Analgesic, (Flexall 454) 16 % GEL Apply topically  , Until Discontinued, Historical Med      olopatadine (PATANOL) 0 1 % ophthalmic solution Administer 1 drop to both eyes 2 (two) times a day, Starting Sun 6/9/2019, Print      triamcinolone (KENALOG) 0 5 % ointment Apply topically 2 (two) times a day, Starting Tue 3/19/2019, Normal      zolpidem (AMBIEN) 10 mg tablet Starting Sun 2/25/2018, Historical Med       !! - Potential duplicate medications found  Please discuss with provider  STOP taking these medications       levofloxacin (LEVAQUIN) 500 mg tablet Comments:   Reason for Stopping:             No discharge procedures on file      ED Provider  Electronically Signed by           Celestina Saini PA-C  07/19/19 7431

## 2019-11-20 ENCOUNTER — HOSPITAL ENCOUNTER (EMERGENCY)
Facility: HOSPITAL | Age: 64
Discharge: HOME | End: 2019-11-20
Attending: EMERGENCY MEDICINE | Admitting: EMERGENCY MEDICINE
Payer: MEDICARE

## 2019-11-20 VITALS
OXYGEN SATURATION: 98 % | DIASTOLIC BLOOD PRESSURE: 73 MMHG | HEART RATE: 95 BPM | TEMPERATURE: 98.4 F | BODY MASS INDEX: 27.87 KG/M2 | RESPIRATION RATE: 17 BRPM | WEIGHT: 186 LBS | SYSTOLIC BLOOD PRESSURE: 153 MMHG

## 2019-11-20 DIAGNOSIS — B35.6 TINEA CRURIS: ICD-10-CM

## 2019-11-20 DIAGNOSIS — L30.9 DERMATITIS: Primary | ICD-10-CM

## 2019-11-20 PROCEDURE — 99282 EMERGENCY DEPT VISIT SF MDM: CPT

## 2019-11-20 PROCEDURE — 99283 EMERGENCY DEPT VISIT LOW MDM: CPT | Performed by: PHYSICIAN ASSISTANT

## 2019-11-20 RX ORDER — DIPHENHYDRAMINE HCL 25 MG
50 TABLET ORAL EVERY 6 HOURS PRN
Qty: 20 TABLET | Refills: 0 | Status: SHIPPED | OUTPATIENT
Start: 2019-11-20 | End: 2020-02-08

## 2019-11-20 RX ORDER — CLOTRIMAZOLE 1 %
CREAM (GRAM) TOPICAL
Qty: 40 G | Refills: 0 | Status: SHIPPED | OUTPATIENT
Start: 2019-11-20 | End: 2020-03-31

## 2019-11-20 NOTE — DISCHARGE INSTRUCTIONS
Jock Itch   WHAT YOU NEED TO KNOW:   Jock itch is a rash on your groin  The groin is the area between your abdomen and legs  Jock itch is usually easy to treat and prevent  It is caused by a fungus  The fungus also causes athlete's foot  DISCHARGE INSTRUCTIONS:   Medicines:   · Fungus medicine:  Jock itch is usually treated with a cream that kills the fungus  Apply the cream to the rash and the skin around it as directed  You may need to apply the cream 1 to 2 times each day for 2 weeks  You may be given this medicine as a pill if the cream does not help  · Take your medicine as directed  Contact your healthcare provider if you think your medicine is not helping or if you have side effects  Tell him or her if you are allergic to any medicine  Keep a list of the medicines, vitamins, and herbs you take  Include the amounts, and when and why you take them  Bring the list or the pill bottles to follow-up visits  Carry your medicine list with you in case of an emergency  Manage and prevent jock itch:   · Keep the area dry  · Wear light, loose clothes  Do not share clothes  · Do not wear wet clothes for long periods  Wash athletic gear after you play sports  · Bathe daily  Dry your skin completely after you bathe  Apply cream or powder after you bathe as directed if you get jock itch often  Wash your hands often to prevent the spread of the fungus  You may want to wear disposable gloves when you clean your feet  The gloves will keep the fungus from moving from your feet to your hands  · Use separate towels to dry each part of your body  Put your socks on before you put on your underwear so you do not spread the fungus from your feet to your groin  · Lose weight if you weigh more than your healthcare provider suggests  Follow up with your healthcare provider or skin specialist as directed: Your healthcare provider will examine your rash to see how well it is healing   If you take medicine as a pill, he may draw blood to check how your liver and kidneys are working  Write down your questions so you remember to ask them during your visits  Contact your healthcare provider or skin specialist if:   · Your signs and symptoms do not get better within 2 weeks of treatment  · Your signs and symptoms get worse or come back after treatment  · You get a rash on a part of your body other than your groin  · You have a fever  · You have questions or concerns about your condition or care  © 2017 [employer]  Information is for End User's use only and may not be sold, redistributed or otherwise used for commercial purposes  All illustrations and images included in CareNotes® are the copyrighted property of [employer]  or [employer]  The above information is an  only  It is not intended as medical advice for individual conditions or treatments  Talk to your doctor, nurse or pharmacist before following any medical regimen to see if it is safe and effective for you  Dermatitis   WHAT YOU NEED TO KNOW:   Dermatitis is skin inflammation  You may have an itchy rash, redness, or swelling  You may also have bumps or blisters that crust over or ooze clear fluid  Dermatitis can be caused by allergens such as dust mites, pet dander, pollen, and certain foods  It can also develop when something touches your skin and irritates it or causes an allergic reaction  Examples include soaps, chemicals, latex, and poison ivy  DISCHARGE INSTRUCTIONS:   Call 911 if you have any of the following symptoms of anaphylaxis:   · Sudden trouble breathing    · Throat swelling and tightness    · Dizziness, lightheadedness, fainting, or confusion  Return to the emergency department if:   · You develop a fever or have red streaks going up your arm or leg  · Your rash gets more swollen, red, or hot    Contact your healthcare provider if:   · Your skin blisters, oozes white or yellow pus, or has a foul-smelling discharge  · Your rash spreads or does not get better, even after treatment  · You have questions or concerns about your condition or care  Medicines:   · Medicines  help decrease itching and inflammation, or treat a bacterial infection  They may be given as a topical cream, shot, or a pill  · Take your medicine as directed  Contact your healthcare provider if you think your medicine is not helping or if you have side effects  Tell him of her if you are allergic to any medicine  Keep a list of the medicines, vitamins, and herbs you take  Include the amounts, and when and why you take them  Bring the list or the pill bottles to follow-up visits  Carry your medicine list with you in case of an emergency  Apply a cool compress to your rash: This will help soothe your skin  Keep your skin moist:  Rub unscented cream or lotion on your skin to prevent dryness and itching  Do this right after a lukewarm bath or shower when your skin is still damp  Avoid skin irritants:  Do not use skin irritants, such as makeup, hair products, soaps, and cleansers  Use products that do not contain fragrances or dye  Follow up with your healthcare provider as directed:  Write down your questions so you remember to ask them during your visits  © 2017 [employer] Information is for End User's use only and may not be sold, redistributed or otherwise used for commercial purposes  All illustrations and images included in CareNotes® are the copyrighted property of [employer]  or [employer]  The above information is an  only  It is not intended as medical advice for individual conditions or treatments  Talk to your doctor, nurse or pharmacist before following any medical regimen to see if it is safe and effective for you

## 2019-11-20 NOTE — ED PROVIDER NOTES
History  Chief Complaint   Patient presents with    Rash     c/o itchy rash on B/L inner thighs "after I used a public restroom last night"  Pt took Benadryl and applied Camaline lotion at 0700     Christelle Tran is a 62 y o  male with a past medical history of asthma who presents to the ED with complaints of bilateral upper thigh/groin x 2 days  Patient has a previous history of tinea cruris  Patient states he used a public restroom last night and is concerned that he was "infected by something  "Patient states he took one 25 milligrams Benadryl at applied calamine lotion this morning with some alleviation of itching  Patient states he was just visiting [place]  Denies new detergents/soaps/medications/foods, sick contacts, insect/bug bites, urinary frequency, urinary urgency, hematuria, dysuria, testicular pain, testicular swelling, abdominal pain, nausea, vomiting, diarrhea, chest pain, shortness of breath, fever, chills  History provided by:  Patient  Rash   Location:  Pelvis and leg  Pelvic rash location:  Groin  Leg rash location:  L upper leg and R upper leg  Quality: dryness, itchiness and redness    Duration:  2 days  Context: not exposure to similar rash, not hot tub use, not insect bite/sting, not new detergent/soap and not sick contacts    Ineffective treatments:  Antihistamines and anti-itch cream  Associated symptoms: no abdominal pain, no diarrhea, no fatigue, no fever, no headaches, no hoarse voice, no induration, no joint pain, no myalgias, no nausea, no periorbital edema, no shortness of breath, no sore throat, no throat swelling, no tongue swelling, no URI, not vomiting and not wheezing        Prior to Admission Medications   Prescriptions Last Dose Informant Patient Reported? Taking?    [employer] ALLERGY 25 MG capsule   No No   Sig: Take 1 capsule (25 mg total) by mouth daily at bedtime   EPINEPHrine (EPIPEN 2-ARUN) 0 3 mg/0 3 mL SOAJ   No No   Sig: Use in case of anaphylaxis reaction Menthol, Topical Analgesic, (Flexall 454) 16 % GEL   Yes No   Sig: Apply topically     albuterol (PROAIR HFA) 90 mcg/act inhaler   No No   Sig: Inhale 2 puffs every 6 (six) hours as needed for wheezing   clonazePAM (KlonoPIN) 0 5 mg tablet   Yes No   Sig: Take by mouth 4 (four) times a day as needed   clotrimazole (LOTRIMIN) 1 % cream   No No   Sig: Apply to affected area 2 times daily   clotrimazole (LOTRIMIN) 1 % cream   No No   Sig: Apply to affected area 2 times daily   clotrimazole (LOTRIMIN) 1 % cream   No No   Sig: Apply to affected area 2 times daily   clotrimazole-betamethasone (LOTRISONE) 1-0 05 % cream   No No   Sig: Apply 1 application topically 2 (two) times a day To affected area   cyclobenzaprine (FLEXERIL) 10 mg tablet   Yes No   Sig: Take 10 mg by mouth 3 (three) times a day as needed for muscle spasms   diphenhydrAMINE (BENADRYL) 25 mg capsule   No No   Sig: Take 1 capsule (25 mg total) by mouth every 6 (six) hours as needed for itching   diphenhydrAMINE (BENADRYL) 25 mg tablet   No No   Sig: Take 1 tablet (25 mg total) by mouth every 6 (six) hours As needed  for hives and itching   fluticasone (FLONASE) 50 mcg/act nasal spray   No No   Sig: USED 2 SPRAY EACH NOSTRIL ONCE A DAY   ibuprofen (MOTRIN) 600 mg tablet   Yes No   Sig: Take by mouth   lidocaine (LIDODERM) 5 %   Yes No   Sig: Place 1 patch on the skin every 24 hours Remove & Discard patch within 12 hours or as directed by MD   olopatadine (PATANOL) 0 1 % ophthalmic solution   No No   Sig: Administer 1 drop to both eyes 2 (two) times a day   triamcinolone (KENALOG) 0 5 % ointment   No No   Sig: Apply topically 2 (two) times a day   zolpidem (AMBIEN) 10 mg tablet   Yes No      Facility-Administered Medications: None       Past Medical History:   Diagnosis Date    Allergic rhinitis 01/13/2011    Anxiety     Asthma     Insomnia     Kidney stone     Myalgia and Myositis 11/13/2007    Pain     chronic neck,back, spine    Psychiatric disorder     Temporomandibular joint disorder 01/06/2009       Past Surgical History:   Procedure Laterality Date    KIDNEY STONE SURGERY      pt poor historian       Family History   Problem Relation Age of Onset    No Known Problems Mother      I have reviewed and agree with the history as documented  Social History     Tobacco Use    Smoking status: Never Smoker    Smokeless tobacco: Never Used   Substance Use Topics    Alcohol use: No    Drug use: No        Review of Systems   Constitutional: Negative for appetite change, chills, fatigue, fever and unexpected weight change  HENT: Negative for congestion, drooling, ear pain, hoarse voice, rhinorrhea, sore throat, trouble swallowing and voice change  Eyes: Negative for pain, discharge, redness and visual disturbance  Respiratory: Negative for cough, shortness of breath, wheezing and stridor  Cardiovascular: Negative for chest pain, palpitations and leg swelling  Gastrointestinal: Negative for abdominal pain, blood in stool, constipation, diarrhea, nausea and vomiting  Genitourinary: Negative for dysuria, flank pain, frequency, hematuria and urgency  Musculoskeletal: Negative for arthralgias, gait problem, joint swelling, myalgias, neck pain and neck stiffness  Skin: Positive for rash  Negative for color change  Neurological: Negative for dizziness, seizures, light-headedness and headaches  Physical Exam  Physical Exam   Constitutional: He is oriented to person, place, and time  He appears well-developed and well-nourished  HENT:   Head: Normocephalic and atraumatic  Nose: Nose normal    Mouth/Throat: Oropharynx is clear and moist    Eyes: Pupils are equal, round, and reactive to light  Conjunctivae and EOM are normal    Neck: Normal range of motion  Neck supple  Cardiovascular: Normal rate, regular rhythm and intact distal pulses     Pulmonary/Chest: Effort normal and breath sounds normal    Musculoskeletal: Normal range of motion  Neurological: He is alert and oriented to person, place, and time  Skin: Skin is warm and dry  Capillary refill takes less than 2 seconds  Rash noted  Erythematous patch like rash on the inner thighs/groin  Dry flaking skin  No vesicles  Nursing note and vitals reviewed  Vital Signs  ED Triage Vitals [11/20/19 1045]   Temperature Pulse Respirations Blood Pressure SpO2   98 4 °F (36 9 °C) 95 17 153/73 98 %      Temp Source Heart Rate Source Patient Position - Orthostatic VS BP Location FiO2 (%)   Oral Monitor Sitting Left arm --      Pain Score       No Pain           Vitals:    11/20/19 1045   BP: 153/73   Pulse: 95   Patient Position - Orthostatic VS: Sitting         Visual Acuity      ED Medications  Medications - No data to display    Diagnostic Studies  Results Reviewed     None                 No orders to display              Procedures  Procedures       ED Course  ED Course as of Nov 20 1256   Wed Nov 20, 2019   1117 Educated patient regarding diagnosis and management  Advised patient to follow up with PCP  Advised patient to RTER for persistent or worsening symptoms  MDM  Number of Diagnoses or Management Options  Dermatitis: new and does not require workup  Tinea cruris: new and does not require workup  Diagnosis management comments: Erma Fernandez is a 62 y o  male with a past medical history of asthma who presents to the ED with complaints of bilateral upper thigh/groin x 2 days  Patient has a previous history of tinea cruris  Patient states he used a public restroom last night and is concerned that he was "infected by something  "Patient states he took one 25 milligrams Benadryl at applied calamine lotion this morning with some alleviation of itching  Patient states he was just visiting [place]   Denies new detergents/soaps/medications/foods, sick contacts, insect/bug bites, urinary frequency, urinary urgency, hematuria, dysuria, testicular pain, testicular swelling, abdominal pain, nausea, vomiting, diarrhea, chest pain, shortness of breath, fever, chills  Patient was instructed to keep the area clean and dry pain to begin using antifungal ointment  Given recurrent history of tinea cruris, advised the patient follow up with outpatient dermatology  I provided patient with strict RTER precautions  I advised patient follow-up with PCP in 24-48 hours  Patient verbalized understanding  Amount and/or Complexity of Data Reviewed  Review and summarize past medical records: yes    Risk of Complications, Morbidity, and/or Mortality  Presenting problems: low  Diagnostic procedures: low  Management options: low    Patient Progress  Patient progress: improved      Disposition  Final diagnoses:   Tinea cruris   Dermatitis     Time reflects when diagnosis was documented in both MDM as applicable and the Disposition within this note     Time User Action Codes Description Comment    11/20/2019 11:12 AM Sergo Sharps Add [B35 6] Tinea cruris     11/20/2019 11:13 AM Sergo Sharps Modify [B35 6] Tinea cruris     11/20/2019 11:15 AM Sergo Sharps Modify [B35 6] Tinea cruris     11/20/2019 11:15 AM Sergo Sharps Add [L30 9] Dermatitis       ED Disposition     ED Disposition Condition Date/Time Comment    Discharge Stable Wed Nov 20, 2019 11:15 AM Khang Osborne discharge to home/self care              Follow-up Information     Follow up With Specialties Details Why Contact Info Additional [address] Emergency Department Emergency Medicine Go to  If symptoms worsen [address] [facility] [address] [phone] AN ED, [address] [facility], [address] [place] [address], [address]    Sagar Werner MD Family Medicine Schedule an appointment as soon as possible for a visit   [address] [facility],[address]  [facility] [address] [place] [address] Dermatology [place] [facility] Schedule an appointment as soon as possible for a visit   [address] [facility] [phone] [address] [facility] Dermatology [place], [address] [facility], Zulay Sainz, [address] [facility], [address] [place]          Discharge Medication List as of 11/20/2019 11:17 AM      START taking these medications    Details   !! diphenhydrAMINE (BENADRYL) 25 mg tablet Take 2 tablets (50 mg total) by mouth every 6 (six) hours as needed for itching, Starting Wed 11/20/2019, Normal       !! - Potential duplicate medications found  Please discuss with provider  CONTINUE these medications which have CHANGED    Details   !! clotrimazole (LOTRIMIN) 1 % cream Apply to affected area 2 times daily, Normal       !! - Potential duplicate medications found  Please discuss with provider        CONTINUE these medications which have NOT CHANGED    Details   albuterol (PROAIR HFA) 90 mcg/act inhaler Inhale 2 puffs every 6 (six) hours as needed for wheezing, Starting Mon 1/14/2019, Normal      clonazePAM (KlonoPIN) 0 5 mg tablet Take by mouth 4 (four) times a day as needed, Starting Mon 12/19/2011, Historical Med      !! clotrimazole (LOTRIMIN) 1 % cream Apply to affected area 2 times daily, Print      clotrimazole-betamethasone (LOTRISONE) 1-0 05 % cream Apply 1 application topically 2 (two) times a day To affected area, Starting Thu 3/28/2019, Normal      !! [employer]S ALLERGY 25 MG capsule Take 1 capsule (25 mg total) by mouth daily at bedtime, Starting Mon 2/26/2018, Normal      cyclobenzaprine (FLEXERIL) 10 mg tablet Take 10 mg by mouth 3 (three) times a day as needed for muscle spasms, Historical Med      !! diphenhydrAMINE (BENADRYL) 25 mg capsule Take 1 capsule (25 mg total) by mouth every 6 (six) hours as needed for itching, Starting Mon 1/14/2019, Normal      !! diphenhydrAMINE (BENADRYL) 25 mg tablet Take 1 tablet (25 mg total) by mouth every 6 (six) hours As needed  for hives and itching, Starting Fri 7/19/2019, Normal      EPINEPHrine (EPIPEN 2-ARUN) 0 3 mg/0 3 mL SOAJ Use in case of anaphylaxis reaction, Normal      fluticasone (FLONASE) 50 mcg/act nasal spray USED 2 SPRAY EACH NOSTRIL ONCE A DAY, Normal      ibuprofen (MOTRIN) 600 mg tablet Take by mouth, Starting Mon 10/23/2017, Historical Med      lidocaine (LIDODERM) 5 % Place 1 patch on the skin every 24 hours Remove & Discard patch within 12 hours or as directed by MD, Until Discontinued, Historical Med      Menthol, Topical Analgesic, (Flexall 454) 16 % GEL Apply topically  , Until Discontinued, Historical Med      olopatadine (PATANOL) 0 1 % ophthalmic solution Administer 1 drop to both eyes 2 (two) times a day, Starting Sun 6/9/2019, Print      triamcinolone (KENALOG) 0 5 % ointment Apply topically 2 (two) times a day, Starting Tue 3/19/2019, Normal      zolpidem (AMBIEN) 10 mg tablet Starting Sun 2/25/2018, Historical Med       !! - Potential duplicate medications found  Please discuss with provider  No discharge procedures on file      ED Provider  Electronically Signed by           Carmelo Jeffers PA-C  11/20/19 6517

## 2019-12-16 DIAGNOSIS — T78.40XS ALLERGIC DISORDER, SEQUELA: ICD-10-CM

## 2019-12-16 RX ORDER — FLUTICASONE PROPIONATE 50 MCG
SPRAY, SUSPENSION (ML) NASAL
Qty: 48 ML | Refills: 1 | Status: SHIPPED | OUTPATIENT
Start: 2019-12-16 | End: 2020-04-27

## 2020-01-10 ENCOUNTER — TELEPHONE (OUTPATIENT)
Dept: FAMILY MEDICINE CLINIC | Facility: CLINIC | Age: 65
End: 2020-01-10

## 2020-02-08 ENCOUNTER — HOSPITAL ENCOUNTER (EMERGENCY)
Facility: HOSPITAL | Age: 65
Discharge: HOME | End: 2020-02-08
Attending: INTERNAL MEDICINE
Payer: MEDICARE

## 2020-02-08 VITALS
SYSTOLIC BLOOD PRESSURE: 201 MMHG | OXYGEN SATURATION: 98 % | HEART RATE: 122 BPM | TEMPERATURE: 97.4 F | DIASTOLIC BLOOD PRESSURE: 118 MMHG | BODY MASS INDEX: 27.95 KG/M2 | WEIGHT: 186.51 LBS | RESPIRATION RATE: 19 BRPM

## 2020-02-08 DIAGNOSIS — L50.9 URTICARIA: Primary | ICD-10-CM

## 2020-02-08 PROCEDURE — 99282 EMERGENCY DEPT VISIT SF MDM: CPT

## 2020-02-08 PROCEDURE — 99284 EMERGENCY DEPT VISIT MOD MDM: CPT | Performed by: PHYSICIAN ASSISTANT

## 2020-02-08 RX ORDER — PREDNISONE 50 MG/1
50 TABLET ORAL DAILY
Qty: 5 TABLET | Refills: 0 | Status: SHIPPED | OUTPATIENT
Start: 2020-02-08 | End: 2020-02-13

## 2020-02-08 RX ORDER — CETIRIZINE HYDROCHLORIDE 10 MG/1
10 TABLET ORAL DAILY
Qty: 5 TABLET | Refills: 0 | Status: SHIPPED | OUTPATIENT
Start: 2020-02-08 | End: 2020-03-31

## 2020-02-08 RX ORDER — DIPHENHYDRAMINE HCL 25 MG
25 TABLET ORAL EVERY 6 HOURS
Qty: 20 TABLET | Refills: 0 | Status: SHIPPED | OUTPATIENT
Start: 2020-02-08 | End: 2020-03-31

## 2020-02-08 NOTE — ED PROVIDER NOTES
History  Chief Complaint   Patient presents with    Rash     c/o itchy rash "near my private area" since last night  Pt took Benadryl early "this morning"  Pt seen multiple times for similar complaint  History provided by:  Patient  Rash   Location: groin  Quality: itchiness and redness    Severity:  Mild  Onset quality:  Gradual  Duration:  2 days  Timing:  Constant  Progression:  Improving  Chronicity:  Recurrent  Context: hot tub use    Context: not animal contact, not chemical exposure, not diapers, not eggs, not exposure to similar rash, not food, not insect bite/sting, not medications, not new detergent/soap, not nuts, not plant contact, not pollen, not pregnancy, not sick contacts and not sun exposure    Relieved by: Benadryl  Worsened by:  Contact and heat  Associated symptoms: no diarrhea, no fatigue, no fever, no headaches, no hoarse voice, no induration, no periorbital edema, no shortness of breath, no sore throat, no throat swelling, no tongue swelling, not vomiting and not wheezing        Prior to Admission Medications   Prescriptions Last Dose Informant Patient Reported? Taking? [facility]S ALLERGY 25 MG capsule   No No   Sig: Take 1 capsule (25 mg total) by mouth daily at bedtime   EPINEPHrine (EPIPEN 2-ARUN) 0 3 mg/0 3 mL SOAJ   No No   Sig: Use in case of anaphylaxis reaction   Menthol, Topical Analgesic, (Flexall 454) 16 % GEL   Yes No   Sig: Apply topically     albuterol (PROAIR HFA) 90 mcg/act inhaler   No No   Sig: Inhale 2 puffs every 6 (six) hours as needed for wheezing   clonazePAM (KlonoPIN) 0 5 mg tablet   Yes No   Sig: Take by mouth 4 (four) times a day as needed   clotrimazole (LOTRIMIN) 1 % cream   No No   Sig: Apply to affected area 2 times daily   clotrimazole (LOTRIMIN) 1 % cream   No No   Sig: Apply to affected area 2 times daily   clotrimazole-betamethasone (LOTRISONE) 1-0 05 % cream   No No   Sig: Apply 1 application topically 2 (two) times a day To affected area   cyclobenzaprine (FLEXERIL) 10 mg tablet   Yes No   Sig: Take 10 mg by mouth 3 (three) times a day as needed for muscle spasms   diphenhydrAMINE (BENADRYL) 25 mg capsule   No No   Sig: Take 1 capsule (25 mg total) by mouth every 6 (six) hours as needed for itching   diphenhydrAMINE (BENADRYL) 25 mg tablet   No No   Sig: Take 1 tablet (25 mg total) by mouth every 6 (six) hours As needed  for hives and itching   diphenhydrAMINE (BENADRYL) 25 mg tablet   No No   Sig: Take 2 tablets (50 mg total) by mouth every 6 (six) hours as needed for itching   fluticasone (FLONASE) 50 mcg/act nasal spray   No No   Sig: USED 2 SPRAY EACH NOSTRIL ONCE A DAY   ibuprofen (MOTRIN) 600 mg tablet   Yes No   Sig: Take by mouth   lidocaine (LIDODERM) 5 %   Yes No   Sig: Place 1 patch on the skin every 24 hours Remove & Discard patch within 12 hours or as directed by MD   olopatadine (PATANOL) 0 1 % ophthalmic solution   No No   Sig: Administer 1 drop to both eyes 2 (two) times a day   triamcinolone (KENALOG) 0 5 % ointment   No No   Sig: Apply topically 2 (two) times a day   zolpidem (AMBIEN) 10 mg tablet   Yes No      Facility-Administered Medications: None       Past Medical History:   Diagnosis Date    Allergic rhinitis 01/13/2011    Anxiety     Asthma     Insomnia     Kidney stone     Myalgia and Myositis 11/13/2007    Pain     chronic neck,back, spine    Psychiatric disorder     Temporomandibular joint disorder 01/06/2009       Past Surgical History:   Procedure Laterality Date    KIDNEY STONE SURGERY      pt poor historian       Family History   Problem Relation Age of Onset    No Known Problems Mother      I have reviewed and agree with the history as documented  Social History     Tobacco Use    Smoking status: Never Smoker    Smokeless tobacco: Never Used   Substance Use Topics    Alcohol use: No    Drug use: No        Review of Systems   Constitutional: Negative for activity change, appetite change, chills, fatigue and fever  HENT: Negative for hoarse voice and sore throat  Respiratory: Negative for shortness of breath and wheezing  Gastrointestinal: Negative for diarrhea and vomiting  Skin: Positive for color change and rash  Neurological: Negative for headaches  All other systems reviewed and are negative  Physical Exam  Physical Exam   Constitutional: He is oriented to person, place, and time  He appears well-developed and well-nourished  No distress  HENT:   Head: Normocephalic and atraumatic  Right Ear: External ear normal    Left Ear: External ear normal    Nose: Nose normal    Mouth/Throat: Oropharynx is clear and moist    Eyes: Conjunctivae are normal  Right eye exhibits no discharge  Left eye exhibits no discharge  Neck: Neck supple  Cardiovascular: Normal rate, regular rhythm and normal heart sounds  Pulmonary/Chest: Effort normal and breath sounds normal  No stridor  No respiratory distress  He has no wheezes  He has no rales  Lymphadenopathy:     He has no cervical adenopathy  Neurological: He is alert and oriented to person, place, and time  Skin: Skin is warm  Capillary refill takes less than 2 seconds  He is not diaphoretic  Psychiatric: He has a normal mood and affect  His behavior is normal  Judgment and thought content normal    Nursing note and vitals reviewed        Vital Signs  ED Triage Vitals [02/08/20 1035]   Temperature Pulse Respirations Blood Pressure SpO2   (!) 97 4 °F (36 3 °C) (!) 122 19 (!) 201/118 98 %      Temp Source Heart Rate Source Patient Position - Orthostatic VS BP Location FiO2 (%)   Oral Monitor Lying Right arm --      Pain Score       --           Vitals:    02/08/20 1035   BP: (!) 201/118   Pulse: (!) 122   Patient Position - Orthostatic VS: Lying         Visual Acuity      ED Medications  Medications - No data to display    Diagnostic Studies  Results Reviewed     None                 No orders to display              Procedures  Procedures         ED Course  ED Course as of Feb 08 1717   Sat Feb 08, 2020   1105 Repeat /74                                  MDM  Number of Diagnoses or Management Options  Urticaria: new and does not require workup  Risk of Complications, Morbidity, and/or Mortality  Presenting problems: low  Diagnostic procedures: low  Management options: low  General comments: Patient presents to the emergency room with complaints of an itchy rash on his genital area  He was seen and examined  He was diagnosed with acute urticaria  He was given a prescription for prednisone, Benadryl, Zyrtec  Was given reasons to return to the emergency room should his symptoms worsen  He was discharged home  Patient Progress  Patient progress: stable        Disposition  Final diagnoses:   Urticaria     Time reflects when diagnosis was documented in both MDM as applicable and the Disposition within this note     Time User Action Codes Description Comment    2/8/2020 11:05 AM Eleanor Hollins Add [L50 9] Urticaria       ED Disposition     ED Disposition Condition Date/Time Comment    Discharge Stable Sat Feb 8, 2020 11:05 AM Deborah Johnson discharge to home/self care              Follow-up Information     Follow up With Specialties Details Why Contact Info    Yris Farnsworth MD Family Medicine  As needed if symptoms persist [address] [facility] [address]  [phone]            Discharge Medication List as of 2/8/2020 11:10 AM      START taking these medications    Details   cetirizine (ZyrTEC) 10 mg tablet Take 1 tablet (10 mg total) by mouth daily for 5 days, Starting Sat 2/8/2020, Until u 2/13/2020, Normal      predniSONE 50 mg tablet Take 1 tablet (50 mg total) by mouth daily for 5 days, Starting Sat 2/8/2020, Until Thu 2/13/2020, Normal         CONTINUE these medications which have CHANGED    Details   diphenhydrAMINE (BENADRYL) 25 mg tablet Take 1 tablet (25 mg total) by mouth every 6 (six) hours for 20 doses As needed for breakthrough itching, Starting Sat 2/8/2020, Until Thu 2/13/2020, Normal         CONTINUE these medications which have NOT CHANGED    Details   albuterol (PROAIR HFA) 90 mcg/act inhaler Inhale 2 puffs every 6 (six) hours as needed for wheezing, Starting Mon 1/14/2019, Normal      clonazePAM (KlonoPIN) 0 5 mg tablet Take by mouth 4 (four) times a day as needed, Starting Mon 12/19/2011, Historical Med      !! clotrimazole (LOTRIMIN) 1 % cream Apply to affected area 2 times daily, Print      !! clotrimazole (LOTRIMIN) 1 % cream Apply to affected area 2 times daily, Normal      clotrimazole-betamethasone (LOTRISONE) 1-0 05 % cream Apply 1 application topically 2 (two) times a day To affected area, Starting Thu 3/28/2019, Normal      [employer]S ALLERGY 25 MG capsule Take 1 capsule (25 mg total) by mouth daily at bedtime, Starting Mon 2/26/2018, Normal      cyclobenzaprine (FLEXERIL) 10 mg tablet Take 10 mg by mouth 3 (three) times a day as needed for muscle spasms, Historical Med      EPINEPHrine (EPIPEN 2-ARUN) 0 3 mg/0 3 mL SOAJ Use in case of anaphylaxis reaction, Normal      fluticasone (FLONASE) 50 mcg/act nasal spray USED 2 SPRAY EACH NOSTRIL ONCE A DAY, Normal      ibuprofen (MOTRIN) 600 mg tablet Take by mouth, Starting Mon 10/23/2017, Historical Med      lidocaine (LIDODERM) 5 % Place 1 patch on the skin every 24 hours Remove & Discard patch within 12 hours or as directed by MD, Until Discontinued, Historical Med      Menthol, Topical Analgesic, (Flexall 454) 16 % GEL Apply topically  , Until Discontinued, Historical Med      olopatadine (PATANOL) 0 1 % ophthalmic solution Administer 1 drop to both eyes 2 (two) times a day, Starting Sun 6/9/2019, Print      triamcinolone (KENALOG) 0 5 % ointment Apply topically 2 (two) times a day, Starting Tue 3/19/2019, Normal      zolpidem (AMBIEN) 10 mg tablet Starting Sun 2/25/2018, Historical Med       !! - Potential duplicate medications found  Please discuss with provider          No discharge procedures on file      ED Provider  Electronically Signed by           Daiana Dougherty PA-C  02/08/20 1250

## 2020-03-02 ENCOUNTER — HOSPITAL ENCOUNTER (EMERGENCY)
Facility: HOSPITAL | Age: 65
Discharge: HOME | End: 2020-03-02
Attending: EMERGENCY MEDICINE
Payer: MEDICARE

## 2020-03-02 VITALS
WEIGHT: 189.6 LBS | RESPIRATION RATE: 16 BRPM | HEART RATE: 83 BPM | SYSTOLIC BLOOD PRESSURE: 139 MMHG | TEMPERATURE: 98.9 F | BODY MASS INDEX: 28.08 KG/M2 | DIASTOLIC BLOOD PRESSURE: 87 MMHG | OXYGEN SATURATION: 99 % | HEIGHT: 69 IN

## 2020-03-02 DIAGNOSIS — L50.9 URTICARIA: Primary | ICD-10-CM

## 2020-03-02 DIAGNOSIS — R21 RASH: ICD-10-CM

## 2020-03-02 PROCEDURE — 99282 EMERGENCY DEPT VISIT SF MDM: CPT

## 2020-03-02 PROCEDURE — 99283 EMERGENCY DEPT VISIT LOW MDM: CPT | Performed by: PHYSICIAN ASSISTANT

## 2020-03-02 RX ORDER — DIPHENHYDRAMINE HCL 25 MG
25 TABLET ORAL EVERY 6 HOURS PRN
Qty: 30 TABLET | Refills: 0 | Status: SHIPPED | OUTPATIENT
Start: 2020-03-02

## 2020-03-02 RX ORDER — CLOTRIMAZOLE AND BETAMETHASONE DIPROPIONATE 10; .64 MG/G; MG/G
CREAM TOPICAL
Qty: 45 G | Refills: 0 | Status: SHIPPED | OUTPATIENT
Start: 2020-03-02 | End: 2020-03-31 | Stop reason: ALTCHOICE

## 2020-03-02 RX ORDER — PREDNISONE 10 MG/1
10 TABLET ORAL
Qty: 42 TABLET | Refills: 0 | Status: SHIPPED | OUTPATIENT
Start: 2020-03-02 | End: 2020-03-31

## 2020-03-02 RX ORDER — CETIRIZINE HYDROCHLORIDE 10 MG/1
10 TABLET ORAL DAILY PRN
Qty: 30 TABLET | Refills: 0 | Status: SHIPPED | OUTPATIENT
Start: 2020-03-02 | End: 2022-04-25

## 2020-03-02 NOTE — DISCHARGE INSTRUCTIONS
Urticaria   WHAT YOU NEED TO KNOW:   Urticaria is also called hives  Hives can change size and shape, and appear anywhere on your skin  They can be mild or severe and last from a few minutes to a few days  Hives may be a sign of a severe allergic reaction called anaphylaxis that needs immediate treatment  Urticaria that lasts longer than 6 weeks may be a chronic condition that needs long-term treatment  DISCHARGE INSTRUCTIONS:   Call 911 for signs or symptoms of anaphylaxis,  such as trouble breathing, swelling in your mouth or throat, or wheezing  You may also have itching, a rash, or feel like you are going to faint  Return to the emergency department if:   · Your heart is beating faster than it normally does  · You have cramping or severe pain in your abdomen  Contact your healthcare provider if:   · You have a fever  · Your skin still itches 24 hours after you take your medicine  · You still have hives after 7 days  · Your joints are painful and swollen  · You have questions or concerns about your condition or care  Medicines:   · Epinephrine  is used to treat severe allergic reactions such as anaphylaxis  · Antihistamines  decrease mild symptoms such as itching or a rash  · Steroids  decrease redness, pain, and swelling  · Take your medicine as directed  Contact your healthcare provider if you think your medicine is not helping or if you have side effects  Tell him of her if you are allergic to any medicine  Keep a list of the medicines, vitamins, and herbs you take  Include the amounts, and when and why you take them  Bring the list or the pill bottles to follow-up visits  Carry your medicine list with you in case of an emergency  Steps to take for signs or symptoms of anaphylaxis:   · Immediately  give 1 shot of epinephrine only into the outer thigh muscle  · Leave the shot in place  as directed   Your healthcare provider may recommend you leave it in place for up to 10 seconds before you remove it  This helps make sure all of the epinephrine is delivered  · Call 911 and go to the emergency department,  even if the shot improved symptoms  Do not drive yourself  Bring the used epinephrine shot with you  Safety precautions to take if you are at risk for anaphylaxis:   · Keep 2 shots of epinephrine with you at all times  You may need a second shot, because epinephrine only works for about 20 minutes and symptoms may return  Your healthcare provider can show you and family members how to give the shot  Check the expiration date every month and replace it before it expires  · Create an action plan  Your healthcare provider can help you create a written plan that explains the allergy and an emergency plan to treat a reaction  The plan explains when to give a second epinephrine shot if symptoms return or do not improve after the first  Give copies of the action plan and emergency instructions to family members, work and school staff, and  providers  Show them how to give a shot of epinephrine  · Be careful when you exercise  If you have had exercise-induced anaphylaxis, do not exercise right after you eat  Stop exercising right away if you start to develop any signs or symptoms of anaphylaxis  You may first feel tired, warm, or have itchy skin  Hives, swelling, and severe breathing problems may develop if you continue to exercise  · Carry medical alert identification  Wear medical alert jewelry or carry a card that explains the allergy  Ask your healthcare provider where to get these items  · Keep a record of triggers and symptoms  Record everything you eat, drink, or apply to your skin for 3 weeks  Include stressful events and what you were doing right before your hives started  Bring the record with you to follow-up visits with your healthcare provider  Manage urticaria:   · Cool your skin  This may help decrease itching   Apply a cool pack to your hives  Dip a hand towel in cool water, wring it out, and place it on your hives  You may also soak your skin in a cool oatmeal bath  · Do not rub your hives  This can irritate your skin and cause more hives  · Wear loose clothing  Tight clothes may irritate your skin and cause more hives  · Manage stress  Stress may trigger hives, or make them worse  Learn new ways to relax, such as deep breathing  Follow up with your healthcare provider as directed:  Write down your questions so you remember to ask them during your visits  © 2017 [address] [employer] Information is for End User's use only and may not be sold, redistributed or otherwise used for commercial purposes  All illustrations and images included in CareNotes® are the copyrighted property of A D A M [employer]  or Cyril Chong  The above information is an  only  It is not intended as medical advice for individual conditions or treatments  Talk to your doctor, nurse or pharmacist before following any medical regimen to see if it is safe and effective for you

## 2020-03-02 NOTE — ED PROVIDER NOTES
History  Chief Complaint   Patient presents with    Urticaria     Pt presents to the ED with hives on his arms and legs  Pt was seen here recently for an allergic reaction, given meds  Told to come back if meds were not working  Deborah Johnson is a 62 y o  male who presents to the ED with complaints of pruritic rash  Patient was seen in the ED on 02/08/2020 during which time he was prescribed prednisone, zyrtec, and benadryl with improvement of symptoms  Patient was also seen for jock itch and given a prescription cream which assisted with symptoms  Patient is concerned he may have been exposed to cleaning chemicals from a public restroom  No OTC medications taken PTA  Patient has been seen in the emergency room on 4 separate occasions since July for similar complaints  Patient has been referred to Dermatology and Allergy which she has not followed up with  History provided by:  Patient  Rash   Quality: dryness, itchiness and redness    Context: chemical exposure (possible)    Context: not exposure to similar rash, not insect bite/sting, not new detergent/soap and not sick contacts    Associated symptoms: no abdominal pain, no diarrhea, no fatigue, no fever, no headaches, no hoarse voice, no induration, no joint pain, no myalgias, no nausea, no periorbital edema, no shortness of breath, no sore throat, no throat swelling, no tongue swelling, no URI, not vomiting and not wheezing        Prior to Admission Medications   Prescriptions Last Dose Informant Patient Reported? Taking? [facility] ALL[facility] 25 MG capsule   No No   Sig: Take 1 capsule (25 mg total) by mouth daily at bedtime   EPINEPHrine (EPIPEN 2-ARUN) 0 3 mg/0 3 mL SOAJ   No No   Sig: Use in case of anaphylaxis reaction   Menthol, Topical Analgesic, (Flexall 454) 16 % GEL   Yes No   Sig: Apply topically     albuterol (PROAIR HFA) 90 mcg/act inhaler   No No   Sig: Inhale 2 puffs every 6 (six) hours as needed for wheezing   cetirizine (ZyrTEC) 10 mg tablet No No   Sig: Take 1 tablet (10 mg total) by mouth daily for 5 days   clonazePAM (KlonoPIN) 0 5 mg tablet   Yes No   Sig: Take by mouth 4 (four) times a day as needed   clotrimazole (LOTRIMIN) 1 % cream   No No   Sig: Apply to affected area 2 times daily   clotrimazole (LOTRIMIN) 1 % cream   No No   Sig: Apply to affected area 2 times daily   clotrimazole-betamethasone (LOTRISONE) 1-0 05 % cream   No No   Sig: Apply 1 application topically 2 (two) times a day To affected area   cyclobenzaprine (FLEXERIL) 10 mg tablet   Yes No   Sig: Take 10 mg by mouth 3 (three) times a day as needed for muscle spasms   diphenhydrAMINE (BENADRYL) 25 mg tablet   No No   Sig: Take 1 tablet (25 mg total) by mouth every 6 (six) hours for 20 doses As needed for breakthrough itching   fluticasone (FLONASE) 50 mcg/act nasal spray   No No   Sig: USED 2 SPRAY EACH NOSTRIL ONCE A DAY   ibuprofen (MOTRIN) 600 mg tablet   Yes No   Sig: Take by mouth   lidocaine (LIDODERM) 5 %   Yes No   Sig: Place 1 patch on the skin every 24 hours Remove & Discard patch within 12 hours or as directed by MD   olopatadine (PATANOL) 0 1 % ophthalmic solution   No No   Sig: Administer 1 drop to both eyes 2 (two) times a day   tamsulosin (FLOMAX) 0 4 mg   Yes No   triamcinolone (KENALOG) 0 5 % ointment   No No   Sig: Apply topically 2 (two) times a day   zolpidem (AMBIEN) 10 mg tablet   Yes No      Facility-Administered Medications: None       Past Medical History:   Diagnosis Date    Allergic rhinitis 01/13/2011    Anxiety     Asthma     Insomnia     Kidney stone     Myalgia and Myositis 11/13/2007    Pain     chronic neck,back, spine    Psychiatric disorder     Temporomandibular joint disorder 01/06/2009       Past Surgical History:   Procedure Laterality Date    KIDNEY STONE SURGERY      pt poor historian       Family History   Problem Relation Age of Onset    No Known Problems Mother      I have reviewed and agree with the history as documented  E-Cigarette/Vaping     E-Cigarette/Vaping Substances     Social History     Tobacco Use    Smoking status: Never Smoker    Smokeless tobacco: Never Used   Substance Use Topics    Alcohol use: No    Drug use: No       Review of Systems   Constitutional: Negative for appetite change, chills, fatigue, fever and unexpected weight change  HENT: Negative for congestion, drooling, ear pain, hoarse voice, rhinorrhea, sore throat, trouble swallowing and voice change  Eyes: Negative for pain, discharge, redness and visual disturbance  Respiratory: Negative for cough, shortness of breath, wheezing and stridor  Cardiovascular: Negative for chest pain, palpitations and leg swelling  Gastrointestinal: Negative for abdominal pain, blood in stool, constipation, diarrhea, nausea and vomiting  Genitourinary: Negative for dysuria, flank pain, frequency, hematuria and urgency  Musculoskeletal: Negative for arthralgias, gait problem, joint swelling, myalgias, neck pain and neck stiffness  Skin: Positive for rash  Negative for color change  Neurological: Negative for dizziness, seizures, light-headedness and headaches  Physical Exam  Physical Exam   Constitutional: He is oriented to person, place, and time  He appears well-developed and well-nourished  HENT:   Head: Normocephalic and atraumatic  Nose: Nose normal    Mouth/Throat: Oropharynx is clear and moist    Eyes: Pupils are equal, round, and reactive to light  Conjunctivae and EOM are normal    Neck: Normal range of motion  Neck supple  Cardiovascular: Normal rate, regular rhythm and intact distal pulses  Pulmonary/Chest: Effort normal and breath sounds normal    Musculoskeletal: Normal range of motion  Neurological: He is alert and oriented to person, place, and time  Skin: Skin is warm and dry  Capillary refill takes less than 2 seconds  Rash noted  Erythematous dry skin to the inner aspect of the thighs   Macular rash to the upper arms  Nursing note and vitals reviewed  Vital Signs  ED Triage Vitals [03/02/20 1236]   Temperature Pulse Respirations Blood Pressure SpO2   98 9 °F (37 2 °C) 83 16 139/87 99 %      Temp Source Heart Rate Source Patient Position - Orthostatic VS BP Location FiO2 (%)   Oral Monitor -- Left arm --      Pain Score       --           Vitals:    03/02/20 1236   BP: 139/87   Pulse: 83         Visual Acuity      ED Medications  Medications - No data to display    Diagnostic Studies  Results Reviewed     None                 No orders to display              Procedures  Procedures         ED Course  ED Course as of Mar 02 1513   Mon Mar 02, 2020   1335 Educated patient regarding diagnosis and management  Advised patient to follow up with PCP  Advised patient to RTER for persistent or worsening symptoms  MDM  Number of Diagnoses or Management Options  Rash: new and does not require workup  Urticaria: new and does not require workup  Diagnosis management comments: Patient is requesting antihistamines and oral prednisone  I instructed patient to follow up with outpatient Dermatology and Allergy as this is his 3rd visit related to allergic dermatitis  I provided patient with strict RTER precautions  I advised patient follow-up with PCP in 24-48 hours  Patient verbalized understanding          Amount and/or Complexity of Data Reviewed  Review and summarize past medical records: yes    Patient Progress  Patient progress: improved        Disposition  Final diagnoses:   Urticaria   Rash     Time reflects when diagnosis was documented in both MDM as applicable and the Disposition within this note     Time User Action Codes Description Comment    3/2/2020  1:28 PM Karthik Heard Add [L50 9] Urticaria     3/2/2020  1:33 PM Maximino, [address] Rash       ED Disposition     ED Disposition Condition Date/Time Comment    Discharge Stable Mon Mar 2, 2020  1:28 PM Reyes Ibanez discharge to home/self care  Follow-up Information     Follow up With Specialties Details Why Contact Info Additional Information    [address] [facility] Emergency Medicine Go to  If symptoms worsen [address]  [phone] [facility], [address], [place], [place], [address]    Chilo Brothers MD Family Medicine Schedule an appointment as soon as possible for a visit   [address]  [place] [address] [address]  [phone]       Allergy Partnr  Allergy and Immunology Schedule an appointment as soon as possible for a visit   [address] [facility] [address] [facility] Dermatology [place] [facility] Schedule an appointment as soon as possible for a visit   [address] [facility] [address]  [phone] Formerly named [facility] Dermatology [place], [facility] [address] [place], [place], [address] [place]          Discharge Medication List as of 3/2/2020  1:35 PM      START taking these medications    Details   !! clotrimazole-betamethasone (LOTRISONE) 1-0 05 % cream Apply to affected area 2 times daily, Normal      predniSONE 10 mg tablet Take 1 tablet (10 mg total) by mouth daily with breakfast 6 tabs PO qDaily x2 days, 5 tabs PO qD x 2d, 4 tabs PO qD x 2d, 3 tabs PO qD x 2d, 2 tabs PO qD x 2d, 1 tab PO qD x 2d, Starting Mon 3/2/2020, Normal       !! - Potential duplicate medications found  Please discuss with provider        CONTINUE these medications which have CHANGED    Details   cetirizine (ZyrTEC) 10 mg tablet Take 1 tablet (10 mg total) by mouth daily as needed for allergies or rhinitis, Starting Mon 3/2/2020, Normal      diphenhydrAMINE (BENADRYL) 25 mg tablet Take 1 tablet (25 mg total) by mouth every 6 (six) hours as needed for itching (Breakthrough Itching), Starting Mon 3/2/2020, Normal         CONTINUE these medications which have NOT CHANGED    Details albuterol (PROAIR HFA) 90 mcg/act inhaler Inhale 2 puffs every 6 (six) hours as needed for wheezing, Starting Mon 1/14/2019, Normal      clonazePAM (KlonoPIN) 0 5 mg tablet Take by mouth 4 (four) times a day as needed, Starting Mon 12/19/2011, Historical Med      !! clotrimazole (LOTRIMIN) 1 % cream Apply to affected area 2 times daily, Print      !! clotrimazole (LOTRIMIN) 1 % cream Apply to affected area 2 times daily, Normal      !! clotrimazole-betamethasone (LOTRISONE) 1-0 05 % cream Apply 1 application topically 2 (two) times a day To affected area, Starting Thu 3/28/2019, Normal      [facility] ALL[facility] 25 MG capsule Take 1 capsule (25 mg total) by mouth daily at bedtime, Starting Mon 2/26/2018, Normal      cyclobenzaprine (FLEXERIL) 10 mg tablet Take 10 mg by mouth 3 (three) times a day as needed for muscle spasms, Historical Med      EPINEPHrine (EPIPEN 2-ARUN) 0 3 mg/0 3 mL SOAJ Use in case of anaphylaxis reaction, Normal      fluticasone (FLONASE) 50 mcg/act nasal spray USED 2 SPRAY EACH NOSTRIL ONCE A DAY, Normal      ibuprofen (MOTRIN) 600 mg tablet Take by mouth, Starting Mon 10/23/2017, Historical Med      lidocaine (LIDODERM) 5 % Place 1 patch on the skin every 24 hours Remove & Discard patch within 12 hours or as directed by MD, Until Discontinued, Historical Med      Menthol, Topical Analgesic, (Flexall 454) 16 % GEL Apply topically  , Until Discontinued, Historical Med      olopatadine (PATANOL) 0 1 % ophthalmic solution Administer 1 drop to both eyes 2 (two) times a day, Starting Sun 6/9/2019, Print      tamsulosin (FLOMAX) 0 4 mg Starting Mon 12/9/2019, Historical Med      triamcinolone (KENALOG) 0 5 % ointment Apply topically 2 (two) times a day, Starting Tue 3/19/2019, Normal      zolpidem (AMBIEN) 10 mg tablet Starting Sun 2/25/2018, Historical Med       !! - Potential duplicate medications found  Please discuss with provider  No discharge procedures on file      PDMP Review     None ED Provider  Electronically Signed by           Carolynn Romberg, PA-C  03/02/20 1042

## 2020-03-28 DIAGNOSIS — R21 RASH: ICD-10-CM

## 2020-03-28 DIAGNOSIS — L50.9 URTICARIA: ICD-10-CM

## 2020-03-30 RX ORDER — CLOTRIMAZOLE AND BETAMETHASONE DIPROPIONATE 10; .64 MG/G; MG/G
CREAM TOPICAL
Qty: 45 G | Refills: 0 | OUTPATIENT
Start: 2020-03-30

## 2020-03-31 ENCOUNTER — TELEMEDICINE (OUTPATIENT)
Dept: FAMILY MEDICINE CLINIC | Facility: CLINIC | Age: 65
End: 2020-03-31
Payer: MEDICARE

## 2020-03-31 DIAGNOSIS — L23.7 ALLERGIC CONTACT DERMATITIS DUE TO PLANTS, EXCEPT FOOD: ICD-10-CM

## 2020-03-31 DIAGNOSIS — L50.9 URTICARIA: ICD-10-CM

## 2020-03-31 DIAGNOSIS — R21 RASH: ICD-10-CM

## 2020-03-31 PROCEDURE — 99214 OFFICE O/P EST MOD 30 MIN: CPT | Performed by: FAMILY MEDICINE

## 2020-03-31 RX ORDER — TRIAMCINOLONE ACETONIDE 5 MG/G
OINTMENT TOPICAL 2 TIMES DAILY
Qty: 30 G | Refills: 0 | Status: SHIPPED | OUTPATIENT
Start: 2020-03-31 | End: 2020-03-31 | Stop reason: SDUPTHER

## 2020-03-31 RX ORDER — TRIAMCINOLONE ACETONIDE 5 MG/G
OINTMENT TOPICAL 2 TIMES DAILY
Qty: 30 G | Refills: 1 | Status: SHIPPED | OUTPATIENT
Start: 2020-03-31 | End: 2020-09-09

## 2020-03-31 RX ORDER — FLUCONAZOLE 150 MG/1
150 TABLET ORAL DAILY
Qty: 3 TABLET | Refills: 0 | Status: SHIPPED | OUTPATIENT
Start: 2020-03-31 | End: 2020-04-03

## 2020-04-27 DIAGNOSIS — T78.40XS ALLERGIC DISORDER, SEQUELA: ICD-10-CM

## 2020-04-27 RX ORDER — FLUTICASONE PROPIONATE 50 MCG
SPRAY, SUSPENSION (ML) NASAL
Qty: 48 ML | Refills: 1 | Status: SHIPPED | OUTPATIENT
Start: 2020-04-27 | End: 2020-09-14

## 2020-05-10 DIAGNOSIS — J45.20 MILD INTERMITTENT ASTHMA WITHOUT COMPLICATION: ICD-10-CM

## 2020-05-10 RX ORDER — ALBUTEROL SULFATE 90 UG/1
AEROSOL, METERED RESPIRATORY (INHALATION)
Qty: 8.5 INHALER | Refills: 3 | Status: SHIPPED | OUTPATIENT
Start: 2020-05-10 | End: 2020-08-24

## 2020-08-21 DIAGNOSIS — J45.20 MILD INTERMITTENT ASTHMA WITHOUT COMPLICATION: ICD-10-CM

## 2020-08-24 RX ORDER — ALBUTEROL SULFATE 90 UG/1
AEROSOL, METERED RESPIRATORY (INHALATION)
Qty: 8.5 INHALER | Refills: 3 | Status: SHIPPED | OUTPATIENT
Start: 2020-08-24 | End: 2022-01-27

## 2020-09-09 DIAGNOSIS — L23.7 ALLERGIC CONTACT DERMATITIS DUE TO PLANTS, EXCEPT FOOD: ICD-10-CM

## 2020-09-09 RX ORDER — TRIAMCINOLONE ACETONIDE 5 MG/G
OINTMENT TOPICAL
Qty: 30 G | Refills: 1 | Status: SHIPPED | OUTPATIENT
Start: 2020-09-09 | End: 2021-02-04

## 2020-09-11 DIAGNOSIS — T78.40XS ALLERGIC DISORDER, SEQUELA: ICD-10-CM

## 2020-09-14 RX ORDER — FLUTICASONE PROPIONATE 50 MCG
SPRAY, SUSPENSION (ML) NASAL
Qty: 48 ML | Refills: 1 | Status: SHIPPED | OUTPATIENT
Start: 2020-09-14 | End: 2021-03-02

## 2021-02-04 ENCOUNTER — OFFICE VISIT (OUTPATIENT)
Dept: FAMILY MEDICINE CLINIC | Facility: CLINIC | Age: 66
End: 2021-02-04
Payer: MEDICARE

## 2021-02-04 VITALS — BODY MASS INDEX: 27.99 KG/M2 | WEIGHT: 189 LBS | HEIGHT: 69 IN

## 2021-02-04 DIAGNOSIS — T78.40XS ALLERGIC DISORDER, SEQUELA: Primary | ICD-10-CM

## 2021-02-04 DIAGNOSIS — L50.9 URTICARIA: ICD-10-CM

## 2021-02-04 DIAGNOSIS — L23.7 ALLERGIC CONTACT DERMATITIS DUE TO PLANTS, EXCEPT FOOD: ICD-10-CM

## 2021-02-04 PROCEDURE — 99213 OFFICE O/P EST LOW 20 MIN: CPT | Performed by: FAMILY MEDICINE

## 2021-02-04 RX ORDER — FEXOFENADINE HCL 180 MG/1
180 TABLET ORAL 2 TIMES DAILY
Qty: 60 TABLET | Refills: 1 | Status: SHIPPED | OUTPATIENT
Start: 2021-02-04

## 2021-02-04 RX ORDER — TRIAMCINOLONE ACETONIDE 1 MG/G
CREAM TOPICAL 2 TIMES DAILY
Qty: 454 G | Refills: 1 | Status: SHIPPED | OUTPATIENT
Start: 2021-02-04

## 2021-02-04 RX ORDER — PREDNISONE 10 MG/1
TABLET ORAL
Qty: 18 TABLET | Refills: 0 | Status: SHIPPED | OUTPATIENT
Start: 2021-02-04 | End: 2022-04-25

## 2021-02-04 NOTE — PROGRESS NOTES
Virtual Regular Visit      Assessment/Plan:    Problem List Items Addressed This Visit        Musculoskeletal and Integument    Allergic contact dermatitis due to plants, except food    Relevant Medications    predniSONE 10 mg tablet    fexofenadine (ALLEGRA) 180 MG tablet    triamcinolone (KENALOG) 0 1 % cream    Urticaria    Relevant Medications    predniSONE 10 mg tablet    fexofenadine (ALLEGRA) 180 MG tablet    triamcinolone (KENALOG) 0 1 % cream       Other    Allergic - Primary    Relevant Medications    predniSONE 10 mg tablet    fexofenadine (ALLEGRA) 180 MG tablet    triamcinolone (KENALOG) 0 1 % cream               Reason for visit is   Chief Complaint   Patient presents with    Rash    Virtual Regular Visit        Encounter provider Maura Santiago MD    Provider located at [address] [place] [id number]      Recent Visits  No visits were found meeting these conditions  Showing recent visits within past 7 days and meeting all other requirements     Today's Visits  Date Type Provider Dept   02/04/21 Office Visit Maura Santiago MD [facility]   Showing today's visits and meeting all other requirements     Future Appointments  No visits were found meeting these conditions  Showing future appointments within next 150 days and meeting all other requirements        The patient was identified by name and date of birth  Rosmery Stern was informed that this is a telemedicine visit and that the visit is being conducted through [address] [place] [address] and patient was informed that this is not a secure, HIPAA-compliant platform  He agrees to proceed     My office door was closed  No one else was in the room  He acknowledged consent and understanding of privacy and security of the video platform  The patient has agreed to participate and understands they can discontinue the visit at any time  Patient is aware this is a billable service       [facility] Kelsie Dos Santos is a 60 y o  male          HPI     [place] or the fish that she made   Hives   Calamine and hydrcort and beandryl   Yesterday   Soy in the tuna   Chocolate might have been in the pain that the salmon was cooked in       Past Medical History:   Diagnosis Date    Allergic rhinitis 01/13/2011    Anxiety     Asthma     Insomnia     Kidney stone     Myalgia and Myositis 11/13/2007    Pain     chronic neck,back, spine    Psychiatric disorder     Temporomandibular joint disorder 01/06/2009       Past Surgical History:   Procedure Laterality Date    KIDNEY STONE SURGERY      pt poor historian       Current Outpatient Medications   Medication Sig Dispense Refill    albuterol (PROVENTIL HFA,VENTOLIN HFA) 90 mcg/act inhaler INHALE 2 PUFFS BY MOUTH EVERY 6 HOURS AS NEEDED FOR WHEEZE 8 5 Inhaler 3    cetirizine (ZyrTEC) 10 mg tablet Take 1 tablet (10 mg total) by mouth daily as needed for allergies or rhinitis 30 tablet 0    clonazePAM (KlonoPIN) 0 5 mg tablet Take by mouth 4 (four) times a day as needed      clotrimazole (LOTRIMIN) 1 % cream Apply to affected area 2 times daily 28 g 0    [facility] ALLERGY 25 MG capsule Take 1 capsule (25 mg total) by mouth daily at bedtime 30 capsule 1    cyclobenzaprine (FLEXERIL) 10 mg tablet Take 10 mg by mouth 3 (three) times a day as needed for muscle spasms      diclofenac sodium (VOLTAREN) 1 % APPLY TO AFFECTED AREA EVERY DAY      diphenhydrAMINE (BENADRYL) 25 mg tablet Take 1 tablet (25 mg total) by mouth every 6 (six) hours as needed for itching (Breakthrough Itching) 30 tablet 0    EPINEPHrine (EPIPEN 2-ARUN) 0 3 mg/0 3 mL SOAJ Use in case of anaphylaxis reaction 1 each 1    fluticasone (FLONASE) 50 mcg/act nasal spray USED 2 SPRAY EACH NOSTRIL ONCE A DAY 48 mL 1    ibuprofen (MOTRIN) 200 mg tablet Take 200 mg by mouth every 6 (six) hours as needed for mild pain       lidocaine (LIDODERM) 5 % Place 1 patch on the skin every 24 hours Remove & Discard patch within 12 hours or as directed by MD      Menthol, Topical Analgesic, (Flexall 454) 16 % GEL Apply topically   olopatadine (PATANOL) 0 1 % ophthalmic solution Administer 1 drop to both eyes 2 (two) times a day 5 mL 0    tamsulosin (FLOMAX) 0 4 mg       zolpidem (AMBIEN) 10 mg tablet       fexofenadine (ALLEGRA) 180 MG tablet Take 1 tablet (180 mg total) by mouth 2 (two) times a day 60 tablet 1    predniSONE 10 mg tablet Take 1 tablet tid x3d then 1 tab bid x3d then 1 tab qd x3d 18 tablet 0    triamcinolone (KENALOG) 0 1 % cream Apply topically 2 (two) times a day 454 g 1     No current facility-administered medications for this visit  Allergies   Allergen Reactions    Codeine Hives     Reaction Date: [id number];     Levofloxacin Hives    Penicillins Hives     Reaction Date: [id number];        Review of Systems   Constitutional: Negative for activity change, appetite change and fever  HENT: Negative for congestion, nosebleeds and trouble swallowing  Eyes: Negative for itching  Respiratory: Negative for cough and chest tightness  Cardiovascular: Negative for chest pain and palpitations  Gastrointestinal: Negative for abdominal pain, constipation, diarrhea and nausea  Endocrine: Negative for cold intolerance  Genitourinary: Negative for frequency  Musculoskeletal: Negative for gait problem and joint swelling  Skin: Positive for rash  Allergic/Immunologic: Negative for immunocompromised state  Neurological: Negative for dizziness, tremors, seizures, syncope and headaches  Psychiatric/Behavioral: Negative for hallucinations and suicidal ideas  Video Exam    Vitals:    02/04/21 0913   Weight: 85 7 kg (189 lb)   Height: 5' 9" (1 753 m)       Physical Exam  Constitutional:       Appearance: He is well-developed  HENT:      Head: Normocephalic and atraumatic  Neck:      Musculoskeletal: Normal range of motion     Pulmonary:      Effort: Pulmonary effort is normal    Skin: Findings: Rash present  Neurological:      Mental Status: He is alert and oriented to person, place, and time  Psychiatric:         Behavior: Behavior normal          Thought Content: Thought content normal          Judgment: Judgment normal           I spent 15 minutes directly with the patient during this visit      VIRTUAL VISIT DISCLAIMER    Mayte Oneal acknowledges that he has consented to an online visit or consultation  He understands that the online visit is based solely on information provided by him, and that, in the absence of a face-to-face physical evaluation by the physician, the diagnosis he receives is both limited and provisional in terms of accuracy and completeness  This is not intended to replace a full medical face-to-face evaluation by the physician  Mayte Oneal understands and accepts these terms

## 2021-03-02 DIAGNOSIS — T78.40XS ALLERGIC DISORDER, SEQUELA: ICD-10-CM

## 2021-03-02 RX ORDER — FLUTICASONE PROPIONATE 50 MCG
SPRAY, SUSPENSION (ML) NASAL
Qty: 16 ML | Refills: 5 | Status: SHIPPED | OUTPATIENT
Start: 2021-03-02 | End: 2021-10-06

## 2021-03-11 RX ORDER — ONDANSETRON 4 MG/1
TABLET, ORALLY DISINTEGRATING ORAL
COMMUNITY
Start: 2021-02-24 | End: 2022-04-25

## 2021-03-11 RX ORDER — CLONAZEPAM 0.5 MG/1
TABLET, ORALLY DISINTEGRATING ORAL
COMMUNITY
Start: 2021-02-16

## 2021-03-15 ENCOUNTER — TELEMEDICINE (OUTPATIENT)
Dept: FAMILY MEDICINE CLINIC | Facility: CLINIC | Age: 66
End: 2021-03-15
Payer: MEDICARE

## 2021-03-15 DIAGNOSIS — Z71.85 IMMUNIZATION COUNSELING: Primary | ICD-10-CM

## 2021-03-15 DIAGNOSIS — J45.20 MILD INTERMITTENT ASTHMA WITHOUT COMPLICATION: ICD-10-CM

## 2021-03-15 DIAGNOSIS — L50.0 ALLERGIC URTICARIA: ICD-10-CM

## 2021-03-15 PROCEDURE — 99213 OFFICE O/P EST LOW 20 MIN: CPT | Performed by: FAMILY MEDICINE

## 2021-03-15 NOTE — PROGRESS NOTES
Virtual Regular Visit      Assessment/Plan:    Problem List Items Addressed This Visit        Respiratory    Asthma, mild intermittent       Musculoskeletal and Integument    Allergic urticaria       Other    Immunization counseling - Primary      Discussed with him that there is no element in the vaccine which can cause problem for him but anybody can get side effects from vaccine, he has Benadryl at home and he will take Tylenol if he get body aches soreness and also discussed with him that this is his choice if he wants to get vaccination has vaccine is to prevent complications which can happen in asthmatic patients   Also advised to come in the office for his physical and he is due for his labs  BMI Counseling: There is no height or weight on file to calculate BMI  The BMI is above normal  Nutrition recommendations include consuming healthier snacks  Exercise recommendations include exercising 3-5 times per week  Reason for visit is   Chief Complaint   Patient presents with    Virtual Regular Visit        Encounter provider Nanda Sanabria MD    Provider located at [address] [place], [place] [id number]      Recent Visits  No visits were found meeting these conditions  Showing recent visits within past 7 days and meeting all other requirements     Today's Visits  Date Type Provider Dept   03/15/21 Telemedicine Nanda Sanabria MD [facility]   Showing today's visits and meeting all other requirements     Future Appointments  No visits were found meeting these conditions  Showing future appointments within next 150 days and meeting all other requirements        The patient was identified by name and date of birth  Claudell Bras was informed that this is a telemedicine visit and that the visit is being conducted through [address] [place] [address] and patient was informed that this is not a secure, HIPAA-compliant platform  He agrees to proceed     My office door was closed  No one else was in the room  He acknowledged consent and understanding of privacy and security of the video platform  The patient has agreed to participate and understands they can discontinue the visit at any time  Patient is aware this is a billable service  Subjective  Tiff Bright is a 60 y o  male he says he is scheduling for the COVID vaccine and he just want to ask if it is okay to take the vaccine, he says he has history of intermittent asthma and he gets sometimes allergic hives , he has allergy documented for some medication and he says he gets allergy from chocolate strawberries blueberries, was otherwise he is fine, he has Benadryl             Past Medical History:   Diagnosis Date    Allergic rhinitis 01/13/2011    Anxiety     Asthma     Insomnia     Kidney stone     Myalgia and Myositis 11/13/2007    Pain     chronic neck,back, spine    Psychiatric disorder     Temporomandibular joint disorder 01/06/2009       Past Surgical History:   Procedure Laterality Date    KIDNEY STONE SURGERY      pt poor historian       Current Outpatient Medications   Medication Sig Dispense Refill    albuterol (PROVENTIL HFA,VENTOLIN HFA) 90 mcg/act inhaler INHALE 2 PUFFS BY MOUTH EVERY 6 HOURS AS NEEDED FOR WHEEZE 8 5 Inhaler 3    cetirizine (ZyrTEC) 10 mg tablet Take 1 tablet (10 mg total) by mouth daily as needed for allergies or rhinitis 30 tablet 0    clonazePAM (KlonoPIN) 0 5 MG disintegrating tablet DISSOLVE 1 TABLET BY MOUTH EVERY DAY      clonazePAM (KlonoPIN) 0 5 mg tablet Take by mouth 4 (four) times a day as needed      clotrimazole (LOTRIMIN) 1 % cream Apply to affected area 2 times daily 28 g 0    [facility] ALLERGY 25 MG capsule Take 1 capsule (25 mg total) by mouth daily at bedtime 30 capsule 1    cyclobenzaprine (FLEXERIL) 10 mg tablet Take 10 mg by mouth 3 (three) times a day as needed for muscle spasms      Diclofenac Epolamine 1 3 % PTCH       diclofenac sodium (VOLTAREN) 1 % APPLY TO AFFECTED AREA EVERY DAY      diphenhydrAMINE (BENADRYL) 25 mg tablet Take 1 tablet (25 mg total) by mouth every 6 (six) hours as needed for itching (Breakthrough Itching) 30 tablet 0    EPINEPHrine (EPIPEN 2-ARUN) 0 3 mg/0 3 mL SOAJ Use in case of anaphylaxis reaction 1 each 1    fexofenadine (ALLEGRA) 180 MG tablet Take 1 tablet (180 mg total) by mouth 2 (two) times a day 60 tablet 1    fluticasone (FLONASE) 50 mcg/act nasal spray USED 2 SPRAY EACH NOSTRIL ONCE A DAY 16 mL 5    ibuprofen (MOTRIN) 200 mg tablet Take 200 mg by mouth every 6 (six) hours as needed for mild pain       lidocaine (LIDODERM) 5 % Place 1 patch on the skin every 24 hours Remove & Discard patch within 12 hours or as directed by MD      Menthol, Topical Analgesic, (Flexall 454) 16 % GEL Apply topically   olopatadine (PATANOL) 0 1 % ophthalmic solution Administer 1 drop to both eyes 2 (two) times a day 5 mL 0    predniSONE 10 mg tablet Take 1 tablet tid x3d then 1 tab bid x3d then 1 tab qd x3d 18 tablet 0    tamsulosin (FLOMAX) 0 4 mg       triamcinolone (KENALOG) 0 1 % cream Apply topically 2 (two) times a day 454 g 1    zolpidem (AMBIEN) 10 mg tablet        No current facility-administered medications for this visit  Allergies   Allergen Reactions    Codeine Hives     Reaction Date: [id number];     Levofloxacin Hives    Penicillins Hives     Reaction Date: [id number];        Review of Systems   Constitutional: Negative  Eyes: Negative  Respiratory: Negative  Cardiovascular: Negative  Gastrointestinal: Negative  Musculoskeletal: Negative  Video Exam    There were no vitals filed for this visit  Physical Exam  Eyes:      Extraocular Movements: Extraocular movements intact  Pulmonary:      Effort: Pulmonary effort is normal  No respiratory distress  Skin:     Findings: No erythema  Neurological:      Mental Status: He is alert            I spent 15 minutes directly with the patient during this visit      VIRTUAL VISIT DISCLAIMER    Arlin Georges acknowledges that he has consented to an online visit or consultation  He understands that the online visit is based solely on information provided by him, and that, in the absence of a face-to-face physical evaluation by the physician, the diagnosis he receives is both limited and provisional in terms of accuracy and completeness  This is not intended to replace a full medical face-to-face evaluation by the physician  Arlin Georges understands and accepts these terms

## 2021-03-30 DIAGNOSIS — Z23 ENCOUNTER FOR IMMUNIZATION: ICD-10-CM

## 2021-04-03 ENCOUNTER — IMMUNIZATIONS (OUTPATIENT)
Dept: FAMILY MEDICINE CLINIC | Facility: HOSPITAL | Age: 66
End: 2021-04-03

## 2021-04-03 DIAGNOSIS — Z23 ENCOUNTER FOR IMMUNIZATION: Primary | ICD-10-CM

## 2021-04-03 PROCEDURE — 91300 SARS-COV-2 / COVID-19 MRNA VACCINE (PFIZER-BIONTECH) 30 MCG: CPT

## 2021-04-03 PROCEDURE — 0001A SARS-COV-2 / COVID-19 MRNA VACCINE (PFIZER-BIONTECH) 30 MCG: CPT

## 2021-04-24 ENCOUNTER — IMMUNIZATIONS (OUTPATIENT)
Dept: FAMILY MEDICINE CLINIC | Facility: HOSPITAL | Age: 66
End: 2021-04-24

## 2021-04-24 DIAGNOSIS — Z23 ENCOUNTER FOR IMMUNIZATION: Primary | ICD-10-CM

## 2021-04-24 PROCEDURE — 91300 SARS-COV-2 / COVID-19 MRNA VACCINE (PFIZER-BIONTECH) 30 MCG: CPT

## 2021-04-24 PROCEDURE — 0002A SARS-COV-2 / COVID-19 MRNA VACCINE (PFIZER-BIONTECH) 30 MCG: CPT

## 2021-10-05 DIAGNOSIS — T78.40XS ALLERGIC DISORDER, SEQUELA: ICD-10-CM

## 2021-10-06 RX ORDER — FLUTICASONE PROPIONATE 50 MCG
SPRAY, SUSPENSION (ML) NASAL
Qty: 16 ML | Refills: 5 | Status: SHIPPED | OUTPATIENT
Start: 2021-10-06 | End: 2022-04-29 | Stop reason: SDUPTHER

## 2022-01-27 DIAGNOSIS — J45.20 MILD INTERMITTENT ASTHMA WITHOUT COMPLICATION: ICD-10-CM

## 2022-01-27 RX ORDER — ALBUTEROL SULFATE 90 UG/1
AEROSOL, METERED RESPIRATORY (INHALATION)
Qty: 8.5 G | Refills: 3 | Status: SHIPPED | OUTPATIENT
Start: 2022-01-27 | End: 2022-05-09 | Stop reason: SDUPTHER

## 2022-01-30 ENCOUNTER — IMMUNIZATIONS (OUTPATIENT)
Dept: FAMILY MEDICINE CLINIC | Facility: HOSPITAL | Age: 67
End: 2022-01-30

## 2022-04-25 ENCOUNTER — HOSPITAL ENCOUNTER (EMERGENCY)
Facility: HOSPITAL | Age: 67
Discharge: HOME | End: 2022-04-25
Attending: EMERGENCY MEDICINE
Payer: MEDICARE

## 2022-04-25 VITALS
OXYGEN SATURATION: 97 % | HEART RATE: 98 BPM | RESPIRATION RATE: 18 BRPM | DIASTOLIC BLOOD PRESSURE: 90 MMHG | SYSTOLIC BLOOD PRESSURE: 157 MMHG | TEMPERATURE: 97.9 F

## 2022-04-25 DIAGNOSIS — K62.5 RECTAL BLEEDING: Primary | ICD-10-CM

## 2022-04-25 LAB
ABO GROUP BLD: NORMAL
ALBUMIN SERPL BCP-MCNC: 3.8 G/DL (ref 3.5–5)
ALP SERPL-CCNC: 82 U/L (ref 46–116)
ALT SERPL W P-5'-P-CCNC: 26 U/L (ref 12–78)
ANION GAP SERPL CALCULATED.3IONS-SCNC: 9 MMOL/L (ref 4–13)
APTT PPP: 33 SECONDS (ref 23–37)
AST SERPL W P-5'-P-CCNC: 19 U/L (ref 5–45)
BASOPHILS # BLD AUTO: 0.05 THOUSANDS/ΜL (ref 0–0.1)
BASOPHILS NFR BLD AUTO: 1 % (ref 0–1)
BILIRUB SERPL-MCNC: 0.24 MG/DL (ref 0.2–1)
BLD GP AB SCN SERPL QL: NEGATIVE
BUN SERPL-MCNC: 22 MG/DL (ref 5–25)
CALCIUM SERPL-MCNC: 9.1 MG/DL (ref 8.3–10.1)
CHLORIDE SERPL-SCNC: 106 MMOL/L (ref 100–108)
CO2 SERPL-SCNC: 26 MMOL/L (ref 21–32)
CREAT SERPL-MCNC: 1.11 MG/DL (ref 0.6–1.3)
EOSINOPHIL # BLD AUTO: 0.1 THOUSAND/ΜL (ref 0–0.61)
EOSINOPHIL NFR BLD AUTO: 1 % (ref 0–6)
ERYTHROCYTE [DISTWIDTH] IN BLOOD BY AUTOMATED COUNT: 13.7 % (ref 11.6–15.1)
GFR SERPL CREATININE-BSD FRML MDRD: 69 ML/MIN/1.73SQ M
GLUCOSE SERPL-MCNC: 145 MG/DL (ref 65–140)
HCT VFR BLD AUTO: 46.7 % (ref 36.5–49.3)
HGB BLD-MCNC: 15 G/DL (ref 12–17)
IMM GRANULOCYTES # BLD AUTO: 0.03 THOUSAND/UL (ref 0–0.2)
IMM GRANULOCYTES NFR BLD AUTO: 0 % (ref 0–2)
INR PPP: 1.06 (ref 0.84–1.19)
LYMPHOCYTES # BLD AUTO: 1.43 THOUSANDS/ΜL (ref 0.6–4.47)
LYMPHOCYTES NFR BLD AUTO: 17 % (ref 14–44)
MCH RBC QN AUTO: 28.1 PG (ref 26.8–34.3)
MCHC RBC AUTO-ENTMCNC: 32.1 G/DL (ref 31.4–37.4)
MCV RBC AUTO: 88 FL (ref 82–98)
MONOCYTES # BLD AUTO: 0.57 THOUSAND/ΜL (ref 0.17–1.22)
MONOCYTES NFR BLD AUTO: 7 % (ref 4–12)
NEUTROPHILS # BLD AUTO: 6.47 THOUSANDS/ΜL (ref 1.85–7.62)
NEUTS SEG NFR BLD AUTO: 74 % (ref 43–75)
NRBC BLD AUTO-RTO: 0 /100 WBCS
PLATELET # BLD AUTO: 283 THOUSANDS/UL (ref 149–390)
PMV BLD AUTO: 9.7 FL (ref 8.9–12.7)
POTASSIUM SERPL-SCNC: 4.9 MMOL/L (ref 3.5–5.3)
PROT SERPL-MCNC: 7.4 G/DL (ref 6.4–8.2)
PROTHROMBIN TIME: 13.8 SECONDS (ref 11.6–14.5)
RBC # BLD AUTO: 5.33 MILLION/UL (ref 3.88–5.62)
RH BLD: POSITIVE
SODIUM SERPL-SCNC: 141 MMOL/L (ref 136–145)
SPECIMEN EXPIRATION DATE: NORMAL
WBC # BLD AUTO: 8.65 THOUSAND/UL (ref 4.31–10.16)

## 2022-04-25 PROCEDURE — 86900 BLOOD TYPING SEROLOGIC ABO: CPT | Performed by: EMERGENCY MEDICINE

## 2022-04-25 PROCEDURE — 86901 BLOOD TYPING SEROLOGIC RH(D): CPT | Performed by: EMERGENCY MEDICINE

## 2022-04-25 PROCEDURE — 99284 EMERGENCY DEPT VISIT MOD MDM: CPT | Performed by: EMERGENCY MEDICINE

## 2022-04-25 PROCEDURE — 85730 THROMBOPLASTIN TIME PARTIAL: CPT | Performed by: EMERGENCY MEDICINE

## 2022-04-25 PROCEDURE — 96360 HYDRATION IV INFUSION INIT: CPT

## 2022-04-25 PROCEDURE — 85610 PROTHROMBIN TIME: CPT | Performed by: EMERGENCY MEDICINE

## 2022-04-25 PROCEDURE — 86850 RBC ANTIBODY SCREEN: CPT | Performed by: EMERGENCY MEDICINE

## 2022-04-25 PROCEDURE — 93005 ELECTROCARDIOGRAM TRACING: CPT

## 2022-04-25 PROCEDURE — 80053 COMPREHEN METABOLIC PANEL: CPT | Performed by: EMERGENCY MEDICINE

## 2022-04-25 PROCEDURE — 36415 COLL VENOUS BLD VENIPUNCTURE: CPT | Performed by: EMERGENCY MEDICINE

## 2022-04-25 PROCEDURE — 99285 EMERGENCY DEPT VISIT HI MDM: CPT

## 2022-04-25 PROCEDURE — 85025 COMPLETE CBC W/AUTO DIFF WBC: CPT | Performed by: EMERGENCY MEDICINE

## 2022-04-25 RX ADMIN — SODIUM CHLORIDE 1000 ML: 0.9 INJECTION, SOLUTION INTRAVENOUS at 09:29

## 2022-04-25 NOTE — ED PROVIDER NOTES
History  Chief Complaint   Patient presents with    Rectal Bleeding     Pt reports nausea this AM and blood in his stool x 1 episode  Pt reports he was unable to tell the color of the blood  Patient is 67-year-old male presenting emergency department for chief complaint of GI bleed  He states yesterday he had steak approximately 7:00 p m     This morning he woke up and went to use the restroom where he noted some bright red blood in her stool  She denies nausea, vomiting last night  He was not woken from sleep  He denies hematuria  He denies abdominal pain of any kind  Denies recent change in caliber of his stool, change in bowel habits, weight loss, fevers, chills  Patient is markedly anxious given that his mother passed away from cancer when he was younger  Denies lightheadedness, chest pain, shortness of breath  Denies history of rectal bleeding  Prior to Admission Medications   Prescriptions Last Dose Informant Patient Reported? Taking? [facility] ALLERGY 25 MG capsule 4/24/2022 at Unknown time  No Yes   Sig: Take 1 capsule (25 mg total) by mouth daily at bedtime   EPINEPHrine (EPIPEN 2-ARUN) 0 3 mg/0 3 mL SOAJ   No Yes   Sig: Use in case of anaphylaxis reaction   Menthol, Topical Analgesic, (Flexall 454) 16 % GEL   Yes Yes   Sig: Apply topically     albuterol (PROVENTIL HFA,VENTOLIN HFA) 90 mcg/act inhaler More than a month at Unknown time  No No   Sig: INHALE 2 PUFFS BY MOUTH EVERY 6 HOURS AS NEEDED FOR WHEEZE   clonazePAM (KlonoPIN) 0 5 MG disintegrating tablet 4/24/2022 at Unknown time  Yes Yes   Sig: DISSOLVE 1 TABLET BY MOUTH EVERY DAY   clonazePAM (KlonoPIN) 0 5 mg tablet 4/24/2022 at Unknown time  Yes Yes   Sig: Take by mouth 4 (four) times a day as needed   clotrimazole (LOTRIMIN) 1 % cream   No No   Sig: Apply to affected area 2 times daily   cyclobenzaprine (FLEXERIL) 10 mg tablet Past Month at Unknown time  Yes Yes   Sig: Take 10 mg by mouth 3 (three) times a day as needed for muscle spasms   diclofenac sodium (VOLTAREN) 1 % Past Month at Unknown time  Yes Yes   Sig: APPLY TO AFFECTED AREA EVERY DAY   diphenhydrAMINE (BENADRYL) 25 mg tablet   No Yes   Sig: Take 1 tablet (25 mg total) by mouth every 6 (six) hours as needed for itching (Breakthrough Itching)   fexofenadine (ALLEGRA) 180 MG tablet Past Week at Unknown time  No Yes   Sig: Take 1 tablet (180 mg total) by mouth 2 (two) times a day   fluticasone (FLONASE) 50 mcg/act nasal spray Past Week at Unknown time  No Yes   Sig: USED 2 SPRAY EACH NOSTRIL ONCE A DAY   ibuprofen (MOTRIN) 200 mg tablet   Yes Yes   Sig: Take 200 mg by mouth every 6 (six) hours as needed for mild pain    lidocaine (LIDODERM) 5 %   Yes Yes   Sig: Place 1 patch on the skin every 24 hours Remove & Discard patch within 12 hours or as directed by MD   olopatadine (PATANOL) 0 1 % ophthalmic solution   No Yes   Sig: Administer 1 drop to both eyes 2 (two) times a day   triamcinolone (KENALOG) 0 1 % cream   No Yes   Sig: Apply topically 2 (two) times a day   zolpidem (AMBIEN) 10 mg tablet 4/24/2022 at Unknown time  Yes Yes      Facility-Administered Medications: None       Past Medical History:   Diagnosis Date    Allergic rhinitis 01/13/2011    Anxiety     Asthma     Insomnia     Kidney stone     Myalgia and Myositis 11/13/2007    Pain     chronic neck,back, spine    Psychiatric disorder     Temporomandibular joint disorder 01/06/2009       Past Surgical History:   Procedure Laterality Date    KIDNEY STONE SURGERY      pt poor historian       Family History   Problem Relation Age of Onset    No Known Problems Mother      I have reviewed and agree with the history as documented  E-Cigarette/Vaping     E-Cigarette/Vaping Substances     Social History     Tobacco Use    Smoking status: Never Smoker    Smokeless tobacco: Never Used   Substance Use Topics    Alcohol use: No    Drug use: No        Review of Systems   Constitutional: Negative  HENT: Negative  Eyes: Negative  Respiratory: Negative  Cardiovascular: Negative  Gastrointestinal: Positive for blood in stool  Endocrine: Negative  Genitourinary: Negative  Musculoskeletal: Negative  Skin: Negative  Allergic/Immunologic: Negative  Neurological: Negative  Hematological: Negative  Psychiatric/Behavioral: Negative  All other systems reviewed and are negative  Physical Exam  ED Triage Vitals   Temperature Pulse Respirations Blood Pressure SpO2   04/25/22 0747 04/25/22 0747 04/25/22 0747 04/25/22 0747 04/25/22 0747   97 9 °F (36 6 °C) (!) 124 20 135/86 100 %      Temp Source Heart Rate Source Patient Position - Orthostatic VS BP Location FiO2 (%)   04/25/22 0747 04/25/22 0747 04/25/22 0845 04/25/22 0845 --   Oral Monitor Sitting Right arm       Pain Score       04/25/22 0747       No Pain             Orthostatic Vital Signs  Vitals:    04/25/22 0830 04/25/22 0845 04/25/22 0900 04/25/22 1000   BP:  148/86 145/88 157/90   Pulse: (!) 110 (!) 108 (!) 106 98   Patient Position - Orthostatic VS:  Sitting Sitting Sitting       Physical Exam  Vitals and nursing note reviewed  Constitutional:       General: He is not in acute distress  Appearance: Normal appearance  He is not ill-appearing, toxic-appearing or diaphoretic  Comments: Patient profoundly anxious   HENT:      Head: Normocephalic and atraumatic  Eyes:      General: No scleral icterus  Right eye: No discharge  Left eye: No discharge  Extraocular Movements: Extraocular movements intact  Conjunctiva/sclera: Conjunctivae normal       Pupils: Pupils are equal, round, and reactive to light  Cardiovascular:      Rate and Rhythm: Tachycardia present  Pulses: Normal pulses  Heart sounds: Normal heart sounds  No murmur heard  No friction rub  No gallop  Pulmonary:      Effort: Pulmonary effort is normal  No respiratory distress  Breath sounds: Normal breath sounds  No stridor  No wheezing, rhonchi or rales  Abdominal:      General: Abdomen is flat  Bowel sounds are normal  There is no distension  Palpations: Abdomen is soft  Tenderness: There is no abdominal tenderness  There is no guarding or rebound  Genitourinary:     Rectum: Guaiac result positive  No tenderness  Comments: guiac positive, gross external blood  Musculoskeletal:         General: No swelling  Normal range of motion  Cervical back: Normal range of motion  No rigidity  Right lower leg: No edema  Left lower leg: No edema  Skin:     General: Skin is warm and dry  Capillary Refill: Capillary refill takes less than 2 seconds  Coloration: Skin is not jaundiced  Findings: No bruising or lesion  Neurological:      General: No focal deficit present  Mental Status: He is alert and oriented to person, place, and time  Mental status is at baseline  Cranial Nerves: No cranial nerve deficit  Sensory: No sensory deficit  Motor: No weakness  Gait: Gait normal    Psychiatric:         Mood and Affect: Mood normal          Behavior: Behavior normal          Thought Content:  Thought content normal          Judgment: Judgment normal          ED Medications  Medications   sodium chloride 0 9 % bolus 1,000 mL (0 mL Intravenous Stopped 4/25/22 1010)       Diagnostic Studies  Results Reviewed     Procedure Component Value Units Date/Time    Comprehensive metabolic panel [[id number]]  (Abnormal) Collected: 04/25/22 0834    Lab Status: Final result Specimen: Blood from Arm, Right Updated: 04/25/22 0918     Sodium 141 mmol/L      Potassium 4 9 mmol/L      Chloride 106 mmol/L      CO2 26 mmol/L      ANION GAP 9 mmol/L      BUN 22 mg/dL      Creatinine 1 11 mg/dL      Glucose 145 mg/dL      Calcium 9 1 mg/dL      AST 19 U/L      ALT 26 U/L      Alkaline Phosphatase 82 U/L      Total Protein 7 4 g/dL      Albumin 3 8 g/dL      Total Bilirubin 0 24 mg/dL      eGFR 69 ml/min/1 73sq m     Narrative:      National Kidney Disease Foundation guidelines for Chronic Kidney Disease (CKD):     Stage 1 with normal or high GFR (GFR > 90 mL/min/1 73 square meters)    Stage 2 Mild CKD (GFR = 60-89 mL/min/1 73 square meters)    Stage 3A Moderate CKD (GFR = 45-59 mL/min/1 73 square meters)    Stage 3B Moderate CKD (GFR = 30-44 mL/min/1 73 square meters)    Stage 4 Severe CKD (GFR = 15-29 mL/min/1 73 square meters)    Stage 5 End Stage CKD (GFR <15 mL/min/1 73 square meters)  Note: GFR calculation is accurate only with a steady state creatinine    Protime-INR [[id number]]  (Normal) Collected: 04/25/22 0834    Lab Status: Final result Specimen: Blood from Arm, Right Updated: 04/25/22 0907     Protime 13 8 seconds      INR 1 06    APTT [[id number]]  (Normal) Collected: 04/25/22 0834    Lab Status: Final result Specimen: Blood from Arm, Right Updated: 04/25/22 0907     PTT 33 seconds     CBC and differential [[id number]] Collected: 04/25/22 0834    Lab Status: Final result Specimen: Blood from Arm, Right Updated: 04/25/22 0843     WBC 8 65 Thousand/uL      RBC 5 33 Million/uL      Hemoglobin 15 0 g/dL      Hematocrit 46 7 %      MCV 88 fL      MCH 28 1 pg      MCHC 32 1 g/dL      RDW 13 7 %      MPV 9 7 fL      Platelets 296 Thousands/uL      nRBC 0 /100 WBCs      Neutrophils Relative 74 %      Immat GRANS % 0 %      Lymphocytes Relative 17 %      Monocytes Relative 7 %      Eosinophils Relative 1 %      Basophils Relative 1 %      Neutrophils Absolute 6 47 Thousands/µL      Immature Grans Absolute 0 03 Thousand/uL      Lymphocytes Absolute 1 43 Thousands/µL      Monocytes Absolute 0 57 Thousand/µL      Eosinophils Absolute 0 10 Thousand/µL      Basophils Absolute 0 05 Thousands/µL                  No orders to display         Procedures  ECG 12 Lead Documentation Only    Date/Time: 4/25/2022 9:02 AM  Performed by: Tamera Buerger, DO  Authorized by: Tamera Buerger, DO Indications / Diagnosis:  Tachycardia  ECG reviewed by me, the ED Provider: yes    Patient location:  ED  Previous ECG:     Previous ECG:  Compared to current    Similarity:  No change  Interpretation:     Interpretation: normal    Rate:     ECG rate:  130    ECG rate assessment: normal    Rhythm:     Rhythm: sinus rhythm    Ectopy:     Ectopy: none    QRS:     QRS axis:  Normal  Conduction:     Conduction: normal    ST segments:     ST segments:  Normal  T waves:     T waves: normal            ED Course                             SBIRT 22yo+      Most Recent Value   SBIRT (22 yo +)    In order to provide better care to our patients, we are screening all of our patients for alcohol and drug use  Would it be okay to ask you these screening questions? Unable to answer at this time Filed at: 04/25/2022 0750                MDM  Number of Diagnoses or Management Options  Rectal bleeding  Diagnosis management comments: -patient presenting to emergency department for evaluation of rectal bleeding  -on physical exam patient has bright red blood around his rectum  Difficult to visualize rectal area secondary to anatomy  Guaiac-positive   -abdominal exam within normal limits  Patient has no additional complaints   -laboratory evaluation unremarkable  Patient was initially tachycardic but when speaking with provider heart rate would drop into the 90s  -I think this patient is safe for discharge  He was given return precautions, instructions follow-up with GI  Patient amenable to discharge   -patient discharged from emergency department         Amount and/or Complexity of Data Reviewed  Clinical lab tests: ordered and reviewed  Tests in the medicine section of CPT®: ordered and reviewed  Decide to obtain previous medical records or to obtain history from someone other than the patient: yes  Review and summarize past medical records: yes  Discuss the patient with other providers: yes  Independent visualization of images, tracings, or specimens: yes        Disposition  Final diagnoses:   Rectal bleeding     Time reflects when diagnosis was documented in both MDM as applicable and the Disposition within this note     Time User Action Codes Description Comment    4/25/2022 10:05 AM Catherine Gallardo Janice [K62 [id number]] Rectal bleeding       ED Disposition     ED Disposition Condition Date/Time Comment    Discharge Stable Mon Apr 25, 2022 10:05 AM Faisal Fonseca discharge to home/self care              Follow-up Information     Follow up With Specialties Details Why Contact Info Additional [facility] Call in 1 day  [address] [phone] [facility] [place], [place] [address] [place], [place], [address]          Discharge Medication List as of 4/25/2022 10:05 AM      CONTINUE these medications which have NOT CHANGED    Details   clonazePAM (KlonoPIN) 0 5 MG disintegrating tablet DISSOLVE 1 TABLET BY MOUTH EVERY DAY, Historical Med      clonazePAM (KlonoPIN) 0 5 mg tablet Take by mouth 4 (four) times a day as needed, Starting Mon 12/19/2011, Historical Med      [facility] ALLERGY 25 MG capsule Take 1 capsule (25 mg total) by mouth daily at bedtime, Starting Mon 2/26/2018, Normal      cyclobenzaprine (FLEXERIL) 10 mg tablet Take 10 mg by mouth 3 (three) times a day as needed for muscle spasms, Historical Med      diclofenac sodium (VOLTAREN) 1 % APPLY TO AFFECTED AREA EVERY DAY, Historical Med      diphenhydrAMINE (BENADRYL) 25 mg tablet Take 1 tablet (25 mg total) by mouth every 6 (six) hours as needed for itching (Breakthrough Itching), Starting Mon 3/2/2020, Normal      EPINEPHrine (EPIPEN 2-ARUN) 0 3 mg/0 3 mL SOAJ Use in case of anaphylaxis reaction, Normal      fexofenadine (ALLEGRA) 180 MG tablet Take 1 tablet (180 mg total) by mouth 2 (two) times a day, Starting Thu 2/4/2021, Normal      fluticasone (FLONASE) 50 mcg/act nasal spray USED 2 SPRAY EACH NOSTRIL ONCE A DAY, Normal      ibuprofen (MOTRIN) 200 mg tablet Take 200 mg by mouth every 6 (six) hours as needed for mild pain , Starting Mon 10/23/2017, Historical Med      lidocaine (LIDODERM) 5 % Place 1 patch on the skin every 24 hours Remove & Discard patch within 12 hours or as directed by MD, Until Discontinued, Historical Med      Menthol, Topical Analgesic, (Flexall 454) 16 % GEL Apply topically  , Until Discontinued, Historical Med      olopatadine (PATANOL) 0 1 % ophthalmic solution Administer 1 drop to both eyes 2 (two) times a day, Starting Sun 6/9/2019, Print      triamcinolone (KENALOG) 0 1 % cream Apply topically 2 (two) times a day, Starting Thu 2/4/2021, Normal      zolpidem (AMBIEN) 10 mg tablet Starting Sun 2/25/2018, Historical Med      albuterol (PROVENTIL HFA,VENTOLIN HFA) 90 mcg/act inhaler INHALE 2 PUFFS BY MOUTH EVERY 6 HOURS AS NEEDED FOR WHEEZE, Normal      clotrimazole (LOTRIMIN) 1 % cream Apply to affected area 2 times daily, Print           No discharge procedures on file  PDMP Review     None           ED Provider  Attending physically available and evaluated Regino Rod  I managed the patient along with the ED Attending      Electronically Signed by         Christina Lowe DO  04/25/22 7826

## 2022-04-25 NOTE — ED ATTENDING ATTESTATION
4/25/2022  IMayelin DO, saw and evaluated the patient  I have discussed the patient with the resident/non-physician practitioner and agree with the resident's/non-physician practitioner's findings, Plan of Care, and MDM as documented in the resident's/non-physician practitioner's note, except where noted  All available labs and Radiology studies were reviewed  I was present for key portions of any procedure(s) performed by the resident/non-physician practitioner and I was immediately available to provide assistance  At this point I agree with the current assessment done in the Emergency Department  I have conducted an independent evaluation of this patient a history and physical is as follows:      67-year-old male presents with blood in his stool  , says he went to the bathroom this morning had a normal brown bowel movement was followed by degree of brownish water and shortly after had a few drops of blood the toilet and drops on the tissue paper  No bleeding since  , patient says he otherwise feels very well just extremely anxious  Says he has been praying the Rosary nonstop since he saw the blood and admits that he is extremely nervous that he has cancer because his mother rectal bleeding that is when they diagnosed cancer in her when he was a small child  He is 73years old has never had a colonoscopy, says he remembers his doctors talking to him about a but he never got done  Prior to this episode patient was in his complete normal state of health, no dizziness no weakness falls no injury no headaches no abdominal pain no nausea no vomiting no bowel changes  Patient denies bleeding anywhere else  Review of Systems   Constitutional: Negative for activity change, chills, diaphoresis and fever  HENT: Negative for congestion, sinus pressure and sore throat  Eyes: Negative for pain and visual disturbance     Respiratory: Negative for cough, chest tightness, shortness of breath, wheezing and stridor  Cardiovascular: Negative for chest pain and palpitations  Gastrointestinal: Negative for abdominal distention, abdominal pain, constipation,, nausea and vomiting  Positive for blood in stool  Genitourinary: Negative for dysuria and frequency  Musculoskeletal: Negative for neck pain and neck stiffness  Skin: Negative for rash  Neurological: Negative for dizziness, speech difficulty, light-headedness, numbness and headaches  Physical Exam  Vitals reviewed  Constitutional:       General: He is not in acute distress  Appearance: He is well-developed  He is not diaphoretic  HENT:      Head: Normocephalic and atraumatic  Right Ear: External ear normal       Left Ear: External ear normal       Nose: Nose normal    Eyes:      General:         Right eye: No discharge  Left eye: No discharge  Pupils: Pupils are equal, round, and reactive to light  Neck:      Trachea: No tracheal deviation  Cardiovascular:      Rate and Rhythm: Normal rate and regular rhythm  Heart sounds: Normal heart sounds  No murmur heard  Pulmonary:      Effort: Pulmonary effort is normal  No respiratory distress  Breath sounds: Normal breath sounds  No stridor  Abdominal:      General: There is no distension  Palpations: Abdomen is soft  Tenderness: There is no abdominal tenderness  There is no guarding or rebound  Genitourinary:     Comments: Rectal exam performed by resident, did have bright red blood, and small amount of brown stool  Musculoskeletal:         General: Normal range of motion  Cervical back: Normal range of motion and neck supple  Skin:     General: Skin is warm and dry  Coloration: Skin is not pale  Findings: No erythema  Neurological:      General: No focal deficit present  Mental Status: He is alert and oriented to person, place, and time                  ED Course  ED Course as of 04/25/22 1252   Mon Apr 25, 2022   1009 Repeat evaluation, patient's heart rate in the 90s the entire time I was in the room  , admits that when we are out of the room he is extremely anxious, feels more comfortable when we are in the room talking to him  , patient has been tachycardic basically the hold ED stay I do feel this is related to anxiety  , did explain to him this could be related to bleeding the has not had any bowel movements since he has been here and has not had a bloody bowel movement since about 6:00 a m  When he woke up  And again it sounds like it was only a small amount of blood  , very strict return parameters for high volume bleeding, I did go over this with the patient 5 separate times, the to the point where we went through exact scenarios if when he has a bowel movement if he sees a small amount of blood he should not come back but if to large amount he should come back, I do question his understanding of this as he keeps going back in telling me about how his mother had cancer when he was a child, he does seem very traumatized by this, but eventually going through the exact sign areas of small amount of blood large amount of blood he seemed to eventually understand, I do suspect patient will likely return to the emergency department for recurrent bleeding  , but my on his plan is that he needs to follow-up for outpatient colonoscopy as he is overdue for a screening colonoscopy, but at this point now that he is having bleeding he may require diagnostic, regardless needs to follow with PCP and Gastroenterology  He understands and repeat the plan back to me           Critical Care Time  Procedures        Labs Reviewed   COMPREHENSIVE METABOLIC PANEL - Abnormal       Result Value Ref Range Status    Sodium 141  136 - 145 mmol/L Final    Potassium 4 9  3 5 - 5 3 mmol/L Final    Chloride 106  100 - 108 mmol/L Final    CO2 26  21 - 32 mmol/L Final    ANION GAP 9  4 - 13 mmol/L Final    BUN 22  5 - 25 mg/dL Final    Creatinine 1 11  0 60 - 1 30 mg/dL Final    Comment: Standardized to IDMS reference method    Glucose 145 (*) 65 - 140 mg/dL Final    Comment: If the patient is fasting, the ADA then defines impaired fasting glucose as > 100 mg/dL and diabetes as > or equal to 123 mg/dL  Specimen collection should occur prior to Sulfasalazine administration due to the potential for falsely depressed results  Specimen collection should occur prior to Sulfapyridine administration due to the potential for falsely elevated results  Calcium 9 1  8 3 - 10 1 mg/dL Final    AST 19  5 - 45 U/L Final    Comment: Specimen collection should occur prior to Sulfasalazine administration due to the potential for falsely depressed results  ALT 26  12 - 78 U/L Final    Comment: Specimen collection should occur prior to Sulfasalazine administration due to the potential for falsely depressed results  Alkaline Phosphatase 82  46 - 116 U/L Final    Total Protein 7 4  6 4 - 8 2 g/dL Final    Albumin 3 8  3 5 - 5 0 g/dL Final    Total Bilirubin 0 24  0 20 - 1 00 mg/dL Final    Comment: Use of this assay is not recommended for patients undergoing treatment with eltrombopag due to the potential for falsely elevated results      eGFR 69  ml/min/1 73sq m Final    Narrative:     [place] guidelines for Chronic Kidney Disease (CKD):     Stage 1 with normal or high GFR (GFR > 90 mL/min/1 73 square meters)    Stage 2 Mild CKD (GFR = 60-89 mL/min/1 73 square meters)    Stage 3A Moderate CKD (GFR = 45-59 mL/min/1 73 square meters)    Stage 3B Moderate CKD (GFR = 30-44 mL/min/1 73 square meters)    Stage 4 Severe CKD (GFR = 15-29 mL/min/1 73 square meters)    Stage 5 End Stage CKD (GFR <15 mL/min/1 73 square meters)  Note: GFR calculation is accurate only with a steady state creatinine   PROTIME-INR - Normal    Protime 13 8  11 6 - 14 5 seconds Final    INR 1 06  0 84 - 1 19 Final   APTT - Normal    PTT 33  23 - 37 seconds Final    Comment: Therapeutic Heparin Range =  60-90 seconds   CBC AND DIFFERENTIAL    WBC 8 65  4 31 - 10 16 Thousand/uL Final    RBC 5 33  3 88 - 5 62 Million/uL Final    Hemoglobin 15 0  12 0 - 17 0 g/dL Final    Hematocrit 46 7  36 5 - 49 3 % Final    MCV 88  82 - 98 fL Final    MCH 28 1  26 8 - 34 3 pg Final    MCHC 32 1  31 4 - 37 4 g/dL Final    RDW 13 7  11 6 - 15 1 % Final    MPV 9 7  8 9 - 12 7 fL Final    Platelets 402  639 - 390 Thousands/uL Final    nRBC 0  /100 WBCs Final    Neutrophils Relative 74  43 - 75 % Final    Immat GRANS % 0  0 - 2 % Final    Lymphocytes Relative 17  14 - 44 % Final    Monocytes Relative 7  4 - 12 % Final    Eosinophils Relative 1  0 - 6 % Final    Basophils Relative 1  0 - 1 % Final    Neutrophils Absolute 6 47  1 85 - 7 62 Thousands/µL Final    Immature Grans Absolute 0 03  0 00 - 0 20 Thousand/uL Final    Lymphocytes Absolute 1 43  0 60 - 4 47 Thousands/µL Final    Monocytes Absolute 0 57  0 17 - 1 22 Thousand/µL Final    Eosinophils Absolute 0 10  0 00 - 0 61 Thousand/µL Final    Basophils Absolute 0 05  0 00 - 0 10 Thousands/µL Final   TYPE AND SCREEN    ABO Grouping A   Final    Rh Factor Positive   Final    Antibody Screen Negative   Final    Specimen Expiration Date [id number]   Final         No orders to display       MDM  Number of Diagnoses or Management Options  Rectal bleeding: new, needed workup  Diagnosis management comments: Unclear exact etiology internal hemorrhoid, colon cancer, less likely rapid upper GI bleed, patient was very tachycardic initially, eventually calmed down and his heart rate significantly decreased, explained to him the since this was only a very small amount of bleeding I do think it is less likely this is rapid upper GI bleed, patient was held in the emergency department for multiple hours had no bowel movements while he was here does not feel the urge to defecated all , explained to him that he needs to return immediately if he continues to have grossly bloody bowel movements, but for the few drops city had do not feel he needs emergent colonoscopy, is agreeable to follow-up with gastroenterology and PCP regarding outpatient screening colonoscopy  , will discharge       Amount and/or Complexity of Data Reviewed  Clinical lab tests: ordered and reviewed  Review and summarize past medical records: yes          Time reflects when diagnosis was documented in both MDM as applicable and the Disposition within this note     Time User Action Codes Description Comment    4/25/2022 10:05 AM No Kaminski Add [K62 5] Rectal bleeding       ED Disposition     ED Disposition Condition Date/Time Comment    Discharge Stable Mon Apr 25, 2022 10:05 AM Claudell Bras discharge to home/self care              Follow-up Information     Follow up With Specialties Details Why Contact Info Additional Nas[facility] Call in 1 day  [address] [id number] [phone] Lanette Jaramillo [facility] [place], [place] [address] [place], [place], [address] [facility]

## 2022-04-25 NOTE — DISCHARGE INSTRUCTIONS
Return to the emergency department if:   You feel dizzy or are too weak to stand  Your heart is beating faster than usual      You vomit blood, or your vomit looks like coffee grounds  You have blood in your bowel movement  You have abdominal pain or swelling

## 2022-04-26 LAB
ATRIAL RATE: 120 BPM
QRS AXIS: 56 DEGREES
QRSD INTERVAL: 66 MS
QT INTERVAL: 290 MS
QTC INTERVAL: 426 MS
T WAVE AXIS: 75 DEGREES
VENTRICULAR RATE: 130 BPM

## 2022-04-26 PROCEDURE — 93010 ELECTROCARDIOGRAM REPORT: CPT | Performed by: INTERNAL MEDICINE

## 2022-04-29 DIAGNOSIS — T78.40XS ALLERGIC DISORDER, SEQUELA: ICD-10-CM

## 2022-04-29 NOTE — TELEPHONE ENCOUNTER
Patient has not been seen in 1 year  He did schedule a follow up  Did you wish to refill prior to appointment?

## 2022-05-02 ENCOUNTER — RA CDI HCC (OUTPATIENT)
Dept: OTHER | Facility: HOSPITAL | Age: 67
End: 2022-05-02

## 2022-05-02 NOTE — PROGRESS NOTES
[address]  coding opportunities       Chart reviewed, no opportunity found: CHART REVIEWED, NO OPPORTUNITY FOUND        Patients Insurance     Medicare Insurance: Medicare

## 2022-05-03 RX ORDER — FLUTICASONE PROPIONATE 50 MCG
2 SPRAY, SUSPENSION (ML) NASAL DAILY
Qty: 16 ML | Refills: 0 | Status: SHIPPED | OUTPATIENT
Start: 2022-05-03 | End: 2022-05-09 | Stop reason: SDUPTHER

## 2022-05-09 ENCOUNTER — OFFICE VISIT (OUTPATIENT)
Dept: FAMILY MEDICINE CLINIC | Facility: CLINIC | Age: 67
End: 2022-05-09
Payer: MEDICARE

## 2022-05-09 VITALS
SYSTOLIC BLOOD PRESSURE: 142 MMHG | HEART RATE: 114 BPM | HEIGHT: 69 IN | DIASTOLIC BLOOD PRESSURE: 80 MMHG | WEIGHT: 184 LBS | OXYGEN SATURATION: 97 % | BODY MASS INDEX: 27.25 KG/M2 | RESPIRATION RATE: 16 BRPM

## 2022-05-09 DIAGNOSIS — Z11.59 NEED FOR HEPATITIS C SCREENING TEST: ICD-10-CM

## 2022-05-09 DIAGNOSIS — E78.2 MIXED HYPERLIPIDEMIA: ICD-10-CM

## 2022-05-09 DIAGNOSIS — T78.40XS ALLERGIC DISORDER, SEQUELA: ICD-10-CM

## 2022-05-09 DIAGNOSIS — R73.09 ELEVATED GLUCOSE: ICD-10-CM

## 2022-05-09 DIAGNOSIS — Z00.00 MEDICARE ANNUAL WELLNESS VISIT, SUBSEQUENT: Primary | ICD-10-CM

## 2022-05-09 DIAGNOSIS — J30.9 ALLERGIC RHINITIS, UNSPECIFIED SEASONALITY, UNSPECIFIED TRIGGER: ICD-10-CM

## 2022-05-09 DIAGNOSIS — J45.20 MILD INTERMITTENT ASTHMA WITHOUT COMPLICATION: ICD-10-CM

## 2022-05-09 PROCEDURE — 1123F ACP DISCUSS/DSCN MKR DOCD: CPT | Performed by: FAMILY MEDICINE

## 2022-05-09 PROCEDURE — 99214 OFFICE O/P EST MOD 30 MIN: CPT | Performed by: FAMILY MEDICINE

## 2022-05-09 PROCEDURE — G0439 PPPS, SUBSEQ VISIT: HCPCS | Performed by: FAMILY MEDICINE

## 2022-05-09 RX ORDER — ALBUTEROL SULFATE 90 UG/1
2 AEROSOL, METERED RESPIRATORY (INHALATION) EVERY 6 HOURS PRN
Qty: 8.5 G | Refills: 3 | Status: SHIPPED | OUTPATIENT
Start: 2022-05-09

## 2022-05-09 RX ORDER — FLUTICASONE PROPIONATE 50 MCG
2 SPRAY, SUSPENSION (ML) NASAL DAILY
Qty: 16 ML | Refills: 5 | Status: SHIPPED | OUTPATIENT
Start: 2022-05-09

## 2022-05-09 NOTE — PROGRESS NOTES
Assessment/Plan:    Problem List Items Addressed This Visit        Respiratory    Asthma, mild intermittent     Asthma is mild intermittent and refill is given for inhaler         Relevant Medications    albuterol (PROVENTIL HFA,VENTOLIN HFA) 90 mcg/act inhaler    Allergic rhinitis     He use Flonase daily, needs a refill which is sent            Other    Allergic    Relevant Medications    fluticasone (FLONASE) 50 mcg/act nasal spray    Elevated glucose     Her ER record is reviewed her blood sugar was borderline high will check labs again up to 3-4 months with A1c         Relevant Orders    CBC and differential    Comprehensive metabolic panel    Hemoglobin A1C    Hyperlipidemia     He says he follow a neurologist in [place] and he got his blood work and was told his cholesterol is high as last week he will send the reports to our office, he started eating low-fat diet, he says in the past he was given some medication for high cholesterol and he got side effects         Relevant Orders    Lipid panel    TSH, 3rd generation    Medicare annual wellness visit, subsequent - Primary    Need for hepatitis C screening test    Relevant Orders    Hepatitis C antibody      Pneumovax was offered, he says he will not get it today because he is planning to get some more injections tomorrow from neurologist around his neck area and he will wait till next visit to get the pneumonia vaccine    He is anxious and he says to coming to the doctor's office was making him anxious as he was not seen for long time that is why his blood pressure was elevated and heart rate was up    Return in about 3 months (around 8/9/2022) for Recheck  Chief Complaint   Patient presents with    Medicare Wellness Visit    Follow-up     Depression Screening and Follow-up Plan: Patient was screened for depression during today's encounter  They screened negative with a PHQ-2 score of 0  Subjective:   Patient ID: Clifton Henao is a 73 y o  male     He says he follows neurologist in [place]  because of his chronic tingling numbness and neck pain, and he did some blood work in [place] which was high in cholesterol, he will send us the reports  He says tomorrow is going there to get some local injections to reduce the pain  He was also seen in the ER at [address] [place] [address] few weeks ago for rectal bleeding and later on he had colonoscopy which revealed 2 polyps and diverticulosis      Review of Systems   Constitutional: Negative for activity change, appetite change, chills, fatigue, fever and unexpected weight change  HENT: Negative for congestion, ear discharge, ear pain, nosebleeds, postnasal drip, rhinorrhea, sinus pressure, sneezing, sore throat, trouble swallowing and voice change  Eyes: Negative for photophobia, pain, discharge, redness and itching  Respiratory: Negative for cough, chest tightness, shortness of breath and wheezing  Cardiovascular: Negative for chest pain, palpitations and leg swelling  Gastrointestinal: Negative for abdominal pain, constipation, diarrhea, nausea and vomiting  Endocrine: Negative for polyuria  Genitourinary: Negative for dysuria, frequency and urgency  Musculoskeletal: Negative for arthralgias, back pain, myalgias and neck pain  Skin: Negative for color change, pallor and rash  Allergic/Immunologic: Negative for environmental allergies and food allergies  Neurological: Negative for dizziness, weakness, light-headedness and headaches  Hematological: Negative for adenopathy  Does not bruise/bleed easily  Psychiatric/Behavioral: Negative for behavioral problems  The patient is not nervous/anxious  Objective:  Physical Exam  Vitals and nursing note reviewed  Constitutional:       Appearance: He is well-developed  HENT:      Head: Normocephalic and atraumatic  Right Ear: External ear normal       Left Ear: External ear normal    Eyes:      General: No scleral icterus          Right eye: No discharge  Left eye: No discharge  Conjunctiva/sclera: Conjunctivae normal       Pupils: Pupils are equal, round, and reactive to light  Neck:      Thyroid: No thyromegaly  Trachea: No tracheal deviation  Cardiovascular:      Rate and Rhythm: Normal rate and regular rhythm  Heart sounds: Normal heart sounds  No murmur heard  Pulmonary:      Effort: Pulmonary effort is normal  No respiratory distress  Breath sounds: Normal breath sounds  No wheezing or rales  Abdominal:      General: Bowel sounds are normal  There is no distension  Palpations: Abdomen is soft  There is no mass  Tenderness: There is no abdominal tenderness  There is no rebound  Musculoskeletal:         General: Normal range of motion  Cervical back: Normal range of motion and neck supple  Right lower leg: No edema  Left lower leg: No edema  Lymphadenopathy:      Cervical: No cervical adenopathy  Skin:     General: Skin is warm  Coloration: Skin is not pale  Findings: No erythema or rash  Neurological:      Mental Status: He is alert and oriented to person, place, and time  Cranial Nerves: No cranial nerve deficit  Deep Tendon Reflexes: Reflexes are normal and symmetric  Psychiatric:         Behavior: Behavior normal          Thought Content:  Thought content normal          Judgment: Judgment normal             Past Surgical History:   Procedure Laterality Date    KIDNEY STONE SURGERY      pt poor historian       Family History   Problem Relation Age of Onset    No Known Problems Mother          Current Outpatient Medications:     albuterol (PROVENTIL HFA,VENTOLIN HFA) 90 mcg/act inhaler, Inhale 2 puffs every 6 (six) hours as needed for wheezing, Disp: 8 5 g, Rfl: 3    clonazePAM (KlonoPIN) 0 5 MG disintegrating tablet, DISSOLVE 1 TABLET BY MOUTH EVERY DAY, Disp: , Rfl:     clonazePAM (KlonoPIN) 0 5 mg tablet, Take by mouth 4 (four) times a day as needed, Disp: , Rfl:     clotrimazole (LOTRIMIN) 1 % cream, Apply to affected area 2 times daily, Disp: 28 g, Rfl: 0    [employer] ALLERGY 25 MG capsule, Take 1 capsule (25 mg total) by mouth daily at bedtime, Disp: 30 capsule, Rfl: 1    cyclobenzaprine (FLEXERIL) 10 mg tablet, Take 10 mg by mouth 3 (three) times a day as needed for muscle spasms, Disp: , Rfl:     diclofenac sodium (VOLTAREN) 1 %, APPLY TO AFFECTED AREA EVERY DAY, Disp: , Rfl:     diphenhydrAMINE (BENADRYL) 25 mg tablet, Take 1 tablet (25 mg total) by mouth every 6 (six) hours as needed for itching (Breakthrough Itching), Disp: 30 tablet, Rfl: 0    EPINEPHrine (EPIPEN 2-ARUN) 0 3 mg/0 3 mL SOAJ, Use in case of anaphylaxis reaction, Disp: 1 each, Rfl: 1    fexofenadine (ALLEGRA) 180 MG tablet, Take 1 tablet (180 mg total) by mouth 2 (two) times a day, Disp: 60 tablet, Rfl: 1    fluticasone (FLONASE) 50 mcg/act nasal spray, 2 sprays into each nostril daily, Disp: 16 mL, Rfl: 5    ibuprofen (MOTRIN) 200 mg tablet, Take 200 mg by mouth every 6 (six) hours as needed for mild pain , Disp: , Rfl:     lidocaine (LIDODERM) 5 %, Place 1 patch on the skin every 24 hours Remove & Discard patch within 12 hours or as directed by MD, Disp: , Rfl:     Menthol, Topical Analgesic, (Flexall 454) 16 % GEL, Apply topically  , Disp: , Rfl:     olopatadine (PATANOL) 0 1 % ophthalmic solution, Administer 1 drop to both eyes 2 (two) times a day, Disp: 5 mL, Rfl: 0    triamcinolone (KENALOG) 0 1 % cream, Apply topically 2 (two) times a day, Disp: 454 g, Rfl: 1    zolpidem (AMBIEN) 10 mg tablet, , Disp: , Rfl:     Allergies   Allergen Reactions    Levofloxacin Hives    Penicillins Hives     Reaction Date: [id number];        Vitals:    05/09/22 1053 05/09/22 1113 05/09/22 1120   BP: 154/80 142/80    Pulse: (!) 123  (!) 114   Resp: 16     SpO2: 97%     Weight: 83 5 kg (184 lb)     Height: 5' 8 5" (1 74 m)

## 2022-05-09 NOTE — PATIENT INSTRUCTIONS

## 2022-05-09 NOTE — PROGRESS NOTES
Assessment and Plan:     Problem List Items Addressed This Visit        Respiratory    Asthma, mild intermittent    Relevant Medications    albuterol (PROVENTIL HFA,VENTOLIN HFA) 90 mcg/act inhaler    Allergic rhinitis       Other    Allergic    Relevant Medications    fluticasone (FLONASE) 50 mcg/act nasal spray    Elevated glucose    Relevant Orders    CBC and differential    Comprehensive metabolic panel    Hemoglobin A1C    Hyperlipidemia    Relevant Orders    Lipid panel    TSH, 3rd generation    Medicare annual wellness visit, subsequent - Primary    Need for hepatitis C screening test    Relevant Orders    Hepatitis C antibody        BMI Counseling: Body mass index is 27 57 kg/m²  The BMI is above normal  Nutrition recommendations include decreasing portion sizes, limiting drinks that contain sugar, moderation in carbohydrate intake and reducing intake of cholesterol  Exercise recommendations include exercising 3-5 times per week  Rationale for BMI follow-up plan is due to patient being overweight or obese  Depression Screening and Follow-up Plan: Patient was screened for depression during today's encounter  They screened negative with a PHQ-2 score of 0  Preventive health issues were discussed with patient, and age appropriate screening tests were ordered as noted in patient's After Visit Summary  Personalized health advice and appropriate referrals for health education or preventive services given if needed, as noted in patient's After Visit Summary       History of Present Illness:     Patient presents for Medicare Annual Wellness visit    Patient Care Team:  Balwinder Navarrete MD as PCP - General     Problem List:     Patient Active Problem List   Diagnosis    Asthma, mild intermittent    Allergic    Elevated glucose    Allergic rhinitis    Allergic urticaria    Hyperlipidemia    Tinea cruris    Allergic contact dermatitis due to plants, except food    Medicare annual wellness visit, subsequent    Anxiety    Urticaria    Need for hepatitis C screening test      Past Medical and Surgical History:     Past Medical History:   Diagnosis Date    Allergic rhinitis 01/13/2011    Anxiety     Asthma     Insomnia     Kidney stone     Myalgia and Myositis 11/13/2007    Pain     chronic neck,back, spine    Psychiatric disorder     Temporomandibular joint disorder 01/06/2009     Past Surgical History:   Procedure Laterality Date    KIDNEY STONE SURGERY      pt poor historian      Family History:     Family History   Problem Relation Age of Onset    No Known Problems Mother       Social History:     Social History     Socioeconomic History    Marital status: /Civil Union     Spouse name: None    Number of children: 1    Years of education: None    Highest education level: None   Occupational History    Occupation: On disability   Tobacco Use    Smoking status: Never Smoker    Smokeless tobacco: Never Used   Substance and Sexual Activity    Alcohol use: No    Drug use: No    Sexual activity: None   Other Topics Concern    None   Social History Narrative    None     Social Determinants of Health     Financial Resource Strain: Not on file   Food Insecurity: Not on file   Transportation Needs: Not on file   Physical Activity: Not on file   Stress: Not on file   Social Connections: Not on file   Intimate Partner Violence: Not on file   Housing Stability: Not on file      Medications and Allergies:     Current Outpatient Medications   Medication Sig Dispense Refill    albuterol (PROVENTIL HFA,VENTOLIN HFA) 90 mcg/act inhaler Inhale 2 puffs every 6 (six) hours as needed for wheezing 8 5 g 3    clonazePAM (KlonoPIN) 0 5 MG disintegrating tablet DISSOLVE 1 TABLET BY MOUTH EVERY DAY      clonazePAM (KlonoPIN) 0 5 mg tablet Take by mouth 4 (four) times a day as needed      clotrimazole (LOTRIMIN) 1 % cream Apply to affected area 2 times daily 28 g 0    [facility] ALLERGY 25 MG capsule Take 1 capsule (25 mg total) by mouth daily at bedtime 30 capsule 1    cyclobenzaprine (FLEXERIL) 10 mg tablet Take 10 mg by mouth 3 (three) times a day as needed for muscle spasms      diclofenac sodium (VOLTAREN) 1 % APPLY TO AFFECTED AREA EVERY DAY      diphenhydrAMINE (BENADRYL) 25 mg tablet Take 1 tablet (25 mg total) by mouth every 6 (six) hours as needed for itching (Breakthrough Itching) 30 tablet 0    EPINEPHrine (EPIPEN 2-ARUN) 0 3 mg/0 3 mL SOAJ Use in case of anaphylaxis reaction 1 each 1    fexofenadine (ALLEGRA) 180 MG tablet Take 1 tablet (180 mg total) by mouth 2 (two) times a day 60 tablet 1    fluticasone (FLONASE) 50 mcg/act nasal spray 2 sprays into each nostril daily 16 mL 5    ibuprofen (MOTRIN) 200 mg tablet Take 200 mg by mouth every 6 (six) hours as needed for mild pain       lidocaine (LIDODERM) 5 % Place 1 patch on the skin every 24 hours Remove & Discard patch within 12 hours or as directed by MD      Menthol, Topical Analgesic, (Flexall 454) 16 % GEL Apply topically   olopatadine (PATANOL) 0 1 % ophthalmic solution Administer 1 drop to both eyes 2 (two) times a day 5 mL 0    triamcinolone (KENALOG) 0 1 % cream Apply topically 2 (two) times a day 454 g 1    zolpidem (AMBIEN) 10 mg tablet        No current facility-administered medications for this visit       Allergies   Allergen Reactions    Levofloxacin Hives    Penicillins Hives     Reaction Date: [id number];       Immunizations:     Immunization History   Administered Date(s) Administered    COVID-19 PFIZER VACCINE 0 3 ML IM 04/03/2021, 04/24/2021    COVID-19 Pfizer vac (Ez-sucrose, gray cap) 12 yr+ IM 01/30/2022    INFLUENZA 09/27/2018    Influenza Injectable, MDCK, Preservative Free, Quadrivalent, 0 5 mL 09/30/2019    Influenza, injectable, quadrivalent, preservative free 0 5 mL 09/04/2020      Health Maintenance:         Topic Date Due    Hepatitis C Screening  Never done    Colorectal Cancer Screening  Never done    HIV Screening  02/05/2023 (Originally 8/31/1970)         Topic Date Due    Pneumococcal Vaccine: 65+ Years (1 of 2 - PPSV23) Never done      Medicare Health Risk Assessment:     /80   Pulse (!) 114   Resp 16   Ht 5' 8 5" (1 74 m)   Wt 83 5 kg (184 lb)   SpO2 97%   BMI 27 57 kg/m²      Selin Sahu is here for his Subsequent Wellness visit  Health Risk Assessment:   Patient rates overall health as good  Patient feels that their physical health rating is same  Patient is satisfied with their life  Eyesight was rated as same  Hearing was rated as same  Patient feels that their emotional and mental health rating is same  Patients states they are never, rarely angry  Patient states they are never, rarely unusually tired/fatigued  Pain experienced in the last 7 days has been some  Patient's pain rating has been 8/10  Depression Screening:   PHQ-2 Score: 0      Fall Risk Screening: In the past year, patient has experienced: no history of falling in past year      Home Safety:  Patient does not have trouble with stairs inside or outside of their home  Patient has working smoke alarms and has working carbon monoxide detector  Home safety hazards include: none  Nutrition:   Current diet is Regular  Medications:   Patient is currently taking over-the-counter supplements  OTC medications include: see medication list  Patient is able to manage medications  Activities of Daily Living (ADLs)/Instrumental Activities of Daily Living (IADLs):   Walk and transfer into and out of bed and chair?: Yes  Dress and groom yourself?: Yes    Bathe or shower yourself?: Yes    Feed yourself?  Yes  Do your laundry/housekeeping?: Yes  Manage your money, pay your bills and track your expenses?: Yes  Make your own meals?: Yes    Do your own shopping?: Yes    Advance Care Planning:   Living will: No    Durable POA for healthcare: No    Advanced directive: No      Cognitive Screening:   Provider or family/friend/caregiver concerned regarding cognition?: Yes    PREVENTIVE SCREENINGS      Cardiovascular Screening:    General: Screening Not Indicated and History Lipid Disorder      Diabetes Screening:     General: Screening Current      Colorectal Cancer Screening:     General: Screening Current      Prostate Cancer Screening:    General: Screening Not Indicated      Abdominal Aortic Aneurysm (AAA) Screening:    Risk factors include: age between 73-67 yo        Lung Cancer Screening:     General: Screening Not Indicated    Screening, Brief Intervention, and Referral to Treatment (SBIRT)    Screening  Typical number of drinks in a day: 0  Typical number of drinks in a week: 0  Interpretation: Low risk drinking behavior      AUDIT-C Screenin) How often did you have a drink containing alcohol in the past year? never  2) How many drinks did you have on a typical day when you were drinking in the past year? 0  3) How often did you have 6 or more drinks on one occasion in the past year? never    AUDIT-C Score: 0  Interpretation: Score 0-3 (male): Negative screen for alcohol misuse    Single Item Drug Screening:  How often have you used an illegal drug (including marijuana) or a prescription medication for non-medical reasons in the past year? never    Single Item Drug Screen Score: 0  Interpretation: Negative screen for possible drug use disorder      Mariajose Becerra MD

## 2022-05-09 NOTE — ASSESSMENT & PLAN NOTE
He says he follow a neurologist in [place] and he got his blood work and was told his cholesterol is high as last week he will send the reports to our office, he started eating low-fat diet, he says in the past he was given some medication for high cholesterol and he got side effects

## 2022-05-09 NOTE — ASSESSMENT & PLAN NOTE
Her ER record is reviewed her blood sugar was borderline high will check labs again up to 3-4 months with A1c

## 2022-06-15 ENCOUNTER — TELEPHONE (OUTPATIENT)
Dept: FAMILY MEDICINE CLINIC | Facility: CLINIC | Age: 67
End: 2022-06-15

## 2022-06-15 ENCOUNTER — HOSPITAL ENCOUNTER (EMERGENCY)
Facility: HOSPITAL | Age: 67
Discharge: HOME | End: 2022-06-15
Attending: EMERGENCY MEDICINE
Payer: MEDICARE

## 2022-06-15 VITALS
OXYGEN SATURATION: 98 % | SYSTOLIC BLOOD PRESSURE: 187 MMHG | TEMPERATURE: 98.2 F | RESPIRATION RATE: 19 BRPM | HEART RATE: 99 BPM | DIASTOLIC BLOOD PRESSURE: 109 MMHG
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
## 2022-06-15 PROCEDURE — 99281 EMR DPT VST MAYX REQ PHY/QHP: CPT

## 2022-06-15 PROCEDURE — 99284 EMERGENCY DEPT VISIT MOD MDM: CPT

## 2022-06-15 RX ORDER — PREDNISONE 20 MG/1
40 TABLET ORAL ONCE
Status: COMPLETED | OUTPATIENT
Start: 2022-06-15 | End: 2022-06-15

## 2022-06-15 RX ORDER — FEXOFENADINE HYDROCHLORIDE 60 MG/1
60 TABLET, FILM COATED ORAL 2 TIMES DAILY
Qty: 20 TABLET | Refills: 0 | Status: SHIPPED | OUTPATIENT
Start: 2022-06-15

## 2022-06-15 RX ORDER — FAMOTIDINE 20 MG/1
20 TABLET, FILM COATED ORAL ONCE
Status: COMPLETED | OUTPATIENT
Start: 2022-06-15 | End: 2022-06-15

## 2022-06-15 RX ORDER — PREDNISONE 20 MG/1
40 TABLET ORAL DAILY
Qty: 15 TABLET | Refills: 0 | Status: SHIPPED | OUTPATIENT
Start: 2022-06-15 | End: 2022-06-18

## 2022-06-15 RX ORDER — LORATADINE 10 MG/1
10 TABLET ORAL ONCE
Status: COMPLETED | OUTPATIENT
Start: 2022-06-15 | End: 2022-06-15

## 2022-06-15 RX ADMIN — LORATADINE 10 MG: 10 TABLET ORAL at 13:22

## 2022-06-15 RX ADMIN — FAMOTIDINE 20 MG: 20 TABLET ORAL at 13:22

## 2022-06-15 RX ADMIN — PREDNISONE 40 MG: 20 TABLET ORAL at 13:22

## 2022-06-15 NOTE — Clinical Note
Khang Osborne was seen and treated in our emergency department on 6/15/2022  No restrictions            Diagnosis:     Grayson Wilson    He may return on this date: If you have any questions or concerns, please don't hesitate to call        Azucena Avitia PA-C    ______________________________           _______________          _______________  Hospital Representative                              Date                                Time

## 2022-06-15 NOTE — TELEPHONE ENCOUNTER
Patient called and stated he was working in his garage and noticed some itching which in turn his arm and leg are covered in hives and he put cream on it so it doesn't get worse  Patient will proceed to the urgent care or ER to have it checked out

## 2022-06-15 NOTE — ED PROVIDER NOTES
History  Chief Complaint   Patient presents with    Insect Bite     Pt got bit by an insect in his garage approx an hour ago and has hives on upper thigh, b/l upper arms and chest  Pt states he has had reactions like this before from insects, airway is patent and pt does not report any trouble breathing     Patient is a 61-year-old male coming in for complaint of a rash, that started approximately hour prior to arrival   Patient states he has had reactions like this from insects in the past   Patient has no trouble breathing at this time  Review of patient's chart reveals that he has been seen numerous times for similar complaints, but no sign of anaphylaxis  Patient does not follow-up with dermatology or Rheumatology as recommended  Patient did not take anything prior to arrival      History provided by:  Patient   used: No    Insect Bite  Location:  Shoulder/arm  Associated symptoms: rash    Associated symptoms: no numbness and no swelling        Prior to Admission Medications   Prescriptions Last Dose Informant Patient Reported? Taking? [facility] ALLERGY 25 MG capsule   No No   Sig: Take 1 capsule (25 mg total) by mouth daily at bedtime   EPINEPHrine (EPIPEN 2-ARUN) 0 3 mg/0 3 mL SOAJ   No No   Sig: Use in case of anaphylaxis reaction   Menthol, Topical Analgesic, (Flexall 454) 16 % GEL   Yes No   Sig: Apply topically     albuterol (PROVENTIL HFA,VENTOLIN HFA) 90 mcg/act inhaler   No No   Sig: Inhale 2 puffs every 6 (six) hours as needed for wheezing   clonazePAM (KlonoPIN) 0 5 MG disintegrating tablet   Yes No   Sig: DISSOLVE 1 TABLET BY MOUTH EVERY DAY   clonazePAM (KlonoPIN) 0 5 mg tablet   Yes No   Sig: Take by mouth 4 (four) times a day as needed   clotrimazole (LOTRIMIN) 1 % cream   No No   Sig: Apply to affected area 2 times daily   cyclobenzaprine (FLEXERIL) 10 mg tablet   Yes No   Sig: Take 10 mg by mouth 3 (three) times a day as needed for muscle spasms   diclofenac sodium (VOLTAREN) 1 %   Yes No   Sig: APPLY TO AFFECTED AREA EVERY DAY   diphenhydrAMINE (BENADRYL) 25 mg tablet   No No   Sig: Take 1 tablet (25 mg total) by mouth every 6 (six) hours as needed for itching (Breakthrough Itching)   fexofenadine (ALLEGRA) 180 MG tablet   No No   Sig: Take 1 tablet (180 mg total) by mouth 2 (two) times a day   fluticasone (FLONASE) 50 mcg/act nasal spray   No No   Si sprays into each nostril daily   ibuprofen (MOTRIN) 200 mg tablet   Yes No   Sig: Take 200 mg by mouth every 6 (six) hours as needed for mild pain    lidocaine (LIDODERM) 5 %   Yes No   Sig: Place 1 patch on the skin every 24 hours Remove & Discard patch within 12 hours or as directed by MD   olopatadine (PATANOL) 0 1 % ophthalmic solution   No No   Sig: Administer 1 drop to both eyes 2 (two) times a day   triamcinolone (KENALOG) 0 1 % cream   No No   Sig: Apply topically 2 (two) times a day   zolpidem (AMBIEN) 10 mg tablet   Yes No      Facility-Administered Medications: None       Past Medical History:   Diagnosis Date    Allergic rhinitis 2011    Anxiety     Asthma     Insomnia     Kidney stone     Myalgia and Myositis 2007    Pain     chronic neck,back, spine    Psychiatric disorder     Temporomandibular joint disorder 2009       Past Surgical History:   Procedure Laterality Date    KIDNEY STONE SURGERY      pt poor historian       Family History   Problem Relation Age of Onset    No Known Problems Mother      I have reviewed and agree with the history as documented  E-Cigarette/Vaping     E-Cigarette/Vaping Substances     Social History     Tobacco Use    Smoking status: Never Smoker    Smokeless tobacco: Never Used   Substance Use Topics    Alcohol use: No    Drug use: No       Review of Systems   Constitutional: Negative  Negative for activity change and appetite change  HENT: Negative  Eyes: Negative  Respiratory: Negative  Negative for chest tightness and shortness of breath  Cardiovascular: Negative  Negative for chest pain  Gastrointestinal: Negative  Genitourinary: Negative  Musculoskeletal: Negative  Skin: Positive for rash  Neurological: Negative  Negative for numbness  Psychiatric/Behavioral: Negative  Physical Exam  Physical Exam  Vitals reviewed  Constitutional:       Appearance: Normal appearance  He is normal weight  HENT:      Head: Normocephalic and atraumatic  Right Ear: External ear normal       Left Ear: External ear normal       Nose: Nose normal    Eyes:      Conjunctiva/sclera: Conjunctivae normal    Cardiovascular:      Rate and Rhythm: Normal rate  Pulmonary:      Effort: Pulmonary effort is normal    Musculoskeletal:         General: Normal range of motion  Cervical back: Normal range of motion  Skin:     General: Skin is warm and dry  Findings: Rash present  Comments: Patient does appear to have a small rash on his back as well as under his left armpit  Appears to be more folliculitis in origin, than true hives  Patient is no trouble breathing, and talks in full sentences   Neurological:      Mental Status: He is alert           Vital Signs  ED Triage Vitals [06/15/22 1239]   Temperature Pulse Respirations Blood Pressure SpO2   98 2 °F (36 8 °C) 99 19 (!) 187/109 98 %      Temp Source Heart Rate Source Patient Position - Orthostatic VS BP Location FiO2 (%)   Oral Monitor Sitting Right arm --      Pain Score       --           Vitals:    06/15/22 1239   BP: (!) 187/109   Pulse: 99   Patient Position - Orthostatic VS: Sitting         Visual Acuity      ED Medications  Medications   loratadine (CLARITIN) tablet 10 mg (10 mg Oral Given 6/15/22 1322)   famotidine (PEPCID) tablet 20 mg (20 mg Oral Given 6/15/22 1322)   predniSONE tablet 40 mg (40 mg Oral Given 6/15/22 1322)       Diagnostic Studies  Results Reviewed     None                 No orders to display              Procedures  Procedures         ED Course MDM  Number of Diagnoses or Management Options  Insect bite, unspecified site, initial encounter: new and does not require workup  Diagnosis management comments: Patient is in no acute distress at this time  Patient was started on Pepcid, Claritin, and prednisone  Patient was able to talk in full sentences throughout his entire stay here, and was able to ambulate out of the emergency department with no issues    Counseling: I had a detailed discussion with the patient and/or guardian regarding: the historical points, exam findings, and any diagnostic results supporting the discharge diagnosis, lab results, radiology results, discharge instructions reviewed with patient and/or family/caregiver and understanding was verbalized  Instructions given to return to the emergency department if symptoms worsen or persist, or if there are any questions or concerns that arise at home       All labs reviewed and utilized in the medical decision making process     All radiology studies independently viewed by me and interpreted by the radiologist     Portions of the record may have been created with voice recognition software   Occasional wrong word or "sound a like" substitutions may have occurred due to the inherent limitations of voice recognition software   Read the chart carefully and recognize, using context, where substitutions have occurred  Risk of Complications, Morbidity, and/or Mortality  Presenting problems: minimal  Diagnostic procedures: minimal  Management options: minimal    Patient Progress  Patient progress: stable      Disposition  Final diagnoses:   Insect bite, unspecified site, initial encounter     Time reflects when diagnosis was documented in both MDM as applicable and the Disposition within this note     Time User Action Codes Description Comment    6/15/2022  1:11 PM Alyssa Hurley  XXXA] Insect bite, unspecified site, initial encounter ED Disposition     ED Disposition   Discharge    Condition   Stable    Date/Time   Wed Francis 15, 2022  1:11 PM    Comment   [address]ley Route discharge to home/self care  Follow-up Information     Follow up With Specialties Details Why Contact Info Additional Information    Germán Marcial MD Family Medicine   [address]  [place] [phone]       [address] Emergency Department Emergency Medicine  As needed, If symptoms worsen [address] [facility] [address] [facility] Emergency Department, [address], [place], [place], [address]          Discharge Medication List as of 6/15/2022  1:12 PM      START taking these medications    Details   !! fexofenadine (ALLEGRA) 60 MG tablet Take 1 tablet (60 mg total) by mouth 2 (two) times a day, Starting Wed 6/15/2022, Normal      predniSONE 20 mg tablet Take 2 tablets (40 mg total) by mouth daily for 3 days, Starting Wed 6/15/2022, Until Sat 6/18/2022, Normal       !! - Potential duplicate medications found  Please discuss with provider        CONTINUE these medications which have NOT CHANGED    Details   albuterol (PROVENTIL HFA,VENTOLIN HFA) 90 mcg/act inhaler Inhale 2 puffs every 6 (six) hours as needed for wheezing, Starting Mon 5/9/2022, Normal      clonazePAM (KlonoPIN) 0 5 MG disintegrating tablet DISSOLVE 1 TABLET BY MOUTH EVERY DAY, Historical Med      clonazePAM (KlonoPIN) 0 5 mg tablet Take by mouth 4 (four) times a day as needed, Starting Mon 12/19/2011, Historical Med      clotrimazole (LOTRIMIN) 1 % cream Apply to affected area 2 times daily, Print      [facility] ALLERGY 25 MG capsule Take 1 capsule (25 mg total) by mouth daily at bedtime, Starting Mon 2/26/2018, Normal      cyclobenzaprine (FLEXERIL) 10 mg tablet Take 10 mg by mouth 3 (three) times a day as needed for muscle spasms, Historical Med      diclofenac sodium (VOLTAREN) 1 % APPLY TO AFFECTED AREA EVERY DAY, Historical Med      diphenhydrAMINE (BENADRYL) 25 mg tablet Take 1 tablet (25 mg total) by mouth every 6 (six) hours as needed for itching (Breakthrough Itching), Starting Mon 3/2/2020, Normal      EPINEPHrine (EPIPEN 2-ARUN) 0 3 mg/0 3 mL SOAJ Use in case of anaphylaxis reaction, Normal      !! fexofenadine (ALLEGRA) 180 MG tablet Take 1 tablet (180 mg total) by mouth 2 (two) times a day, Starting Thu 2/4/2021, Normal      fluticasone (FLONASE) 50 mcg/act nasal spray 2 sprays into each nostril daily, Starting Mon 5/9/2022, Normal      ibuprofen (MOTRIN) 200 mg tablet Take 200 mg by mouth every 6 (six) hours as needed for mild pain , Starting Mon 10/23/2017, Historical Med      lidocaine (LIDODERM) 5 % Place 1 patch on the skin every 24 hours Remove & Discard patch within 12 hours or as directed by MD, Until Discontinued, Historical Med      Menthol, Topical Analgesic, (Flexall 454) 16 % GEL Apply topically  , Until Discontinued, Historical Med      olopatadine (PATANOL) 0 1 % ophthalmic solution Administer 1 drop to both eyes 2 (two) times a day, Starting Sun 6/9/2019, Print      triamcinolone (KENALOG) 0 1 % cream Apply topically 2 (two) times a day, Starting Thu 2/4/2021, Normal      zolpidem (AMBIEN) 10 mg tablet Starting Sun 2/25/2018, Historical Med       !! - Potential duplicate medications found  Please discuss with provider  No discharge procedures on file      PDMP Review     None          ED Provider  Electronically Signed by           Blossom Bernal PA-C  06/15/22 3053

## 2022-07-15 NOTE — TELEPHONE ENCOUNTER
Due to pandemic all out reach to patients for well visits will be put on hold until further notice 
alexis w/ patient about scheduling his yearly physical  He will cb to schedule  He is currently out of town 
alexis w/ patient, he informed me that he just got back from [place] due to a death in the family    He is still coping with this and will call us back when he is ready   
Consent: The patient's consent was obtained including but not limited to risks of crusting, scabbing, blistering, scarring, darker or lighter pigmentary change, recurrence, incomplete removal and infection.
Post-Care Instructions: I reviewed with the patient in detail post-care instructions. Patient is to wear sunprotection, and avoid picking at any of the treated lesions. Pt may apply Vaseline to crusted or scabbing areas.
Number Of Freeze-Thaw Cycles: 3 freeze-thaw cycles
Render Post-Care Instructions In Note?: no
Detail Level: Detailed
Duration Of Freeze Thaw-Cycle (Seconds): 2
Show Aperture Variable?: Yes
Application Tool (Optional): Liquid Nitrogen Sprayer

## 2022-09-10 LAB
ALBUMIN SERPL-MCNC: 4.4 G/DL (ref 3.6–5.1)
ALBUMIN/GLOB SERPL: 1.5 (CALC) (ref 1–2.5)
ALP SERPL-CCNC: 83 U/L (ref 35–144)
ALT SERPL-CCNC: 18 U/L (ref 9–46)
AST SERPL-CCNC: 16 U/L (ref 10–35)
BASOPHILS # BLD AUTO: 47 CELLS/UL (ref 0–200)
BASOPHILS NFR BLD AUTO: 0.6 %
BILIRUB SERPL-MCNC: 0.6 MG/DL (ref 0.2–1.2)
BUN SERPL-MCNC: 12 MG/DL (ref 7–25)
BUN/CREAT SERPL: ABNORMAL (CALC) (ref 6–22)
CALCIUM SERPL-MCNC: 9.8 MG/DL (ref 8.6–10.3)
CHLORIDE SERPL-SCNC: 105 MMOL/L (ref 98–110)
CHOLEST SERPL-MCNC: 239 MG/DL
CHOLEST/HDLC SERPL: 5.2 (CALC)
CO2 SERPL-SCNC: 28 MMOL/L (ref 20–32)
CREAT SERPL-MCNC: 0.86 MG/DL (ref 0.7–1.35)
EOSINOPHIL # BLD AUTO: 94 CELLS/UL (ref 15–500)
EOSINOPHIL NFR BLD AUTO: 1.2 %
ERYTHROCYTE [DISTWIDTH] IN BLOOD BY AUTOMATED COUNT: 13.1 % (ref 11–15)
GFR/BSA.PRED SERPLBLD CYS-BASED-ARV: 95 ML/MIN/1.73M2
GLOBULIN SER CALC-MCNC: 3 G/DL (CALC) (ref 1.9–3.7)
GLUCOSE SERPL-MCNC: 116 MG/DL (ref 65–99)
HBA1C MFR BLD: 6.1 % OF TOTAL HGB
HCT VFR BLD AUTO: 48.2 % (ref 38.5–50)
HCV AB S/CO SERPL IA: 0.08
HCV AB SERPL QL IA: NORMAL
HDLC SERPL-MCNC: 46 MG/DL
HGB BLD-MCNC: 15.9 G/DL (ref 13.2–17.1)
LDLC SERPL CALC-MCNC: 167 MG/DL (CALC)
LYMPHOCYTES # BLD AUTO: 1771 CELLS/UL (ref 850–3900)
LYMPHOCYTES NFR BLD AUTO: 22.7 %
MCH RBC QN AUTO: 26.9 PG (ref 27–33)
MCHC RBC AUTO-ENTMCNC: 33 G/DL (ref 32–36)
MCV RBC AUTO: 81.7 FL (ref 80–100)
MONOCYTES # BLD AUTO: 608 CELLS/UL (ref 200–950)
MONOCYTES NFR BLD AUTO: 7.8 %
NEUTROPHILS # BLD AUTO: 5281 CELLS/UL (ref 1500–7800)
NEUTROPHILS NFR BLD AUTO: 67.7 %
NONHDLC SERPL-MCNC: 193 MG/DL (CALC)
PLATELET # BLD AUTO: 369 THOUSAND/UL (ref 140–400)
PMV BLD REES-ECKER: 9.7 FL (ref 7.5–12.5)
POTASSIUM SERPL-SCNC: 4.4 MMOL/L (ref 3.5–5.3)
PROT SERPL-MCNC: 7.4 G/DL (ref 6.1–8.1)
RBC # BLD AUTO: 5.9 MILLION/UL (ref 4.2–5.8)
SODIUM SERPL-SCNC: 141 MMOL/L (ref 135–146)
TRIGL SERPL-MCNC: 123 MG/DL
TSH SERPL-ACNC: 1.22 MIU/L (ref 0.4–4.5)
WBC # BLD AUTO: 7.8 THOUSAND/UL (ref 3.8–10.8)

## 2022-09-14 ENCOUNTER — HOSPITAL ENCOUNTER (EMERGENCY)
Facility: HOSPITAL | Age: 67
Discharge: HOME | End: 2022-09-14
Attending: EMERGENCY MEDICINE
Payer: MEDICARE

## 2022-09-14 VITALS
HEART RATE: 99 BPM | SYSTOLIC BLOOD PRESSURE: 146 MMHG | DIASTOLIC BLOOD PRESSURE: 81 MMHG | OXYGEN SATURATION: 97 % | RESPIRATION RATE: 19 BRPM | TEMPERATURE: 98 F

## 2022-09-14 DIAGNOSIS — B36.9 FUNGAL RASH OF TRUNK: Primary | ICD-10-CM

## 2022-09-14 DIAGNOSIS — L25.9 CONTACT DERMATITIS: ICD-10-CM

## 2022-09-14 PROCEDURE — 99282 EMERGENCY DEPT VISIT SF MDM: CPT

## 2022-09-14 PROCEDURE — 99284 EMERGENCY DEPT VISIT MOD MDM: CPT | Performed by: EMERGENCY MEDICINE

## 2022-09-14 RX ORDER — CLOTRIMAZOLE AND BETAMETHASONE DIPROPIONATE 10; .64 MG/G; MG/G
CREAM TOPICAL
Qty: 15 G | Refills: 0 | Status: SHIPPED | OUTPATIENT
Start: 2022-09-14 | End: 2022-10-06 | Stop reason: ALTCHOICE

## 2022-09-14 RX ORDER — CLOTRIMAZOLE AND BETAMETHASONE DIPROPIONATE 10; .64 MG/G; MG/G
CREAM TOPICAL ONCE
Status: COMPLETED | OUTPATIENT
Start: 2022-09-14 | End: 2022-09-14

## 2022-09-14 RX ADMIN — CLOTRIMAZOLE AND BETAMETHASONE DIPROPIONATE: 10; .64 CREAM TOPICAL at 17:16

## 2022-09-14 NOTE — ED PROVIDER NOTES
History  Chief Complaint   Patient presents with    Rash     Pt having an allergic reaction on his groin area from shower   This is a 79-year-old male who presents emergency department with complaint of rash in the bilateral inguinal region  The patient states that he was using Tylox mold and mildew spray in his bathroom and when he sprayed on the wall some of it came back and saturated his jeans in his inguinal area  Patient states that he has developed some burning shortly thereafter  He washed this and also his use calamine lotion and Benadryl cream for same  He continues to have some burning therefore came to the emergency department for evaluation  The patient denies any prior history of  rash in the area  It is remaining in the inguinal area  It does not include his penis or scrotum  Does not include abdomen or anterior thighs  No rash on hands noted  No fevers or chills  Patient is very anxious  Chart review shows the patient has had prior treatment with anti fungal creams for thigh rash  Prior to Admission Medications   Prescriptions Last Dose Informant Patient Reported? Taking? [facility] ALLERGY 25 MG capsule   No No   Sig: Take 1 capsule (25 mg total) by mouth daily at bedtime   EPINEPHrine (EPIPEN 2-ARUN) 0 3 mg/0 3 mL SOAJ   No No   Sig: Use in case of anaphylaxis reaction   Menthol, Topical Analgesic, (Flexall 454) 16 % GEL   Yes No   Sig: Apply topically     albuterol (PROVENTIL HFA,VENTOLIN HFA) 90 mcg/act inhaler   No No   Sig: Inhale 2 puffs every 6 (six) hours as needed for wheezing   clonazePAM (KlonoPIN) 0 5 MG disintegrating tablet   Yes No   Sig: DISSOLVE 1 TABLET BY MOUTH EVERY DAY   clonazePAM (KlonoPIN) 0 5 mg tablet   Yes No   Sig: Take by mouth 4 (four) times a day as needed   clotrimazole (LOTRIMIN) 1 % cream   No No   Sig: Apply to affected area 2 times daily   cyclobenzaprine (FLEXERIL) 10 mg tablet   Yes No   Sig: Take 10 mg by mouth 3 (three) times a day as needed for muscle spasms   diclofenac sodium (VOLTAREN) 1 %   Yes No   Sig: APPLY TO AFFECTED AREA EVERY DAY   diphenhydrAMINE (BENADRYL) 25 mg tablet   No No   Sig: Take 1 tablet (25 mg total) by mouth every 6 (six) hours as needed for itching (Breakthrough Itching)   fexofenadine (ALLEGRA) 180 MG tablet   No No   Sig: Take 1 tablet (180 mg total) by mouth 2 (two) times a day   fexofenadine (ALLEGRA) 60 MG tablet   No No   Sig: Take 1 tablet (60 mg total) by mouth 2 (two) times a day   fluticasone (FLONASE) 50 mcg/act nasal spray   No No   Si sprays into each nostril daily   ibuprofen (MOTRIN) 200 mg tablet   Yes No   Sig: Take 200 mg by mouth every 6 (six) hours as needed for mild pain    lidocaine (LIDODERM) 5 %   Yes No   Sig: Place 1 patch on the skin every 24 hours Remove & Discard patch within 12 hours or as directed by MD   olopatadine (PATANOL) 0 1 % ophthalmic solution   No No   Sig: Administer 1 drop to both eyes 2 (two) times a day   triamcinolone (KENALOG) 0 1 % cream   No No   Sig: Apply topically 2 (two) times a day   zolpidem (AMBIEN) 10 mg tablet   Yes No      Facility-Administered Medications: None       Past Medical History:   Diagnosis Date    Allergic rhinitis 2011    Anxiety     Asthma     Insomnia     Kidney stone     Myalgia and Myositis 2007    Pain     chronic neck,back, spine    Psychiatric disorder     Temporomandibular joint disorder 2009       Past Surgical History:   Procedure Laterality Date    KIDNEY STONE SURGERY      pt poor historian       Family History   Problem Relation Age of Onset    No Known Problems Mother      I have reviewed and agree with the history as documented      E-Cigarette/Vaping     E-Cigarette/Vaping Substances     Social History     Tobacco Use    Smoking status: Never Smoker    Smokeless tobacco: Never Used   Substance Use Topics    Alcohol use: No    Drug use: No       Review of Systems   Constitutional: Negative for chills and fever  HENT: Negative for ear discharge and facial swelling  Eyes: Negative for photophobia, redness and itching  Respiratory: Negative for cough and shortness of breath  Musculoskeletal: Negative for arthralgias and joint swelling  Skin: Positive for color change and rash  Negative for pallor and wound  Allergic/Immunologic: Positive for environmental allergies  Negative for food allergies  Neurological: Negative for weakness and numbness  Hematological: Does not bruise/bleed easily  Physical Exam  Physical Exam  Vitals and nursing note reviewed  Constitutional:       General: He is not in acute distress  Appearance: He is well-developed  HENT:      Head: Normocephalic and atraumatic  Nose: Nose normal    Eyes:      General: No scleral icterus  Conjunctiva/sclera: Conjunctivae normal    Cardiovascular:      Rate and Rhythm: Normal rate and regular rhythm  Pulmonary:      Effort: Pulmonary effort is normal  No respiratory distress  Musculoskeletal:         General: No tenderness or deformity  Cervical back: Normal range of motion and neck supple  Skin:     General: Skin is warm and dry  Coloration: Skin is not pale  Findings: Rash present  No erythema  Comments: Patient has what appears to be an inguinal candidal rash  There is a layer of thick covering over it which he states is likely calamine lotion and benadryl cream  This was cleansed with note of some underlying excoriated skin  Normal scrotal exam  No signs of cellulitis  Neurological:      Mental Status: He is alert and oriented to person, place, and time  Motor: No abnormal muscle tone        Coordination: Coordination normal    Psychiatric:      Comments: Extremely anxious         Vital Signs  ED Triage Vitals   Temperature Pulse Respirations Blood Pressure SpO2   09/14/22 1145 09/14/22 1146 09/14/22 1146 09/14/22 1146 09/14/22 1146   98 °F (36 7 °C) 99 19 146/81 97 % Temp src Heart Rate Source Patient Position - Orthostatic VS BP Location FiO2 (%)   -- -- -- -- --             Pain Score       --                  Vitals:    09/14/22 1146   BP: 146/81   Pulse: 99         Visual Acuity      ED Medications  Medications   clotrimazole-betamethasone (LOTRISONE) 1-0 05 % cream (has no administration in time range)       Diagnostic Studies  Results Reviewed     None                 No orders to display              Procedures  Procedures         ED Course                               SBIRT 20yo+    Flowsheet Row Most Recent Value   SBIRT (25 yo +)    In order to provide better care to our patients, we are screening all of our patients for alcohol and drug use  Would it be okay to ask you these screening questions? Yes Filed at: 09/14/2022 1535   Initial Alcohol Screen: US AUDIT-C     1  How often do you have a drink containing alcohol? 0 Filed at: 09/14/2022 1535   2  How many drinks containing alcohol do you have on a typical day you are drinking? 0 Filed at: 09/14/2022 1535   3a  Male UNDER 65: How often do you have five or more drinks on one occasion? 0 Filed at: 09/14/2022 1535   3b  FEMALE Any Age, or MALE 65+: How often do you have 4 or more drinks on one occassion? 0 Filed at: 09/14/2022 1535   Audit-C Score 0 Filed at: 09/14/2022 1535   IZZY: How many times in the past year have you    Used an illegal drug or used a prescription medication for non-medical reasons?  Never Filed at: 09/14/2022 1535                    MDM    Disposition  Final diagnoses:   Fungal rash of trunk - Inguinal region   Contact dermatitis     Time reflects when diagnosis was documented in both MDM as applicable and the Disposition within this note     Time User Action Codes Description Comment    9/14/2022  4:41 PM Marjan Watkins Add [B36 9] Fungal rash of trunk     9/14/2022  4:41 PM Marjan Watkins Modify [B36 9] Fungal rash of trunk Inguinal region    9/14/2022  4:41 PM Marjan Watkins Add [L25 9] Contact dermatitis       ED Disposition     ED Disposition   Discharge    Condition   Stable    Date/Time   Wed Sep 14, 2022  4:40 PM    Comment   Edmar Lopez discharge to home/self care  Follow-up Information     Follow up With Specialties Details Why Contact Info    Adriana Sam MD Family Medicine In 5 days For re-evaluation and follow-up for this visit [address] [address] [place] [phone]            Patient's Medications   Discharge Prescriptions    CLOTRIMAZOLE-BETAMETHASONE (Keeley Chayo) 1-0 05 % CREAM    Apply to affected area 2 times daily prn       Start Date: 9/14/2022 End Date: --       Order Dose: --       Quantity: 15 g    Refills: 0       No discharge procedures on file      PDMP Review     None          ED Provider  Electronically Signed by           David Becerra MD  09/14/22 5444

## 2022-09-22 ENCOUNTER — OFFICE VISIT (OUTPATIENT)
Dept: FAMILY MEDICINE CLINIC | Facility: CLINIC | Age: 67
End: 2022-09-22
Payer: MEDICARE

## 2022-09-22 VITALS
HEART RATE: 114 BPM | OXYGEN SATURATION: 97 % | HEIGHT: 69 IN | BODY MASS INDEX: 27.11 KG/M2 | WEIGHT: 183 LBS | DIASTOLIC BLOOD PRESSURE: 100 MMHG | SYSTOLIC BLOOD PRESSURE: 150 MMHG | RESPIRATION RATE: 16 BRPM

## 2022-09-22 DIAGNOSIS — J45.20 MILD INTERMITTENT ASTHMA WITHOUT COMPLICATION: ICD-10-CM

## 2022-09-22 DIAGNOSIS — E78.2 MIXED HYPERLIPIDEMIA: Primary | ICD-10-CM

## 2022-09-22 DIAGNOSIS — R73.09 ELEVATED GLUCOSE: ICD-10-CM

## 2022-09-22 DIAGNOSIS — L50.0 ALLERGIC URTICARIA: ICD-10-CM

## 2022-09-22 DIAGNOSIS — F41.9 ANXIETY: ICD-10-CM

## 2022-09-22 DIAGNOSIS — I10 PRIMARY HYPERTENSION: ICD-10-CM

## 2022-09-22 PROCEDURE — 99214 OFFICE O/P EST MOD 30 MIN: CPT | Performed by: FAMILY MEDICINE

## 2022-09-22 RX ORDER — ATORVASTATIN CALCIUM 10 MG/1
10 TABLET, FILM COATED ORAL DAILY
Qty: 90 TABLET | Refills: 1 | Status: SHIPPED | OUTPATIENT
Start: 2022-09-22

## 2022-09-22 RX ORDER — CLOTRIMAZOLE AND BETAMETHASONE DIPROPIONATE 10; .64 MG/G; MG/G
CREAM TOPICAL 2 TIMES DAILY
Qty: 30 G | Refills: 0 | Status: SHIPPED | OUTPATIENT
Start: 2022-09-22 | End: 2022-10-06 | Stop reason: ALTCHOICE

## 2022-09-22 NOTE — ASSESSMENT & PLAN NOTE
His blood pressure is high for the last few times and discussed with him that he should be starting some medication, he says this is only high when he goes to a place as due to anxiety it goes up, and his heart rate is up to, he does not want to start medicine, advised to then check his blood pressure at home daily and bring his diary with him next 2 weeks and he also will cut down caffeine

## 2022-09-22 NOTE — ASSESSMENT & PLAN NOTE
Lab Results   Component Value Date    HGBA1C 6 1 (H) 09/09/2022   He has impaired fasting glucose and A1c is elevated, he should continue with low carb diet and gave him handouts about the diet

## 2022-09-22 NOTE — PROGRESS NOTES
Name: Jt Kaye      :       MRN: [id number]  Encounter Provider: Clair Izaguirre MD  Encounter Date: 2022   Encounter department: [facility]     1  Mixed hyperlipidemia  Assessment & Plan:  His cholesterol is still significantly elevated, and discussed with him that his AS CVD risk is high he should take his medication he says in the past he took the simvastatin and did not tolerate well as it give him some stomach problem, advised to start the Lipitor low dose 10 mg daily at nighttime will recheck labs in few months try to eat low-fat low carb diet    Orders:  -     atorvastatin (LIPITOR) 10 mg tablet; Take 1 tablet (10 mg total) by mouth daily  -     Lipid panel; Future; Expected date: 2022    2  Elevated glucose  Assessment & Plan:  Lab Results   Component Value Date    HGBA1C 6 1 (H) 2022   He has impaired fasting glucose and A1c is elevated, he should continue with low carb diet and gave him handouts about the diet      Orders:  -     Hemoglobin A1C; Future; Expected date: 2022  -     Basic metabolic panel; Future; Expected date: 2022    3  Mild intermittent asthma without complication  Assessment & Plan:  Discussed with him to get the pneumonia vaccine, patient is very scared and he does not want to do it, he is concerned about side effects      4  Anxiety  Assessment & Plan:  Says he is always nervous and anxious specially coming to her doctor's office and his blood pressure could be high due to that, he will follow-up in 2 weeks for recheck on blood pressure      5   Primary hypertension  Assessment & Plan:  His blood pressure is high for the last few times and discussed with him that he should be starting some medication, he says this is only high when he goes to a place as due to anxiety it goes up, and his heart rate is up to, he does not want to start medicine, advised to then check his blood pressure at home daily and bring his diary with him next 2 weeks and he also will cut down caffeine      6  Allergic urticaria  Assessment & Plan:  The rash is healing on both buttock, he says it was due to some cleaning spray which accidentally fell on his thighs, he was seen in ER and was given clotrimazole betamethasone soon and he says it was too small  tube and he needs another 1 ,as he feels some itchy    Orders:  -     clotrimazole-betamethasone (LOTRISONE) 1-0 05 % cream; Apply topically 2 (two) times a day           Subjective     He is here follow-up on his labs, he has mild intermittent asthma, he has recently he rash on his both groins and upper part of the legs after a exposure to some cleaning chemical, he went to the ER was given topical steroid and anti fungal and he feels much better he said they gave him small tube and he wants to have another 1 till it completely resolves,   He says he is always anxious and his blood pressure goes up when he goes to a doctor's office, he never have elevated blood pressure,   He has hyperlipidemia in the past he used simvastatin which did not go well with his stomach, so he stopped taking it    Review of Systems   Constitutional: Negative for activity change, appetite change, chills, fatigue, fever and unexpected weight change  HENT: Negative for congestion, ear discharge, ear pain, nosebleeds, postnasal drip, rhinorrhea, sinus pressure, sneezing, sore throat, trouble swallowing and voice change  Eyes: Negative for photophobia, pain, discharge, redness and itching  Respiratory: Negative for cough, chest tightness, shortness of breath and wheezing  Cardiovascular: Negative for chest pain, palpitations and leg swelling  Gastrointestinal: Negative for abdominal pain, constipation, diarrhea, nausea and vomiting  Endocrine: Negative for polyuria  Genitourinary: Negative for dysuria, frequency and urgency     Musculoskeletal: Negative for arthralgias, back pain, myalgias and neck pain    Skin: Negative for color change, pallor and rash  Allergic/Immunologic: Negative for environmental allergies and food allergies  Neurological: Negative for dizziness, weakness, light-headedness and headaches  Hematological: Negative for adenopathy  Does not bruise/bleed easily  Psychiatric/Behavioral: Negative for behavioral problems  The patient is not nervous/anxious          Past Medical History:   Diagnosis Date    Allergic rhinitis 01/13/2011    Anxiety     Asthma     Insomnia     Kidney stone     Myalgia and Myositis 11/13/2007    Pain     chronic neck,back, spine    Psychiatric disorder     Temporomandibular joint disorder 01/06/2009     Past Surgical History:   Procedure Laterality Date    KIDNEY STONE SURGERY      pt poor historian     Family History   Problem Relation Age of Onset   Pippa Nine Cancer Mother     No Known Problems Father      Social History     Socioeconomic History    Marital status: /Civil Union     Spouse name: None    Number of children: 1    Years of education: None    Highest education level: None   Occupational History    Occupation: On disability   Tobacco Use    Smoking status: Never Smoker    Smokeless tobacco: Never Used   Substance and Sexual Activity    Alcohol use: No    Drug use: No    Sexual activity: None   Other Topics Concern    None   Social History Narrative    None     Social Determinants of Health     Financial Resource Strain: Not on file   Food Insecurity: Not on file   Transportation Needs: Not on file   Physical Activity: Not on file   Stress: Not on file   Social Connections: Not on file   Intimate Partner Violence: Not on file   Housing Stability: Not on file     Current Outpatient Medications on File Prior to Visit   Medication Sig    albuterol (PROVENTIL HFA,VENTOLIN HFA) 90 mcg/act inhaler Inhale 2 puffs every 6 (six) hours as needed for wheezing    clonazePAM (KlonoPIN) 0 5 MG disintegrating tablet DISSOLVE 1 TABLET BY MOUTH EVERY DAY    clonazePAM (KlonoPIN) 0 5 mg tablet Take by mouth 4 (four) times a day as needed    clotrimazole-betamethasone (LOTRISONE) 1-0 05 % cream Apply to affected area 2 times daily prn    [facility] ALLERGY 25 MG capsule Take 1 capsule (25 mg total) by mouth daily at bedtime    cyclobenzaprine (FLEXERIL) 10 mg tablet Take 10 mg by mouth 3 (three) times a day as needed for muscle spasms    diclofenac sodium (VOLTAREN) 1 % APPLY TO AFFECTED AREA EVERY DAY    diphenhydrAMINE (BENADRYL) 25 mg tablet Take 1 tablet (25 mg total) by mouth every 6 (six) hours as needed for itching (Breakthrough Itching)    EPINEPHrine (EPIPEN 2-ARUN) 0 3 mg/0 3 mL SOAJ Use in case of anaphylaxis reaction    fexofenadine (ALLEGRA) 180 MG tablet Take 1 tablet (180 mg total) by mouth 2 (two) times a day    fexofenadine (ALLEGRA) 60 MG tablet Take 1 tablet (60 mg total) by mouth 2 (two) times a day    fluticasone (FLONASE) 50 mcg/act nasal spray 2 sprays into each nostril daily    ibuprofen (MOTRIN) 200 mg tablet Take 200 mg by mouth every 6 (six) hours as needed for mild pain     lidocaine (LIDODERM) 5 % Place 1 patch on the skin every 24 hours Remove & Discard patch within 12 hours or as directed by MD    Menthol, Topical Analgesic, (Flexall 454) 16 % GEL Apply topically      olopatadine (PATANOL) 0 1 % ophthalmic solution Administer 1 drop to both eyes 2 (two) times a day    zolpidem (AMBIEN) 10 mg tablet     [DISCONTINUED] clotrimazole (LOTRIMIN) 1 % cream Apply to affected area 2 times daily    [DISCONTINUED] triamcinolone (KENALOG) 0 1 % cream Apply topically 2 (two) times a day     Allergies   Allergen Reactions    Insect Extract Allergy Skin Test Hives    Levofloxacin Hives    Penicillins Hives     Reaction Date: [id number];      Immunization History   Administered Date(s) Administered    COVID-19 PFIZER VACCINE 0 3 ML IM 04/03/2021, 04/24/2021    COVID-19 Pfizer vac (Ez-sucrose, gray cap) 12 yr+ IM 01/30/2022  INFLUENZA 09/27/2018, 09/12/2022    Influenza Injectable, MDCK, Preservative Free, Quadrivalent, 0 5 mL 09/30/2019    Influenza, injectable, quadrivalent, preservative free 0 5 mL 09/04/2020       Objective     /100   Pulse (!) 114   Resp 16   Ht 5' 8 5" (1 74 m)   Wt 83 kg (183 lb)   SpO2 97%   BMI 27 42 kg/m²     Physical Exam  Vitals and nursing note reviewed  Constitutional:       Appearance: Normal appearance  He is well-developed  He is not ill-appearing  HENT:      Head: Normocephalic and atraumatic  Mouth/Throat:      Pharynx: No oropharyngeal exudate  Eyes:      General: No scleral icterus  Right eye: No discharge  Left eye: No discharge  Conjunctiva/sclera: Conjunctivae normal       Pupils: Pupils are equal, round, and reactive to light  Neck:      Thyroid: No thyromegaly  Trachea: No tracheal deviation  Cardiovascular:      Rate and Rhythm: Normal rate and regular rhythm  Heart sounds: Normal heart sounds  No murmur heard  Pulmonary:      Effort: Pulmonary effort is normal  No respiratory distress  Breath sounds: Normal breath sounds  No wheezing or rales  Abdominal:      General: There is no distension  Palpations: Abdomen is soft  There is no mass  Tenderness: There is no abdominal tenderness  There is no rebound  Musculoskeletal:         General: Normal range of motion  Cervical back: Normal range of motion and neck supple  Right lower leg: No edema  Left lower leg: No edema  Lymphadenopathy:      Cervical: No cervical adenopathy  Skin:     General: Skin is warm  Coloration: Skin is not jaundiced or pale  Findings: Rash present  No bruising or erythema  Comments: Healing rash on thighs , no erythema   Neurological:      Mental Status: He is alert and oriented to person, place, and time  Cranial Nerves: No cranial nerve deficit        Deep Tendon Reflexes: Reflexes are normal and symmetric  Psychiatric:         Behavior: Behavior normal          Thought Content:  Thought content normal          Judgment: Judgment normal        Linda Ann MD

## 2022-09-22 NOTE — ASSESSMENT & PLAN NOTE
His cholesterol is still significantly elevated, and discussed with him that his AS CVD risk is high he should take his medication he says in the past he took the simvastatin and did not tolerate well as it give him some stomach problem, advised to start the Lipitor low dose 10 mg daily at nighttime will recheck labs in few months try to eat low-fat low carb diet

## 2022-09-22 NOTE — ASSESSMENT & PLAN NOTE
The rash is healing on both buttock, he says it was due to some cleaning spray which accidentally fell on his thighs, he was seen in ER and was given clotrimazole betamethasone soon and he says it was too small  tube and he needs another 1 ,as he feels some itchy

## 2022-09-22 NOTE — ASSESSMENT & PLAN NOTE
Says he is always nervous and anxious specially coming to her doctor's office and his blood pressure could be high due to that, he will follow-up in 2 weeks for recheck on blood pressure

## 2022-09-22 NOTE — ASSESSMENT & PLAN NOTE
Discussed with him to get the pneumonia vaccine, patient is very scared and he does not want to do it, he is concerned about side effects

## 2022-09-23 ENCOUNTER — TELEPHONE (OUTPATIENT)
Dept: ADMINISTRATIVE | Facility: OTHER | Age: 67
End: 2022-09-23

## 2022-09-23 NOTE — TELEPHONE ENCOUNTER
Upon review of the In Basket request and the patient's chart, initial outreach has been made via telephone call, please see Contacts section for details      Left message on My [employer] [phone] Faxed request also  Thank you  Katie Person MA

## 2022-09-23 NOTE — TELEPHONE ENCOUNTER
----- Message from Danni Adams sent at 9/22/2022  2:54 PM EDT -----  Regarding: COLONOSCOPY  Contact: [phone]  12/18/19 4:05 PM    Hello, our patient Jairon Watters has had CRC: Colonoscopy completed/performed  Please assist in updating the patient chart by pulling the document from scanned documentsTab within Chart Review   The date of service is 4/27/2022      Thank you,  Irena HEARD FORKS

## 2022-09-23 NOTE — LETTER
Procedure Request Form: Colonoscopy      Date Requested: 22  Patient: [place]  Patient : 1955   Referring Provider: Jennifer Duty, MD        Date of Procedure ______________________________       The above patient has informed us that they have completed their   most recent Colonoscopy at your facility  Please complete   this form and attach all corresponding procedure reports/results  Comments __________________________________________________________  ____________________________________________________________________  ____________________________________________________________________  ____________________________________________________________________    Facility Completing Procedure _________________________________________    Form Completed By (print name) _______________________________________      Signature __________________________________________________________      These reports are needed for  compliance  Please fax this completed form and a copy of the procedure report to our office located at [place] [address] as soon as possible to [phone] dafne Cox: Phone [phone]    We thank you for your assistance in treating our mutual patient

## 2022-09-29 NOTE — TELEPHONE ENCOUNTER
Upon review of the In Basket request we were able to locate, review, and update the patient chart as requested for CRC: Colonoscopy  Any additional questions or concerns should be emailed to the Practice Liaisons via [email or web address] email, please do not reply via In Basket      Thank you  Vikki Li

## 2022-10-06 ENCOUNTER — OFFICE VISIT (OUTPATIENT)
Dept: FAMILY MEDICINE CLINIC | Facility: CLINIC | Age: 67
End: 2022-10-06
Payer: MEDICARE

## 2022-10-06 VITALS
SYSTOLIC BLOOD PRESSURE: 130 MMHG | BODY MASS INDEX: 27.11 KG/M2 | RESPIRATION RATE: 16 BRPM | HEART RATE: 113 BPM | OXYGEN SATURATION: 98 % | WEIGHT: 183 LBS | HEIGHT: 69 IN | DIASTOLIC BLOOD PRESSURE: 82 MMHG

## 2022-10-06 DIAGNOSIS — L50.0 ALLERGIC URTICARIA: Primary | ICD-10-CM

## 2022-10-06 DIAGNOSIS — I10 PRIMARY HYPERTENSION: ICD-10-CM

## 2022-10-06 DIAGNOSIS — T78.40XS ALLERGIC DISORDER, SEQUELA: ICD-10-CM

## 2022-10-06 DIAGNOSIS — E78.2 MIXED HYPERLIPIDEMIA: ICD-10-CM

## 2022-10-06 PROBLEM — B35.6 TINEA CRURIS: Status: RESOLVED | Noted: 2017-05-16 | Resolved: 2022-10-06

## 2022-10-06 PROCEDURE — 99214 OFFICE O/P EST MOD 30 MIN: CPT | Performed by: FAMILY MEDICINE

## 2022-10-06 RX ORDER — FLUTICASONE PROPIONATE 50 MCG
2 SPRAY, SUSPENSION (ML) NASAL DAILY
Qty: 16 ML | Refills: 5 | Status: SHIPPED | OUTPATIENT
Start: 2022-10-06

## 2022-10-06 RX ORDER — TRIAMCINOLONE ACETONIDE 1 MG/G
CREAM TOPICAL 2 TIMES DAILY
Qty: 30 G | Refills: 0 | Status: SHIPPED | OUTPATIENT
Start: 2022-10-06 | End: 2022-10-18

## 2022-10-06 RX ORDER — PREDNISONE 10 MG/1
TABLET ORAL
Qty: 26 TABLET | Refills: 0 | Status: SHIPPED | OUTPATIENT
Start: 2022-10-06 | End: 2022-10-26

## 2022-10-06 NOTE — ASSESSMENT & PLAN NOTE
His rash is not improving with topical steroid and anti fungal, will give him tapering dose of prednisone and topical steroids only, and follow-up if not better this rash started after some cleaning spray which accidentally fell on his thigh

## 2022-10-06 NOTE — PROGRESS NOTES
Name: Ernst Santillan      : [id number]      MRN: [id number]  Encounter Provider: Bette Linda MD  Encounter Date: 10/6/2022   Encounter department: [facility]     1  Allergic urticaria  Assessment & Plan:  His rash is not improving with topical steroid and anti fungal, will give him tapering dose of prednisone and topical steroids only, and follow-up if not better this rash started after some cleaning spray which accidentally fell on his thigh    Orders:  -     predniSONE 10 mg tablet; 6 tablets for 1st 2 days, 4 tablets for next 2 days, 2 tablets for 2 days and then 1 tablet for 2 days  -     triamcinolone (KENALOG) 0 1 % cream; Apply topically 2 (two) times a day    2  Allergic disorder, sequela  -     fluticasone (FLONASE) 50 mcg/act nasal spray; 2 sprays into each nostril daily    3  Primary hypertension  Assessment & Plan:  When he gets excited blood pressure goes up, recheck blood pressure is normal      4  Mixed hyperlipidemia  Assessment & Plan:  He was started on Lipitor and he is tolerating well             Subjective     2 week follow-up on hypertension, he says he gets very anxious when he comes to the doctor's office that is was blood pressure goes up and otherwise he feels fine, he denies any headache or any other symptoms, he says his rash on his both legs which started few weeks ago after some chemical and is not getting better completely is better but is still there slightly itchy, he is very sensitive in the past whenever he gets rash she takes longer time to resolve he was using steroid and anti fungal cream to gether  He was started on Lipitor and he says he is tolerating it well, previously he could not tolerate simvastatin due to some GI side effects    Review of Systems   Constitutional: Negative for activity change, appetite change, chills, fatigue, fever and unexpected weight change     HENT: Negative for congestion, ear discharge, ear pain, nosebleeds, postnasal drip, rhinorrhea, sinus pressure, sneezing, sore throat, trouble swallowing and voice change  Eyes: Negative for photophobia, pain, discharge, redness and itching  Respiratory: Negative for cough, chest tightness, shortness of breath and wheezing  Cardiovascular: Negative for chest pain, palpitations and leg swelling  Gastrointestinal: Negative for abdominal pain, constipation, diarrhea, nausea and vomiting  Endocrine: Negative for polyuria  Genitourinary: Negative for dysuria, frequency and urgency  Musculoskeletal: Negative for arthralgias, back pain, myalgias and neck pain  Skin: Positive for pallor  Negative for color change and rash  Rash on both upper thigh   Allergic/Immunologic: Negative for environmental allergies and food allergies  Neurological: Negative for dizziness, weakness, light-headedness and headaches  Hematological: Negative for adenopathy  Does not bruise/bleed easily  Psychiatric/Behavioral: Negative for behavioral problems  The patient is not nervous/anxious          Past Medical History:   Diagnosis Date    Allergic rhinitis 01/13/2011    Anxiety     Asthma     Insomnia     Kidney stone     Myalgia and Myositis 11/13/2007    Pain     chronic neck,back, spine    Psychiatric disorder     Temporomandibular joint disorder 01/06/2009     Past Surgical History:   Procedure Laterality Date    KIDNEY STONE SURGERY      pt poor historian     Family History   Problem Relation Age of Onset    Cancer Mother     No Known Problems Father      Social History     Socioeconomic History    Marital status: /Civil Union     Spouse name: None    Number of children: 1    Years of education: None    Highest education level: None   Occupational History    Occupation: On disability   Tobacco Use    Smoking status: Never Smoker    Smokeless tobacco: Never Used   Substance and Sexual Activity    Alcohol use: No    Drug use: No    Sexual activity: None   Other Topics Concern    None   Social History Narrative    None     Social Determinants of Health     Financial Resource Strain: Not on file   Food Insecurity: Not on file   Transportation Needs: Not on file   Physical Activity: Not on file   Stress: Not on file   Social Connections: Not on file   Intimate Partner Violence: Not on file   Housing Stability: Not on file     Current Outpatient Medications on File Prior to Visit   Medication Sig    albuterol (PROVENTIL HFA,VENTOLIN HFA) 90 mcg/act inhaler Inhale 2 puffs every 6 (six) hours as needed for wheezing    atorvastatin (LIPITOR) 10 mg tablet Take 1 tablet (10 mg total) by mouth daily    clonazePAM (KlonoPIN) 0 5 mg tablet Take by mouth 4 (four) times a day as needed    [facility] ALLERGY 25 MG capsule Take 1 capsule (25 mg total) by mouth daily at bedtime    cyclobenzaprine (FLEXERIL) 10 mg tablet Take 10 mg by mouth 3 (three) times a day as needed for muscle spasms    diclofenac sodium (VOLTAREN) 1 % APPLY TO AFFECTED AREA EVERY DAY    diphenhydrAMINE (BENADRYL) 25 mg tablet Take 1 tablet (25 mg total) by mouth every 6 (six) hours as needed for itching (Breakthrough Itching)    EPINEPHrine (EPIPEN 2-ARUN) 0 3 mg/0 3 mL SOAJ Use in case of anaphylaxis reaction    fexofenadine (ALLEGRA) 180 MG tablet Take 1 tablet (180 mg total) by mouth 2 (two) times a day    fexofenadine (ALLEGRA) 60 MG tablet Take 1 tablet (60 mg total) by mouth 2 (two) times a day    ibuprofen (MOTRIN) 200 mg tablet Take 200 mg by mouth every 6 (six) hours as needed for mild pain     lidocaine (LIDODERM) 5 % Place 1 patch on the skin every 24 hours Remove & Discard patch within 12 hours or as directed by MD    Menthol, Topical Analgesic, 16 % GEL Apply topically      olopatadine (PATANOL) 0 1 % ophthalmic solution Administer 1 drop to both eyes 2 (two) times a day    zolpidem (AMBIEN) 10 mg tablet     [DISCONTINUED] clotrimazole-betamethasone (LOTRISONE) 1-0 05 % cream Apply to affected area 2 times daily prn    [DISCONTINUED] clotrimazole-betamethasone (LOTRISONE) 1-0 05 % cream Apply topically 2 (two) times a day    [DISCONTINUED] fluticasone (FLONASE) 50 mcg/act nasal spray 2 sprays into each nostril daily    clonazePAM (KlonoPIN) 0 5 MG disintegrating tablet DISSOLVE 1 TABLET BY MOUTH EVERY DAY     Allergies   Allergen Reactions    Insect Extract Allergy Skin Test Hives    Levofloxacin Hives    Penicillins Hives     Reaction Date: [id number];      Immunization History   Administered Date(s) Administered    COVID-19 PFIZER VACCINE 0 3 ML IM 04/03/2021, 04/24/2021    COVID-19 Pfizer vac (Ez-sucrose, gray cap) 12 yr+ IM 01/30/2022    INFLUENZA 09/27/2018, 09/12/2022    Influenza Injectable, MDCK, Preservative Free, Quadrivalent, 0 5 mL 09/30/2019    Influenza, injectable, quadrivalent, preservative free 0 5 mL 09/04/2020       Objective     /82   Pulse (!) 113   Resp 16   Ht 5' 8 5" (1 74 m)   Wt 83 kg (183 lb)   SpO2 98%   BMI 27 42 kg/m²     Physical Exam  Vitals and nursing note reviewed  Constitutional:       Appearance: He is well-developed  HENT:      Head: Normocephalic and atraumatic  Eyes:      General: No scleral icterus  Conjunctiva/sclera: Conjunctivae normal       Pupils: Pupils are equal, round, and reactive to light  Neck:      Thyroid: No thyromegaly  Cardiovascular:      Rate and Rhythm: Normal rate and regular rhythm  Heart sounds: Normal heart sounds  No murmur heard  Pulmonary:      Effort: Pulmonary effort is normal       Breath sounds: Normal breath sounds  No wheezing or rales  Musculoskeletal:      Cervical back: Normal range of motion and neck supple  Right lower leg: No edema  Left lower leg: No edema  Lymphadenopathy:      Cervical: No cervical adenopathy  Skin:     Findings: Rash present  No erythema        Comments: Irregular erythema present on both thighs   Neurological:      Mental Status: He is alert     Psychiatric:      Comments: Very anxious       Adriana Sam MD

## 2022-10-11 PROBLEM — Z00.00 MEDICARE ANNUAL WELLNESS VISIT, SUBSEQUENT: Status: RESOLVED | Noted: 2019-01-14 | Resolved: 2022-10-11

## 2022-10-11 PROBLEM — Z11.59 NEED FOR HEPATITIS C SCREENING TEST: Status: RESOLVED | Noted: 2022-05-09 | Resolved: 2022-10-11

## 2022-10-18 DIAGNOSIS — L50.0 ALLERGIC URTICARIA: ICD-10-CM

## 2022-10-18 RX ORDER — TRIAMCINOLONE ACETONIDE 1 MG/G
CREAM TOPICAL
Qty: 30 G | Refills: 0 | Status: SHIPPED | OUTPATIENT
Start: 2022-10-18 | End: 2022-10-26

## 2022-10-25 DIAGNOSIS — L50.0 ALLERGIC URTICARIA: ICD-10-CM

## 2022-10-26 RX ORDER — PREDNISONE 10 MG/1
TABLET ORAL
Qty: 26 TABLET | Refills: 0 | Status: SHIPPED | OUTPATIENT
Start: 2022-10-26 | End: 2022-11-03 | Stop reason: ALTCHOICE

## 2022-10-26 RX ORDER — TRIAMCINOLONE ACETONIDE 1 MG/G
CREAM TOPICAL
Qty: 30 G | Refills: 0 | Status: SHIPPED | OUTPATIENT
Start: 2022-10-26 | End: 2022-11-03 | Stop reason: ALTCHOICE

## 2022-11-03 ENCOUNTER — OFFICE VISIT (OUTPATIENT)
Dept: FAMILY MEDICINE CLINIC | Facility: CLINIC | Age: 67
End: 2022-11-03

## 2022-11-03 VITALS
SYSTOLIC BLOOD PRESSURE: 142 MMHG | HEART RATE: 98 BPM | RESPIRATION RATE: 16 BRPM | OXYGEN SATURATION: 99 % | BODY MASS INDEX: 27.33 KG/M2 | WEIGHT: 182.4 LBS | DIASTOLIC BLOOD PRESSURE: 90 MMHG

## 2022-11-03 DIAGNOSIS — L03.119 CELLULITIS OF LOWER EXTREMITY, UNSPECIFIED LATERALITY: ICD-10-CM

## 2022-11-03 DIAGNOSIS — L50.9 URTICARIA: Primary | ICD-10-CM

## 2022-11-03 DIAGNOSIS — B35.6 TINEA CRURIS: ICD-10-CM

## 2022-11-03 RX ORDER — FLUCONAZOLE 150 MG/1
TABLET ORAL
Qty: 2 TABLET | Refills: 0 | Status: SHIPPED | OUTPATIENT
Start: 2022-11-03 | End: 2022-11-10

## 2022-11-03 RX ORDER — SULFAMETHOXAZOLE AND TRIMETHOPRIM 800; 160 MG/1; MG/1
1 TABLET ORAL EVERY 12 HOURS SCHEDULED
Qty: 14 TABLET | Refills: 0 | Status: SHIPPED | OUTPATIENT
Start: 2022-11-03 | End: 2022-11-10

## 2022-11-03 RX ORDER — CLOTRIMAZOLE 1 %
CREAM (GRAM) TOPICAL 2 TIMES DAILY
Qty: 30 G | Refills: 0 | Status: SHIPPED | OUTPATIENT
Start: 2022-11-03

## 2022-11-03 NOTE — PROGRESS NOTES
Name: Pastor Melara      :       MRN: [id number]  Encounter Provider: Francisco J Shetty MD  Encounter Date: 11/3/2022   Encounter department: [facility]     1  Urticaria  -     sulfamethoxazole-trimethoprim (BACTRIM DS) 800-160 mg per tablet; Take 1 tablet by mouth every 12 (twelve) hours for 7 days  -     Ambulatory Referral to Dermatology; Future    2  Cellulitis of lower extremity, unspecified laterality  Assessment & Plan:  He has most likely fungal infection along with some cellulitis, will start on Bactrim and Diflucan, and topical Lotrimin advised to follow up in few days,   For pain he will take some ibuprofen    Orders:  -     sulfamethoxazole-trimethoprim (BACTRIM DS) 800-160 mg per tablet; Take 1 tablet by mouth every 12 (twelve) hours for 7 days  -     Ambulatory Referral to Dermatology; Future    3  Tinea cruris  Assessment & Plan:  Rash on both groin genital and on the upper part of the legs    Orders:  -     fluconazole (DIFLUCAN) 150 mg tablet; Take 1 tab po today and then take another tab po after 3 days  -     Ambulatory Referral to Dermatology; Future  -     clotrimazole (LOTRIMIN) 1 % cream; Apply topically 2 (two) times a day           Subjective     FZ is a 78year old male presenting to the family practice for a rash on his inner thighs and genital area for the past 2 days  Pt reports the discomfort as "throbbing, burning, itching, red, and painful " Pt had a previous episode of rash last month that resolved with prednisone, but reports that the rash came back in the same areas  He denies any numbness or tingling to the area  Pt denies any fevers or chills  He states he is still able to urinate and have bowel movements  He notes severe pain with movement       Review of Systems    Past Medical History:   Diagnosis Date   • Allergic rhinitis 2011   • Anxiety    • Asthma    • Insomnia    • Kidney stone    • Myalgia and Myositis 2007 • Pain     chronic neck,back, spine   • Psychiatric disorder    • Temporomandibular joint disorder 01/06/2009     Past Surgical History:   Procedure Laterality Date   • KIDNEY STONE SURGERY      pt poor historian     Family History   Problem Relation Age of Onset   • Cancer Mother    • No Known Problems Father      Social History     Socioeconomic History   • Marital status: /Civil Union     Spouse name: None   • Number of children: 1   • Years of education: None   • Highest education level: None   Occupational History   • Occupation: On disability   Tobacco Use   • Smoking status: Never Smoker   • Smokeless tobacco: Never Used   Substance and Sexual Activity   • Alcohol use: No   • Drug use: No   • Sexual activity: None   Other Topics Concern   • None   Social History Narrative   • None     Social Determinants of Health     Financial Resource Strain: Not on file   Food Insecurity: Not on file   Transportation Needs: Not on file   Physical Activity: Not on file   Stress: Not on file   Social Connections: Not on file   Intimate Partner Violence: Not on file   Housing Stability: Not on file     Current Outpatient Medications on File Prior to Visit   Medication Sig   • albuterol (PROVENTIL HFA,VENTOLIN HFA) 90 mcg/act inhaler Inhale 2 puffs every 6 (six) hours as needed for wheezing   • atorvastatin (LIPITOR) 10 mg tablet Take 1 tablet (10 mg total) by mouth daily   • clonazePAM (KlonoPIN) 0 5 MG disintegrating tablet DISSOLVE 1 TABLET BY MOUTH EVERY DAY   • clonazePAM (KlonoPIN) 0 5 mg tablet Take by mouth 4 (four) times a day as needed   • [facility] ALLERGY 25 MG capsule Take 1 capsule (25 mg total) by mouth daily at bedtime   • cyclobenzaprine (FLEXERIL) 10 mg tablet Take 10 mg by mouth 3 (three) times a day as needed for muscle spasms   • diclofenac sodium (VOLTAREN) 1 % APPLY TO AFFECTED AREA EVERY DAY   • diphenhydrAMINE (BENADRYL) 25 mg tablet Take 1 tablet (25 mg total) by mouth every 6 (six) hours as needed for itching (Breakthrough Itching)   • EPINEPHrine (EPIPEN 2-ARUN) 0 3 mg/0 3 mL SOAJ Use in case of anaphylaxis reaction   • fexofenadine (ALLEGRA) 180 MG tablet Take 1 tablet (180 mg total) by mouth 2 (two) times a day   • fexofenadine (ALLEGRA) 60 MG tablet Take 1 tablet (60 mg total) by mouth 2 (two) times a day   • fluticasone (FLONASE) 50 mcg/act nasal spray 2 sprays into each nostril daily   • ibuprofen (MOTRIN) 200 mg tablet Take 200 mg by mouth every 6 (six) hours as needed for mild pain    • lidocaine (LIDODERM) 5 % Place 1 patch on the skin every 24 hours Remove & Discard patch within 12 hours or as directed by MD   • Menthol, Topical Analgesic, 16 % GEL Apply topically  • olopatadine (PATANOL) 0 1 % ophthalmic solution Administer 1 drop to both eyes 2 (two) times a day   • zolpidem (AMBIEN) 10 mg tablet    • [DISCONTINUED] predniSONE 10 mg tablet TAKE 6 TABLETS DAILY X2DAYS, 4TABS DAILY X2DAYS, 2TABS DAILY X2DAYS, THEN 1TAB DAILY X2DAYS   • [DISCONTINUED] triamcinolone (KENALOG) 0 1 % cream APPLY TO AFFECTED AREA TWICE A DAY     Allergies   Allergen Reactions   • Insect Extract Allergy Skin Test Hives   • Levofloxacin Hives   • Penicillins Hives     Reaction Date: [id number];      Immunization History   Administered Date(s) Administered   • COVID-19 PFIZER VACCINE 0 3 ML IM 04/03/2021, 04/24/2021   • COVID-19 Pfizer vac (Ez-sucrose, gray cap) 12 yr+ IM 01/30/2022   • INFLUENZA 09/27/2018, 09/12/2022   • Influenza Injectable, MDCK, Preservative Free, Quadrivalent, 0 5 mL 09/30/2019   • Influenza, injectable, quadrivalent, preservative free 0 5 mL 09/04/2020       Objective     /90 (BP Location: Left arm, Patient Position: Sitting, Cuff Size: Large)   Pulse 98   Resp 16   Wt 82 7 kg (182 lb 6 4 oz)   SpO2 99%   BMI 27 33 kg/m²     Physical Exam  Vitals reviewed  Constitutional:       Appearance: He is well-developed  Eyes:      General: No scleral icterus       Conjunctiva/sclera: Conjunctivae normal       Pupils: Pupils are equal, round, and reactive to light  Neck:      Thyroid: No thyromegaly  Cardiovascular:      Rate and Rhythm: Normal rate and regular rhythm  Heart sounds: Normal heart sounds  Pulmonary:      Effort: Pulmonary effort is normal       Breath sounds: Normal breath sounds  No wheezing or rales  Musculoskeletal:      Cervical back: Normal range of motion and neck supple  Lymphadenopathy:      Cervical: No cervical adenopathy  Skin:     Findings: Rash present  No erythema  Comments: He has bright red skin on the for part of the penis, and he has very bright skin on both upper legs and some peeling of skin   Neurological:      Mental Status: He is alert         Melodie Abraham MD

## 2022-11-03 NOTE — ASSESSMENT & PLAN NOTE
He has most likely fungal infection along with some cellulitis, will start on Bactrim and Diflucan, and topical Lotrimin advised to follow up in few days,   For pain he will take some ibuprofen

## 2022-11-17 DIAGNOSIS — T78.40XS ALLERGIC DISORDER, SEQUELA: ICD-10-CM

## 2022-11-17 RX ORDER — FLUTICASONE PROPIONATE 50 MCG
SPRAY, SUSPENSION (ML) NASAL
Qty: 48 ML | Refills: 2 | Status: SHIPPED | OUTPATIENT
Start: 2022-11-17

## 2022-12-22 LAB
BUN SERPL-MCNC: 12 MG/DL (ref 7–25)
BUN/CREAT SERPL: ABNORMAL (CALC) (ref 6–22)
CALCIUM SERPL-MCNC: 9.5 MG/DL (ref 8.6–10.3)
CHLORIDE SERPL-SCNC: 104 MMOL/L (ref 98–110)
CHOLEST SERPL-MCNC: 166 MG/DL
CHOLEST/HDLC SERPL: 3.5 (CALC)
CO2 SERPL-SCNC: 29 MMOL/L (ref 20–32)
CREAT SERPL-MCNC: 0.87 MG/DL (ref 0.7–1.35)
GFR/BSA.PRED SERPLBLD CYS-BASED-ARV: 95 ML/MIN/1.73M2
GLUCOSE SERPL-MCNC: 110 MG/DL (ref 65–99)
HBA1C MFR BLD: 6 % OF TOTAL HGB
HDLC SERPL-MCNC: 48 MG/DL
LDLC SERPL CALC-MCNC: 101 MG/DL (CALC)
NONHDLC SERPL-MCNC: 118 MG/DL (CALC)
POTASSIUM SERPL-SCNC: 4.5 MMOL/L (ref 3.5–5.3)
SODIUM SERPL-SCNC: 140 MMOL/L (ref 135–146)
TRIGL SERPL-MCNC: 84 MG/DL

## 2022-12-29 DIAGNOSIS — T78.40XS ALLERGIC DISORDER, SEQUELA: ICD-10-CM

## 2022-12-29 RX ORDER — FLUTICASONE PROPIONATE 50 MCG
SPRAY, SUSPENSION (ML) NASAL
Qty: 48 ML | Refills: 3 | Status: SHIPPED | OUTPATIENT
Start: 2022-12-29

## 2023-01-09 ENCOUNTER — OFFICE VISIT (OUTPATIENT)
Dept: FAMILY MEDICINE CLINIC | Facility: CLINIC | Age: 68
End: 2023-01-09

## 2023-01-09 VITALS
RESPIRATION RATE: 16 BRPM | OXYGEN SATURATION: 97 % | BODY MASS INDEX: 27.11 KG/M2 | HEART RATE: 102 BPM | SYSTOLIC BLOOD PRESSURE: 130 MMHG | HEIGHT: 69 IN | DIASTOLIC BLOOD PRESSURE: 80 MMHG | WEIGHT: 183 LBS

## 2023-01-09 DIAGNOSIS — R73.09 ELEVATED GLUCOSE: ICD-10-CM

## 2023-01-09 DIAGNOSIS — J30.9 ALLERGIC RHINITIS, UNSPECIFIED SEASONALITY, UNSPECIFIED TRIGGER: ICD-10-CM

## 2023-01-09 DIAGNOSIS — E78.2 MIXED HYPERLIPIDEMIA: Primary | ICD-10-CM

## 2023-01-09 DIAGNOSIS — J45.20 MILD INTERMITTENT ASTHMA WITHOUT COMPLICATION: ICD-10-CM

## 2023-01-09 DIAGNOSIS — T78.40XS ALLERGIC DISORDER, SEQUELA: ICD-10-CM

## 2023-01-09 PROBLEM — L03.119 CELLULITIS OF LOWER EXTREMITY: Status: RESOLVED | Noted: 2022-11-03 | Resolved: 2023-01-09

## 2023-01-09 PROBLEM — I10 PRIMARY HYPERTENSION: Status: RESOLVED | Noted: 2022-09-22 | Resolved: 2023-01-09

## 2023-01-09 RX ORDER — ATORVASTATIN CALCIUM 10 MG/1
10 TABLET, FILM COATED ORAL DAILY
Qty: 90 TABLET | Refills: 3 | Status: SHIPPED | OUTPATIENT
Start: 2023-01-09

## 2023-01-09 RX ORDER — ALBUTEROL SULFATE 90 UG/1
2 AEROSOL, METERED RESPIRATORY (INHALATION) EVERY 6 HOURS PRN
Qty: 8.5 G | Refills: 3 | Status: SHIPPED | OUTPATIENT
Start: 2023-01-09

## 2023-01-09 RX ORDER — FLUTICASONE PROPIONATE 50 MCG
2 SPRAY, SUSPENSION (ML) NASAL DAILY
Qty: 48 ML | Refills: 3 | Status: SHIPPED | OUTPATIENT
Start: 2023-01-09

## 2023-01-09 NOTE — PROGRESS NOTES
Name: Nichole Delgado      : 1907      MRN: [id number]  Encounter Provider: Latonia Dennis MD  Encounter Date: 2023   Encounter department: [facility]     1  Mixed hyperlipidemia  Assessment & Plan:  Lab Results   Component Value Date    LDLCALC 101 (H) 2022   much better , cont lipitor , and low fat diet       Orders:  -     atorvastatin (LIPITOR) 10 mg tablet; Take 1 tablet (10 mg total) by mouth daily  -     CBC and differential; Future; Expected date: 2023  -     Comprehensive metabolic panel; Future; Expected date: 2023  -     Lipid panel; Future; Expected date: 2023  -     TSH, 3rd generation; Future; Expected date: 2023    2  Mild intermittent asthma without complication  Assessment & Plan:  Discussed about getting pneumonia vaccine, he was concerned as he has some allergies    Orders:  -     albuterol (PROVENTIL HFA,VENTOLIN HFA) 90 mcg/act inhaler; Inhale 2 puffs every 6 (six) hours as needed for wheezing    3  Allergic rhinitis, unspecified seasonality, unspecified trigger  Assessment & Plan:  Continue Flonase as needed      4  Allergic disorder, sequela  -     fluticasone (FLONASE) 50 mcg/act nasal spray; 2 sprays into each nostril daily    5  Elevated glucose  Assessment & Plan:  His fasting glucose is elevated, advised to have low-carb diet and recheck in 6-month  Lab Results   Component Value Date    HGBA1C 6 0 (H) 2022         Orders:  -     Comprehensive metabolic panel; Future; Expected date: 2023  -     Hemoglobin A1C; Future; Expected date: 2023      BMI Counseling: Body mass index is 27 42 kg/m²  The BMI is above normal  Nutrition recommendations include decreasing portion sizes, moderation in carbohydrate intake and reducing intake of cholesterol  Exercise recommendations include strength training exercises  Rationale for BMI follow-up plan is due to patient being overweight or obese  Depression Screening and Follow-up Plan: Patient was screened for depression during today's encounter  They screened negative with a PHQ-2 score of 0  Subjective     He is here to follow-up on his lab, he says he has been eating healthier diet and taking cholesterol medicine  Asthma is under control he needs refill on inhaler he has seasonal allergies and want to get the refill on Flonase  Denies any chest pain, shortness of breath lightheaded or dizziness  Review of Systems   Constitutional: Negative for activity change, appetite change, chills, fatigue, fever and unexpected weight change  HENT: Negative for congestion, ear discharge, ear pain, nosebleeds, postnasal drip, rhinorrhea, sinus pressure, sneezing, sore throat, trouble swallowing and voice change  Eyes: Negative for photophobia, pain, discharge, redness and itching  Respiratory: Negative for cough, chest tightness, shortness of breath and wheezing  Cardiovascular: Negative for chest pain, palpitations and leg swelling  Gastrointestinal: Negative for abdominal pain, constipation, diarrhea, nausea and vomiting  Endocrine: Negative for polyuria  Genitourinary: Negative for dysuria, frequency and urgency  Musculoskeletal: Negative for arthralgias, back pain, myalgias and neck pain  Skin: Negative for color change, pallor and rash  Allergic/Immunologic: Negative for environmental allergies and food allergies  Neurological: Negative for dizziness, weakness, light-headedness and headaches  Hematological: Negative for adenopathy  Does not bruise/bleed easily  Psychiatric/Behavioral: Negative for behavioral problems and sleep disturbance  The patient is not nervous/anxious          Past Medical History:   Diagnosis Date   • Allergic rhinitis 01/13/2011   • Anxiety    • Asthma    • Insomnia    • Kidney stone    • Myalgia and Myositis 11/13/2007   • Pain     chronic neck,back, spine   • Psychiatric disorder    • Temporomandibular joint disorder 01/06/2009     Past Surgical History:   Procedure Laterality Date   • KIDNEY STONE SURGERY      pt poor historian     Family History   Problem Relation Age of Onset   • Cancer Mother    • No Known Problems Father      Social History     Socioeconomic History   • Marital status: /Civil Union     Spouse name: None   • Number of children: 1   • Years of education: None   • Highest education level: None   Occupational History   • Occupation: On disability   Tobacco Use   • Smoking status: Never   • Smokeless tobacco: Never   Substance and Sexual Activity   • Alcohol use: No   • Drug use: No   • Sexual activity: None   Other Topics Concern   • None   Social History Narrative   • None     Social Determinants of Health     Financial Resource Strain: Not on file   Food Insecurity: Not on file   Transportation Needs: Not on file   Physical Activity: Not on file   Stress: Not on file   Social Connections: Not on file   Intimate Partner Violence: Not on file   Housing Stability: Not on file     Current Outpatient Medications on File Prior to Visit   Medication Sig   • clonazePAM (KlonoPIN) 0 5 MG disintegrating tablet DISSOLVE 1 TABLET BY MOUTH EVERY DAY   • clonazePAM (KlonoPIN) 0 5 mg tablet Take by mouth 4 (four) times a day as needed   • clotrimazole (LOTRIMIN) 1 % cream Apply topically 2 (two) times a day   • [facility] ALLERGY 25 MG capsule Take 1 capsule (25 mg total) by mouth daily at bedtime   • cyclobenzaprine (FLEXERIL) 10 mg tablet Take 10 mg by mouth 3 (three) times a day as needed for muscle spasms   • diclofenac sodium (VOLTAREN) 1 % APPLY TO AFFECTED AREA EVERY DAY   • diphenhydrAMINE (BENADRYL) 25 mg tablet Take 1 tablet (25 mg total) by mouth every 6 (six) hours as needed for itching (Breakthrough Itching)   • EPINEPHrine (EPIPEN 2-ARUN) 0 3 mg/0 3 mL SOAJ Use in case of anaphylaxis reaction   • fexofenadine (ALLEGRA) 180 MG tablet Take 1 tablet (180 mg total) by mouth 2 (two) times a day   • fexofenadine (ALLEGRA) 60 MG tablet Take 1 tablet (60 mg total) by mouth 2 (two) times a day   • ibuprofen (MOTRIN) 200 mg tablet Take 200 mg by mouth every 6 (six) hours as needed for mild pain    • lidocaine (LIDODERM) 5 % Place 1 patch on the skin every 24 hours Remove & Discard patch within 12 hours or as directed by MD   • Menthol, Topical Analgesic, 16 % GEL Apply topically  • olopatadine (PATANOL) 0 1 % ophthalmic solution Administer 1 drop to both eyes 2 (two) times a day   • zolpidem (AMBIEN) 10 mg tablet    • [DISCONTINUED] albuterol (PROVENTIL HFA,VENTOLIN HFA) 90 mcg/act inhaler Inhale 2 puffs every 6 (six) hours as needed for wheezing   • [DISCONTINUED] atorvastatin (LIPITOR) 10 mg tablet Take 1 tablet (10 mg total) by mouth daily   • [DISCONTINUED] fluticasone (FLONASE) 50 mcg/act nasal spray SPRAY 2 SPRAYS INTO EACH NOSTRIL EVERY DAY     Allergies   Allergen Reactions   • Insect Extract Allergy Skin Test Hives   • Levofloxacin Hives   • Penicillins Hives     Reaction Date: [id number];      Immunization History   Administered Date(s) Administered   • COVID-19 PFIZER VACCINE 0 3 ML IM 04/03/2021, 04/24/2021   • COVID-19 Pfizer vac (Ez-sucrose, gray cap) 12 yr+ IM 01/30/2022   • INFLUENZA 09/27/2018, 09/12/2022   • Influenza Injectable, MDCK, Preservative Free, Quadrivalent, 0 5 mL 09/30/2019   • Influenza, injectable, quadrivalent, preservative free 0 5 mL 09/04/2020       Objective     /80   Pulse 102   Resp 16   Ht 5' 8 5" (1 74 m)   Wt 83 kg (183 lb)   SpO2 97%   BMI 27 42 kg/m²     Physical Exam  Vitals and nursing note reviewed  Constitutional:       Appearance: He is well-developed  HENT:      Head: Normocephalic and atraumatic  Eyes:      General: No scleral icterus  Conjunctiva/sclera: Conjunctivae normal       Pupils: Pupils are equal, round, and reactive to light  Neck:      Thyroid: No thyromegaly     Cardiovascular:      Rate and Rhythm: Normal rate and regular rhythm  Heart sounds: Normal heart sounds  No murmur heard  Pulmonary:      Effort: Pulmonary effort is normal       Breath sounds: Normal breath sounds  No wheezing or rales  Musculoskeletal:      Cervical back: Normal range of motion and neck supple  Right lower leg: No edema  Left lower leg: No edema  Lymphadenopathy:      Cervical: No cervical adenopathy  Skin:     Findings: No erythema or rash  Neurological:      Mental Status: He is alert         Laurie Childress MD

## 2023-01-09 NOTE — ASSESSMENT & PLAN NOTE
His fasting glucose is elevated, advised to have low-carb diet and recheck in 6-month  Lab Results   Component Value Date    HGBA1C 6 0 (H) 12/22/2022

## 2023-01-09 NOTE — ASSESSMENT & PLAN NOTE
Lab Results   Component Value Date    LDLCALC 101 (H) 12/22/2022   much better , cont lipitor , and low fat diet

## 2023-01-24 ENCOUNTER — TELEPHONE (OUTPATIENT)
Dept: FAMILY MEDICINE CLINIC | Facility: CLINIC | Age: 68
End: 2023-01-24

## 2023-02-28 ENCOUNTER — OFFICE VISIT (OUTPATIENT)
Dept: FAMILY MEDICINE CLINIC | Facility: CLINIC | Age: 68
End: 2023-02-28

## 2023-02-28 VITALS
HEART RATE: 98 BPM | OXYGEN SATURATION: 99 % | SYSTOLIC BLOOD PRESSURE: 130 MMHG | HEIGHT: 69 IN | DIASTOLIC BLOOD PRESSURE: 78 MMHG | WEIGHT: 186 LBS | BODY MASS INDEX: 27.55 KG/M2

## 2023-02-28 DIAGNOSIS — T78.40XS ALLERGIC DISORDER, SEQUELA: ICD-10-CM

## 2023-02-28 DIAGNOSIS — B35.6 TINEA CRURIS: Primary | ICD-10-CM

## 2023-02-28 DIAGNOSIS — L50.0 ALLERGIC URTICARIA: ICD-10-CM

## 2023-02-28 RX ORDER — CLOTRIMAZOLE 1 %
CREAM (GRAM) TOPICAL 2 TIMES DAILY
Qty: 30 G | Refills: 0 | Status: SHIPPED | OUTPATIENT
Start: 2023-02-28 | End: 2023-03-07 | Stop reason: SDUPTHER

## 2023-02-28 RX ORDER — FLUCONAZOLE 150 MG/1
150 TABLET ORAL ONCE
Qty: 2 TABLET | Refills: 0 | Status: SHIPPED | OUTPATIENT
Start: 2023-02-28 | End: 2023-02-28

## 2023-02-28 RX ORDER — D-METHORPHAN/PE/ACETAMINOPHEN 5-325MG/15
25 LIQUID (ML) ORAL
Qty: 30 CAPSULE | Refills: 1 | Status: SHIPPED | OUTPATIENT
Start: 2023-02-28

## 2023-02-28 NOTE — PROGRESS NOTES
Name: Alvin Martinez      :       MRN: [id number]  Encounter Provider: Dianna Mann MD  Encounter Date: 2023   Encounter department: [facility]   PA student present in room during exam  1  Tinea cruris  Assessment & Plan:  He gets frequent rashes, most likely fungal, will start him on Diflucan oral tablet and advised to take 1 tablet now  And then after  3  days and topical clotrimazole cream, and he will take the antihistamine during daytime nondrowsy    Orders:  -     fluconazole (DIFLUCAN) 150 mg tablet; Take 1 tablet (150 mg total) by mouth once for 1 dose Repeat after 3 days one more tab  -     clotrimazole (LOTRIMIN) 1 % cream; Apply topically 2 (two) times a day    2  Allergic urticaria  -     Ambulatory Referral to Allergy; Future    3  Allergic disorder, sequela  -     [facility] 25 MG capsule; Take 1 capsule (25 mg total) by mouth daily at bedtime       Discussed about seeing the allergist as he keeps getting frequent rashes and dermatologist    Subjective     He has multiple time rashes on his groin and genital area, he says now they start few days ago with the itching on his thigh area and then spread to his genital and DC some spots on his glans, he also feel itchy on his lower abdomen, denies any fever or chills, he has not seen the dermatologist, as he was told to see last time he takes Benadryl at bedtime    Review of Systems   Constitutional: Negative for activity change, appetite change, chills, fatigue, fever and unexpected weight change  HENT: Negative for congestion, ear discharge, ear pain, nosebleeds, postnasal drip, rhinorrhea, sinus pressure, sneezing, sore throat, trouble swallowing and voice change  Eyes: Negative for photophobia, pain, discharge, redness and itching  Respiratory: Negative for cough, chest tightness, shortness of breath and wheezing      Cardiovascular: Negative for chest pain, palpitations and leg swelling  Gastrointestinal: Negative for abdominal pain, constipation, diarrhea, nausea and vomiting  Endocrine: Negative for polyuria  Genitourinary: Negative for dysuria, frequency and urgency  Musculoskeletal: Negative for arthralgias, back pain, myalgias and neck pain  Skin: Negative for color change, pallor and rash  Allergic/Immunologic: Negative for environmental allergies and food allergies  Neurological: Negative for dizziness, weakness, light-headedness and headaches  Hematological: Negative for adenopathy  Does not bruise/bleed easily  Psychiatric/Behavioral: Negative for behavioral problems and sleep disturbance  The patient is not nervous/anxious          Past Medical History:   Diagnosis Date   • Allergic rhinitis 01/13/2011   • Anxiety    • Asthma    • Insomnia    • Kidney stone    • Myalgia and Myositis 11/13/2007   • Pain     chronic neck,back, spine   • Psychiatric disorder    • Temporomandibular joint disorder 01/06/2009     Past Surgical History:   Procedure Laterality Date   • KIDNEY STONE SURGERY      pt poor historian     Family History   Problem Relation Age of Onset   • Cancer Mother    • No Known Problems Father      Social History     Socioeconomic History   • Marital status: /Civil Union     Spouse name: None   • Number of children: 1   • Years of education: None   • Highest education level: None   Occupational History   • Occupation: On disability   Tobacco Use   • Smoking status: Never   • Smokeless tobacco: Never   Substance and Sexual Activity   • Alcohol use: No   • Drug use: No   • Sexual activity: None   Other Topics Concern   • None   Social History Narrative   • None     Social Determinants of Health     Financial Resource Strain: Not on file   Food Insecurity: Not on file   Transportation Needs: Not on file   Physical Activity: Not on file   Stress: Not on file   Social Connections: Not on file   Intimate Partner Violence: Not on file   Housing Stability: Not on file     Current Outpatient Medications on File Prior to Visit   Medication Sig   • albuterol (PROVENTIL HFA,VENTOLIN HFA) 90 mcg/act inhaler Inhale 2 puffs every 6 (six) hours as needed for wheezing   • atorvastatin (LIPITOR) 10 mg tablet Take 1 tablet (10 mg total) by mouth daily   • clonazePAM (KlonoPIN) 0 5 MG disintegrating tablet DISSOLVE 1 TABLET BY MOUTH EVERY DAY   • clonazePAM (KlonoPIN) 0 5 mg tablet Take by mouth 4 (four) times a day as needed   • cyclobenzaprine (FLEXERIL) 10 mg tablet Take 10 mg by mouth 3 (three) times a day as needed for muscle spasms   • diclofenac sodium (VOLTAREN) 1 % APPLY TO AFFECTED AREA EVERY DAY   • EPINEPHrine (EPIPEN 2-ARUN) 0 3 mg/0 3 mL SOAJ Use in case of anaphylaxis reaction   • fexofenadine (ALLEGRA) 180 MG tablet Take 1 tablet (180 mg total) by mouth 2 (two) times a day   • fexofenadine (ALLEGRA) 60 MG tablet Take 1 tablet (60 mg total) by mouth 2 (two) times a day   • fluticasone (FLONASE) 50 mcg/act nasal spray 2 sprays into each nostril daily   • ibuprofen (MOTRIN) 200 mg tablet Take 200 mg by mouth every 6 (six) hours as needed for mild pain    • lidocaine (LIDODERM) 5 % Place 1 patch on the skin every 24 hours Remove & Discard patch within 12 hours or as directed by MD   • Menthol, Topical Analgesic, 16 % GEL Apply topically     • olopatadine (PATANOL) 0 1 % ophthalmic solution Administer 1 drop to both eyes 2 (two) times a day   • zolpidem (AMBIEN) 10 mg tablet    • [DISCONTINUED] clotrimazole (LOTRIMIN) 1 % cream Apply topically 2 (two) times a day   • [DISCONTINUED] [facility] 25 MG capsule Take 1 capsule (25 mg total) by mouth daily at bedtime   • diphenhydrAMINE (BENADRYL) 25 mg tablet Take 1 tablet (25 mg total) by mouth every 6 (six) hours as needed for itching (Breakthrough Itching) (Patient not taking: Reported on 2/28/2023)     Allergies   Allergen Reactions   • Insect Extract Allergy Skin Test Hives   • Levofloxacin Hives   • Penicillins Hives Reaction Date: 13Nov2007;      Immunization History   Administered Date(s) Administered   • COVID-19 PFIZER VACCINE 0 3 ML IM 04/03/2021, 04/24/2021   • COVID-19 Pfizer vac (Ez-sucrose, gray cap) 12 yr+ IM 01/30/2022   • INFLUENZA 09/27/2018, 09/12/2022   • Influenza Injectable, MDCK, Preservative Free, Quadrivalent, 0 5 mL 09/30/2019   • Influenza, injectable, quadrivalent, preservative free 0 5 mL 09/04/2020       Objective     /78   Pulse 98   Ht 5' 8 5" (1 74 m)   Wt 84 4 kg (186 lb)   SpO2 99%   BMI 27 87 kg/m²     Physical Exam  Vitals and nursing note reviewed  Constitutional:       Appearance: He is well-developed  HENT:      Head: Normocephalic and atraumatic  Right Ear: External ear normal       Left Ear: External ear normal    Eyes:      General: No scleral icterus  Conjunctiva/sclera: Conjunctivae normal       Pupils: Pupils are equal, round, and reactive to light  Neck:      Thyroid: No thyromegaly  Cardiovascular:      Rate and Rhythm: Normal rate and regular rhythm  Heart sounds: Normal heart sounds  No murmur heard  Pulmonary:      Effort: Pulmonary effort is normal       Breath sounds: Normal breath sounds  No wheezing or rales  Musculoskeletal:      Cervical back: Normal range of motion and neck supple  Right lower leg: No edema  Left lower leg: No edema  Lymphadenopathy:      Cervical: No cervical adenopathy  Skin:     Findings: Rash present  No erythema  Comments: maculPapular rash on thighs and glans ,    Neurological:      Mental Status: He is alert     Psychiatric:         Mood and Affect: Mood normal        Brittney Nowak MD

## 2023-02-28 NOTE — ASSESSMENT & PLAN NOTE
He gets frequent rashes, most likely fungal, will start him on Diflucan oral tablet and advised to take 1 tablet now  And then after  3  days and topical clotrimazole cream, and he will take the antihistamine during daytime nondrowsy

## 2023-03-07 ENCOUNTER — TELEPHONE (OUTPATIENT)
Dept: FAMILY MEDICINE CLINIC | Facility: CLINIC | Age: 68
End: 2023-03-07

## 2023-03-07 DIAGNOSIS — L50.0 ALLERGIC URTICARIA: Primary | ICD-10-CM

## 2023-03-07 DIAGNOSIS — B35.6 TINEA CRURIS: ICD-10-CM

## 2023-03-07 RX ORDER — CLOTRIMAZOLE 1 %
CREAM (GRAM) TOPICAL 2 TIMES DAILY
Qty: 60 G | Refills: 0 | Status: SHIPPED | OUTPATIENT
Start: 2023-03-07

## 2023-03-07 RX ORDER — PREDNISONE 10 MG/1
TABLET ORAL
Qty: 26 TABLET | Refills: 0 | Status: SHIPPED | OUTPATIENT
Start: 2023-03-07 | End: 2023-07-17 | Stop reason: ALTCHOICE

## 2023-03-07 NOTE — TELEPHONE ENCOUNTER
Patient called to say that he was in for a rash, he said what she gave him is not working  He wants to know if the doctor can call in what she called in before, the prednisone and antibiotic    Please advise

## 2023-03-20 ENCOUNTER — TELEPHONE (OUTPATIENT)
Dept: DERMATOLOGY | Facility: CLINIC | Age: 68
End: 2023-03-20

## 2023-03-20 NOTE — TELEPHONE ENCOUNTER
NP calling for apt as f/u from PCP for rash; states medication cleared up skin but still has some spots  Informed him that our timeframe in [facility] is August for NP apts or Sep in [facility], unless pcp wants pt seen sooner  Pt states will contact pcp to see about expediting an apt for him

## 2023-04-01 DIAGNOSIS — T78.40XS ALLERGIC DISORDER, SEQUELA: ICD-10-CM

## 2023-04-03 RX ORDER — FLUTICASONE PROPIONATE 50 MCG
SPRAY, SUSPENSION (ML) NASAL
Qty: 48 ML | Refills: 2 | Status: SHIPPED | OUTPATIENT
Start: 2023-04-03

## 2023-05-23 DIAGNOSIS — T78.40XS ALLERGIC DISORDER, SEQUELA: ICD-10-CM

## 2023-05-23 RX ORDER — DIPHENHYDRAMINE HYDROCHLORIDE 25 MG/1
CAPSULE ORAL
Qty: 30 CAPSULE | Refills: 1 | Status: SHIPPED | OUTPATIENT
Start: 2023-05-23

## 2023-07-07 ENCOUNTER — RA CDI HCC (OUTPATIENT)
Dept: OTHER | Facility: HOSPITAL | Age: 68
End: 2023-07-07

## 2023-07-07 NOTE — PROGRESS NOTES
[address] coding opportunities       Chart reviewed, no opportunity found: CHART REVIEWED, NO OPPORTUNITY FOUND        Patients Insurance     Medicare Insurance: Medicare

## 2023-07-11 LAB
ALBUMIN SERPL-MCNC: 4.3 G/DL (ref 3.6–5.1)
ALBUMIN/GLOB SERPL: 1.5 (CALC) (ref 1–2.5)
ALP SERPL-CCNC: 88 U/L (ref 35–144)
ALT SERPL-CCNC: 25 U/L (ref 9–46)
AST SERPL-CCNC: 21 U/L (ref 10–35)
BASOPHILS # BLD AUTO: 53 CELLS/UL (ref 0–200)
BASOPHILS NFR BLD AUTO: 0.7 %
BILIRUB SERPL-MCNC: 0.5 MG/DL (ref 0.2–1.2)
BUN SERPL-MCNC: 14 MG/DL (ref 7–25)
BUN/CREAT SERPL: ABNORMAL (CALC) (ref 6–22)
CALCIUM SERPL-MCNC: 9.7 MG/DL (ref 8.6–10.3)
CHLORIDE SERPL-SCNC: 104 MMOL/L (ref 98–110)
CHOLEST SERPL-MCNC: 182 MG/DL
CHOLEST/HDLC SERPL: 4.1 (CALC)
CO2 SERPL-SCNC: 28 MMOL/L (ref 20–32)
CREAT SERPL-MCNC: 0.78 MG/DL (ref 0.7–1.35)
EOSINOPHIL # BLD AUTO: 91 CELLS/UL (ref 15–500)
EOSINOPHIL NFR BLD AUTO: 1.2 %
ERYTHROCYTE [DISTWIDTH] IN BLOOD BY AUTOMATED COUNT: 12.5 % (ref 11–15)
GFR/BSA.PRED SERPLBLD CYS-BASED-ARV: 98 ML/MIN/1.73M2
GLOBULIN SER CALC-MCNC: 2.8 G/DL (CALC) (ref 1.9–3.7)
GLUCOSE SERPL-MCNC: 117 MG/DL (ref 65–99)
HBA1C MFR BLD: 6.1 % OF TOTAL HGB
HCT VFR BLD AUTO: 48.8 % (ref 38.5–50)
HDLC SERPL-MCNC: 44 MG/DL
HGB BLD-MCNC: 16.1 G/DL (ref 13.2–17.1)
LDLC SERPL CALC-MCNC: 115 MG/DL (CALC)
LYMPHOCYTES # BLD AUTO: 1414 CELLS/UL (ref 850–3900)
LYMPHOCYTES NFR BLD AUTO: 18.6 %
MCH RBC QN AUTO: 28 PG (ref 27–33)
MCHC RBC AUTO-ENTMCNC: 33 G/DL (ref 32–36)
MCV RBC AUTO: 85 FL (ref 80–100)
MONOCYTES # BLD AUTO: 471 CELLS/UL (ref 200–950)
MONOCYTES NFR BLD AUTO: 6.2 %
NEUTROPHILS # BLD AUTO: 5571 CELLS/UL (ref 1500–7800)
NEUTROPHILS NFR BLD AUTO: 73.3 %
NONHDLC SERPL-MCNC: 138 MG/DL (CALC)
PLATELET # BLD AUTO: 318 THOUSAND/UL (ref 140–400)
PMV BLD REES-ECKER: 9.5 FL (ref 7.5–12.5)
POTASSIUM SERPL-SCNC: 5 MMOL/L (ref 3.5–5.3)
PROT SERPL-MCNC: 7.1 G/DL (ref 6.1–8.1)
RBC # BLD AUTO: 5.74 MILLION/UL (ref 4.2–5.8)
SODIUM SERPL-SCNC: 139 MMOL/L (ref 135–146)
TRIGL SERPL-MCNC: 115 MG/DL
TSH SERPL-ACNC: 1.22 MIU/L (ref 0.4–4.5)
WBC # BLD AUTO: 7.6 THOUSAND/UL (ref 3.8–10.8)

## 2023-07-17 ENCOUNTER — OFFICE VISIT (OUTPATIENT)
Dept: FAMILY MEDICINE CLINIC | Facility: CLINIC | Age: 68
End: 2023-07-17
Payer: MEDICARE

## 2023-07-17 VITALS
OXYGEN SATURATION: 99 % | BODY MASS INDEX: 26.96 KG/M2 | DIASTOLIC BLOOD PRESSURE: 82 MMHG | SYSTOLIC BLOOD PRESSURE: 126 MMHG | WEIGHT: 182 LBS | HEIGHT: 69 IN | HEART RATE: 102 BPM

## 2023-07-17 DIAGNOSIS — E78.2 MIXED HYPERLIPIDEMIA: Primary | ICD-10-CM

## 2023-07-17 DIAGNOSIS — T78.40XS ALLERGIC DISORDER, SEQUELA: ICD-10-CM

## 2023-07-17 DIAGNOSIS — R73.09 ELEVATED GLUCOSE: ICD-10-CM

## 2023-07-17 DIAGNOSIS — Z00.00 MEDICARE ANNUAL WELLNESS VISIT, SUBSEQUENT: ICD-10-CM

## 2023-07-17 DIAGNOSIS — J45.20 MILD INTERMITTENT ASTHMA WITHOUT COMPLICATION: ICD-10-CM

## 2023-07-17 PROCEDURE — 99214 OFFICE O/P EST MOD 30 MIN: CPT | Performed by: FAMILY MEDICINE

## 2023-07-17 PROCEDURE — G0439 PPPS, SUBSEQ VISIT: HCPCS | Performed by: FAMILY MEDICINE

## 2023-07-17 RX ORDER — FLUTICASONE PROPIONATE 50 MCG
2 SPRAY, SUSPENSION (ML) NASAL DAILY
Qty: 48 ML | Refills: 2 | Status: SHIPPED | OUTPATIENT
Start: 2023-07-17

## 2023-07-17 RX ORDER — FLUOCINOLONE ACETONIDE 0.25 MG/G
CREAM TOPICAL
COMMUNITY
Start: 2023-05-03

## 2023-07-17 RX ORDER — DIPHENHYDRAMINE HYDROCHLORIDE 25 MG/1
25 CAPSULE ORAL
Qty: 30 CAPSULE | Refills: 5 | Status: SHIPPED | OUTPATIENT
Start: 2023-07-17

## 2023-07-17 RX ORDER — ALBUTEROL SULFATE 90 UG/1
2 AEROSOL, METERED RESPIRATORY (INHALATION) EVERY 6 HOURS PRN
Qty: 8.5 G | Refills: 3 | Status: SHIPPED | OUTPATIENT
Start: 2023-07-17

## 2023-07-17 NOTE — ASSESSMENT & PLAN NOTE
His blood sugar remains elevated, discussed with him to cut down carbs and try to lose some more weight,

## 2023-07-17 NOTE — PATIENT INSTRUCTIONS
Medicare Preventive Visit Patient Instructions  Thank you for completing your Welcome to Medicare Visit or Medicare Annual Wellness Visit today. Your next wellness visit will be due in one year (7/17/2024). The screening/preventive services that you may require over the next 5-10 years are detailed below. Some tests may not apply to you based off risk factors and/or age. Screening tests ordered at today's visit but not completed yet may show as past due. Also, please note that scanned in results may not display below. Preventive Screenings:  Service Recommendations Previous Testing/Comments   Colorectal Cancer Screening  · Colonoscopy    · Fecal Occult Blood Test (FOBT)/Fecal Immunochemical Test (FIT)  · Fecal DNA/Cologuard Test  · Flexible Sigmoidoscopy Age: 43-73 years old   Colonoscopy: every 10 years (May be performed more frequently if at higher risk)  OR  FOBT/FIT: every 1 year  OR  Cologuard: every 3 years  OR  Sigmoidoscopy: every 5 years  Screening may be recommended earlier than age 39 if at higher risk for colorectal cancer. Also, an individualized decision between you and your healthcare provider will decide whether screening between the ages of 77-80 would be appropriate.  Colonoscopy: 04/27/2022  FOBT/FIT: Not on file  Cologuard: Not on file  Sigmoidoscopy: Not on file    Screening Current     Prostate Cancer Screening Individualized decision between patient and health care provider in men between ages of 53-66   Medicare will cover every 12 months beginning on the day after your 50th birthday PSA: No results in last 5 years     Screening Not Indicated     Hepatitis C Screening Once for adults born between 1945 and 1965  More frequently in patients at high risk for Hepatitis C Hep C Antibody: 09/09/2022    Screening Current   Diabetes Screening 1-2 times per year if you're at risk for diabetes or have pre-diabetes Fasting glucose: No results in last 5 years (No results in last 5 years)  A1C: 6.1 % of total Hgb (7/10/2023)  Screening Current   Cholesterol Screening Once every 5 years if you don't have a lipid disorder. May order more often based on risk factors. Lipid panel: 07/10/2023  Screening Not Indicated  History Lipid Disorder      Other Preventive Screenings Covered by Medicare:  1. Abdominal Aortic Aneurysm (AAA) Screening: covered once if your at risk. You're considered to be at risk if you have a family history of AAA or a male between the age of 70-76 who smoking at least 100 cigarettes in your lifetime. 2. Lung Cancer Screening: covers low dose CT scan once per year if you meet all of the following conditions: (1) Age 48-67; (2) No signs or symptoms of lung cancer; (3) Current smoker or have quit smoking within the last 15 years; (4) You have a tobacco smoking history of at least 20 pack years (packs per day x number of years you smoked); (5) You get a written order from a healthcare provider. 3. Glaucoma Screening: covered annually if you're considered high risk: (1) You have diabetes OR (2) Family history of glaucoma OR (3)  aged 48 and older OR (3)  American aged 72 and older  3. Osteoporosis Screening: covered every 2 years if you meet one of the following conditions: (1) Have a vertebral abnormality; (2) On glucocorticoid therapy for more than 3 months; (3) Have primary hyperparathyroidism; (4) On osteoporosis medications and need to assess response to drug therapy. 5. HIV Screening: covered annually if you're between the age of 14-79. Also covered annually if you are younger than 13 and older than 72 with risk factors for HIV infection. For pregnant patients, it is covered up to 3 times per pregnancy.     Immunizations:  Immunization Recommendations   Influenza Vaccine Annual influenza vaccination during flu season is recommended for all persons aged >= 6 months who do not have contraindications   Pneumococcal Vaccine   * Pneumococcal conjugate vaccine = PCV13 (Prevnar 13), PCV15 (Vaxneuvance), PCV20 (Prevnar 20)  * Pneumococcal polysaccharide vaccine = PPSV23 (Pneumovax) Adults 2364 years old: 1-3 doses may be recommended based on certain risk factors  Adults 72 years old: 1-2 doses may be recommended based off what pneumonia vaccine you previously received   Hepatitis B Vaccine 3 dose series if at intermediate or high risk (ex: diabetes, end stage renal disease, liver disease)   Tetanus (Td) Vaccine - COST NOT COVERED BY MEDICARE PART B Following completion of primary series, a booster dose should be given every 10 years to maintain immunity against tetanus. Td may also be given as tetanus wound prophylaxis. Tdap Vaccine - COST NOT COVERED BY MEDICARE PART B Recommended at least once for all adults. For pregnant patients, recommended with each pregnancy. Shingles Vaccine (Shingrix) - COST NOT COVERED BY MEDICARE PART B  2 shot series recommended in those aged 48 and above     Health Maintenance Due:      Topic Date Due   • Colorectal Cancer Screening  04/27/2025   • Hepatitis C Screening  Completed     Immunizations Due:      Topic Date Due   • COVID-19 Vaccine (4 - Pfizer series) 03/27/2022   • Influenza Vaccine (1) 09/01/2023     Advance Directives   What are advance directives? Advance directives are legal documents that state your wishes and plans for medical care. These plans are made ahead of time in case you lose your ability to make decisions for yourself. Advance directives can apply to any medical decision, such as the treatments you want, and if you want to donate organs. What are the types of advance directives? There are many types of advance directives, and each state has rules about how to use them. You may choose a combination of any of the following:  · Living will: This is a written record of the treatment you want. You can also choose which treatments you do not want, which to limit, and which to stop at a certain time.  This includes surgery, medicine, IV fluid, and tube feedings. · Durable power of  for healthcare [facility]): This is a written record that states who you want to make healthcare choices for you when you are unable to make them for yourself. This person, called a proxy, is usually a family member or a friend. You may choose more than 1 proxy. · Do not resuscitate (DNR) order:  A DNR order is used in case your heart stops beating or you stop breathing. It is a request not to have certain forms of treatment, such as CPR. A DNR order may be included in other types of advance directives. · Medical directive: This covers the care that you want if you are in a coma, near death, or unable to make decisions for yourself. You can list the treatments you want for each condition. Treatment may include pain medicine, surgery, blood transfusions, dialysis, IV or tube feedings, and a ventilator (breathing machine). · Values history: This document has questions about your views, beliefs, and how you feel and think about life. This information can help others choose the care that you would choose. Why are advance directives important? An advance directive helps you control your care. Although spoken wishes may be used, it is better to have your wishes written down. Spoken wishes can be misunderstood, or not followed. Treatments may be given even if you do not want them. An advance directive may make it easier for your family to make difficult choices about your care. Weight Management   Why it is important to manage your weight:  Being overweight increases your risk of health conditions such as heart disease, high blood pressure, type 2 diabetes, and certain types of cancer. It can also increase your risk for osteoarthritis, sleep apnea, and other respiratory problems. Aim for a slow, steady weight loss. Even a small amount of weight loss can lower your risk of health problems.   How to lose weight safely:  A safe and healthy way to lose weight is to eat fewer calories and get regular exercise. You can lose up about 1 pound a week by decreasing the number of calories you eat by 500 calories each day. Healthy meal plan for weight management:  A healthy meal plan includes a variety of foods, contains fewer calories, and helps you stay healthy. A healthy meal plan includes the following:  · Eat whole-grain foods more often. A healthy meal plan should contain fiber. Fiber is the part of grains, fruits, and vegetables that is not broken down by your body. Whole-grain foods are healthy and provide extra fiber in your diet. Some examples of whole-grain foods are whole-wheat breads and pastas, oatmeal, brown rice, and bulgur. · Eat a variety of vegetables every day. Include dark, leafy greens such as spinach, kale, santosh greens, and mustard greens. Eat yellow and orange vegetables such as carrots, sweet potatoes, and winter squash. · Eat a variety of fruits every day. Choose fresh or canned fruit (canned in its own juice or light syrup) instead of juice. Fruit juice has very little or no fiber. · Eat low-fat dairy foods. Drink fat-free (skim) milk or 1% milk. Eat fat-free yogurt and low-fat cottage cheese. Try low-fat cheeses such as mozzarella and other reduced-fat cheeses. · Choose meat and other protein foods that are low in fat. Choose beans or other legumes such as split peas or lentils. Choose fish, skinless poultry (chicken or turkey), or lean cuts of red meat (beef or pork). Before you cook meat or poultry, cut off any visible fat. · Use less fat and oil. Try baking foods instead of frying them. Add less fat, such as margarine, sour cream, regular salad dressing and mayonnaise to foods. Eat fewer high-fat foods. Some examples of high-fat foods include french fries, doughnuts, ice cream, and cakes. · Eat fewer sweets. Limit foods and drinks that are high in sugar.  This includes candy, cookies, regular soda, and sweetened drinks. Exercise:  Exercise at least 30 minutes per day on most days of the week. Some examples of exercise include walking, biking, dancing, and swimming. You can also fit in more physical activity by taking the stairs instead of the elevator or parking farther away from stores. Ask your healthcare provider about the best exercise plan for you. © Copyright [facility] 2018 Information is for End User's use only and may not be sold, redistributed or otherwise used for commercial purposes.  All illustrations and images included in CareNotes® are the copyrighted property of [employer]M., Inc. or  [address]

## 2023-07-17 NOTE — PROGRESS NOTES
Assessment and Plan:     Problem List Items Addressed This Visit        Respiratory    Asthma, mild intermittent     Stable          Relevant Medications    albuterol (PROVENTIL HFA,VENTOLIN HFA) 90 mcg/act inhaler       Other    Allergic     seasonal allergies , uses Flonase as needed         Relevant Medications    Banophen 25 MG capsule    fluticasone (FLONASE) 50 mcg/act nasal spray    Elevated glucose     His blood sugar remains elevated, discussed with him to cut down carbs and try to lose some more weight,         Relevant Orders    Comprehensive metabolic panel    Hemoglobin A1C    Hyperlipidemia - Primary    Relevant Orders    CBC and differential    Lipid panel    Medicare annual wellness visit, subsequent       Depression Screening and Follow-up Plan: Patient was screened for depression during today's encounter. They screened negative with a PHQ-2 score of 0. Preventive health issues were discussed with patient, and age appropriate screening tests were ordered as noted in patient's After Visit Summary. Personalized health advice and appropriate referrals for health education or preventive services given if needed, as noted in patient's After Visit Summary. History of Present Illness:     Patient presents for a Medicare Wellness Visit    AWV and follow up, needs refill on his medications, he had labs     Patient Care Team:  David Chowdary MD as PCP - General     Review of Systems:     Review of Systems   Constitutional: Negative for activity change, appetite change, chills, fatigue, fever and unexpected weight change. HENT: Negative for congestion, ear discharge, ear pain, nosebleeds, postnasal drip, rhinorrhea, sinus pressure, sneezing, sore throat, trouble swallowing and voice change. Eyes: Negative for photophobia, pain, discharge, redness and itching. Respiratory: Negative for cough, chest tightness, shortness of breath and wheezing.     Cardiovascular: Negative for chest pain, palpitations and leg swelling. Gastrointestinal: Negative for abdominal pain, constipation, diarrhea, nausea and vomiting. Endocrine: Negative for polyuria. Genitourinary: Negative for dysuria, frequency and urgency. Musculoskeletal: Negative for arthralgias, back pain, myalgias and neck pain. Skin: Negative for color change, pallor and rash. Allergic/Immunologic: Negative for environmental allergies and food allergies. Neurological: Negative for dizziness, weakness, light-headedness and headaches. Hematological: Negative for adenopathy. Does not bruise/bleed easily. Psychiatric/Behavioral: Negative for behavioral problems. The patient is not nervous/anxious.          Problem List:     Patient Active Problem List   Diagnosis   • Asthma, mild intermittent   • Allergic   • Elevated glucose   • Allergic rhinitis   • Allergic urticaria   • Hyperlipidemia   • Tinea cruris   • Allergic contact dermatitis due to plants, except food   • Medicare annual wellness visit, subsequent   • Anxiety   • Urticaria      Past Medical and Surgical History:     Past Medical History:   Diagnosis Date   • Allergic rhinitis 01/13/2011   • Anxiety    • Asthma    • Insomnia    • Kidney stone    • Myalgia and Myositis 11/13/2007   • Pain     chronic neck,back, spine   • Psychiatric disorder    • Temporomandibular joint disorder 01/06/2009     Past Surgical History:   Procedure Laterality Date   • KIDNEY STONE SURGERY      pt poor historian      Family History:     Family History   Problem Relation Age of Onset   • Cancer Mother    • No Known Problems Father       Social History:     Social History     Socioeconomic History   • Marital status: /Civil Union     Spouse name: None   • Number of children: 1   • Years of education: None   • Highest education level: None   Occupational History   • Occupation: On disability   Tobacco Use   • Smoking status: Never   • Smokeless tobacco: Never   Substance and Sexual Activity   • Alcohol use: No   • Drug use: No   • Sexual activity: None   Other Topics Concern   • None   Social History Narrative   • None     Social Determinants of Health     Financial Resource Strain: Low Risk  (7/17/2023)    Overall Financial Resource Strain (CARDIA)    • Difficulty of Paying Living Expenses: Not hard at all   Food Insecurity: Not on file   Transportation Needs: No Transportation Needs (7/17/2023)    PRAPARE - Transportation    • Lack of Transportation (Medical): No    • Lack of Transportation (Non-Medical):  No   Physical Activity: Not on file   Stress: Not on file   Social Connections: Not on file   Intimate Partner Violence: Not on file   Housing Stability: Not on file      Medications and Allergies:     Current Outpatient Medications   Medication Sig Dispense Refill   • albuterol (PROVENTIL HFA,VENTOLIN HFA) 90 mcg/act inhaler Inhale 2 puffs every 6 (six) hours as needed for wheezing 8.5 g 3   • atorvastatin (LIPITOR) 10 mg tablet Take 1 tablet (10 mg total) by mouth daily 90 tablet 3   • Banophen 25 MG capsule Take 1 capsule (25 mg total) by mouth daily at bedtime 30 capsule 5   • clonazePAM (KlonoPIN) 0.5 MG disintegrating tablet DISSOLVE 1 TABLET BY MOUTH EVERY DAY     • clonazePAM (KlonoPIN) 0.5 mg tablet Take by mouth 4 (four) times a day as needed     • clotrimazole (LOTRIMIN) 1 % cream Apply topically 2 (two) times a day 60 g 0   • cyclobenzaprine (FLEXERIL) 10 mg tablet Take 5 mg by mouth 3 (three) times a day as needed for muscle spasms     • diclofenac sodium (VOLTAREN) 1 % APPLY TO AFFECTED AREA EVERY DAY     • Diclofenac Sodium (VOLTAREN) 1 % APPLY 1 APPLICATION ON THE SKIN TWICE A DAY     • EPINEPHrine (EPIPEN 2-ARUN) 0.3 mg/0.3 mL SOAJ Use in case of anaphylaxis reaction 1 each 1   • fexofenadine (ALLEGRA) 180 MG tablet Take 1 tablet (180 mg total) by mouth 2 (two) times a day 60 tablet 1   • fexofenadine (ALLEGRA) 60 MG tablet Take 1 tablet (60 mg total) by mouth 2 (two) times a day 20 tablet 0   • fluocinolone (SYNALAR) 0.025 % cream APPLY TO LEGS TWICE A DAY     • fluticasone (FLONASE) 50 mcg/act nasal spray 2 sprays into each nostril daily 48 mL 2   • ibuprofen (MOTRIN) 200 mg tablet Take 200 mg by mouth every 6 (six) hours as needed for mild pain      • lidocaine (LIDODERM) 5 % Place 1 patch on the skin every 24 hours Remove & Discard patch within 12 hours or as directed by MD     • Menthol, Topical Analgesic, 16 % GEL Apply topically. • olopatadine (PATANOL) 0.1 % ophthalmic solution Administer 1 drop to both eyes 2 (two) times a day 5 mL 0   • zolpidem (AMBIEN) 10 mg tablet        No current facility-administered medications for this visit. Allergies   Allergen Reactions   • Insect Extract Allergy Skin Test Hives   • Levofloxacin Hives   • Penicillins Hives     Reaction Date: [id number];       Immunizations:     Immunization History   Administered Date(s) Administered   • COVID-19 PFIZER VACCINE 0.3 ML IM 04/03/2021, 04/24/2021   • COVID-19 Pfizer vac (Ez-sucrose, gray cap) 12 yr+ IM 01/30/2022   • INFLUENZA 09/27/2018, 09/12/2022   • Influenza Injectable, MDCK, Preservative Free, Quadrivalent, 0.5 mL 09/30/2019   • Influenza, injectable, quadrivalent, preservative free 0.5 mL 09/04/2020      Health Maintenance:         Topic Date Due   • Colorectal Cancer Screening  04/27/2025   • Hepatitis C Screening  Completed         Topic Date Due   • COVID-19 Vaccine (4 - Pfizer series) 03/27/2022   • Influenza Vaccine (1) 09/01/2023      Medicare Screening Tests and Risk Assessments:     Kenyetta Sandhu is here for his Subsequent Wellness visit. Health Risk Assessment:   Patient rates overall health as good. Patient feels that their physical health rating is same. Patient is satisfied with their life. Eyesight was rated as same. Hearing was rated as same. Patient feels that their emotional and mental health rating is same. Patients states they are never, rarely angry.  Patient states they are never, rarely unusually tired/fatigued. Pain experienced in the last 7 days has been some. Patient's pain rating has been 8/10. Depression Screening:   PHQ-2 Score: 0      Fall Risk Screening: In the past year, patient has experienced: no history of falling in past year      Home Safety:  Patient does not have trouble with stairs inside or outside of their home. Patient has working smoke alarms and has working carbon monoxide detector. Home safety hazards include: none. Nutrition:   Current diet is Regular. Medications:   Patient is currently taking over-the-counter supplements. OTC medications include: see medication list. Patient is able to manage medications. Activities of Daily Living (ADLs)/Instrumental Activities of Daily Living (IADLs):   Walk and transfer into and out of bed and chair?: Yes  Dress and groom yourself?: Yes    Bathe or shower yourself?: Yes    Feed yourself? Yes  Do your laundry/housekeeping?: Yes  Manage your money, pay your bills and track your expenses?: Yes  Make your own meals?: Yes    Do your own shopping?: Yes    Previous Hospitalizations:   Any hospitalizations or ED visits within the last 12 months?: No      Advance Care Planning:   Living will: Yes    Durable POA for healthcare:  Yes    Advanced directive: Yes      Cognitive Screening:   Provider or family/friend/caregiver concerned regarding cognition?: Yes    PREVENTIVE SCREENINGS      Cardiovascular Screening:    General: Screening Not Indicated and History Lipid Disorder      Diabetes Screening:     General: Screening Current      Colorectal Cancer Screening:     General: Screening Current      Prostate Cancer Screening:    General: Screening Not Indicated      Osteoporosis Screening:    General: Screening Not Indicated      Abdominal Aortic Aneurysm (AAA) Screening:    Risk factors include: age between 70-77 yo        Lung Cancer Screening:     General: Screening Not Indicated      Hepatitis C Screening: General: Screening Current    Screening, Brief Intervention, and Referral to Treatment (SBIRT)    Screening  Typical number of drinks in a day: 0  Typical number of drinks in a week: 0  Interpretation: Low risk drinking behavior. Single Item Drug Screening:  How often have you used an illegal drug (including marijuana) or a prescription medication for non-medical reasons in the past year? never    Single Item Drug Screen Score: 0  Interpretation: Negative screen for possible drug use disorder    Other Counseling Topics:   Calcium and vitamin D intake and regular weightbearing exercise. No results found. Physical Exam:     /82   Pulse 102   Ht 5' 8.5" (1.74 m)   Wt 82.6 kg (182 lb)   SpO2 99%   BMI 27.27 kg/m²     Physical Exam  Vitals and nursing note reviewed. Constitutional:       General: He is not in acute distress. Appearance: Normal appearance. HENT:      Head: Normocephalic and atraumatic. Right Ear: Tympanic membrane and ear canal normal. There is no impacted cerumen. Left Ear: Tympanic membrane and ear canal normal. There is no impacted cerumen. Nose: Nose normal. No rhinorrhea. Mouth/Throat:      Mouth: Mucous membranes are moist.      Pharynx: Oropharynx is clear. No oropharyngeal exudate or posterior oropharyngeal erythema. Eyes:      General:         Right eye: No discharge. Left eye: No discharge. Extraocular Movements: Extraocular movements intact. Conjunctiva/sclera: Conjunctivae normal.      Pupils: Pupils are equal, round, and reactive to light. Cardiovascular:      Rate and Rhythm: Normal rate and regular rhythm. Heart sounds: No murmur heard. Pulmonary:      Effort: Pulmonary effort is normal.      Breath sounds: Normal breath sounds. No wheezing or rales. Abdominal:      General: Abdomen is flat. Bowel sounds are normal. There is no distension. Palpations: Abdomen is soft. There is no mass.       Tenderness: There is no abdominal tenderness. There is no guarding. Musculoskeletal:         General: No swelling, tenderness, deformity or signs of injury. Normal range of motion. Cervical back: Normal range of motion and neck supple. Lymphadenopathy:      Cervical: No cervical adenopathy. Skin:     Coloration: Skin is not jaundiced or pale. Findings: No rash. Neurological:      General: No focal deficit present. Mental Status: He is alert and oriented to person, place, and time. Motor: No weakness. Psychiatric:         Mood and Affect: Mood normal.         Behavior: Behavior normal.         Thought Content:  Thought content normal.         Judgment: Judgment normal.          Lacho Javier MD

## 2023-09-15 PROBLEM — Z00.00 MEDICARE ANNUAL WELLNESS VISIT, SUBSEQUENT: Status: RESOLVED | Noted: 2019-01-14 | Resolved: 2023-09-15

## 2023-10-18 ENCOUNTER — HOSPITAL ENCOUNTER (OUTPATIENT)
Dept: RADIOLOGY | Facility: HOSPITAL | Age: 68
Discharge: HOME | End: 2023-10-18
Payer: MEDICARE

## 2023-10-18 DIAGNOSIS — M19.90 ARTHRITIS: ICD-10-CM

## 2023-10-18 PROCEDURE — 73130 X-RAY EXAM OF HAND: CPT

## 2023-11-02 ENCOUNTER — NEW PATIENT (OUTPATIENT)
Dept: URBAN - METROPOLITAN AREA CLINIC 6 | Facility: CLINIC | Age: 68
End: 2023-11-02

## 2023-11-02 DIAGNOSIS — H04.123: ICD-10-CM

## 2023-11-02 DIAGNOSIS — H25.813: ICD-10-CM

## 2023-11-02 DIAGNOSIS — H10.013: ICD-10-CM

## 2023-11-02 PROCEDURE — 92002 INTRM OPH EXAM NEW PATIENT: CPT

## 2023-11-02 ASSESSMENT — VISUAL ACUITY
OD_SC: 20/30
OS_SC: 20/25

## 2023-11-02 ASSESSMENT — TONOMETRY
OS_IOP_MMHG: 10
OD_IOP_MMHG: 12

## 2023-12-24 DIAGNOSIS — E78.2 MIXED HYPERLIPIDEMIA: ICD-10-CM

## 2023-12-26 RX ORDER — ATORVASTATIN CALCIUM 10 MG/1
10 TABLET, FILM COATED ORAL DAILY
Qty: 90 TABLET | Refills: 1 | Status: SHIPPED | OUTPATIENT
Start: 2023-12-26

## 2024-01-17 LAB
ALBUMIN SERPL-MCNC: 4.5 G/DL (ref 3.6–5.1)
ALBUMIN/GLOB SERPL: 1.7 (CALC) (ref 1–2.5)
ALP SERPL-CCNC: 91 U/L (ref 35–144)
ALT SERPL-CCNC: 20 U/L (ref 9–46)
AST SERPL-CCNC: 15 U/L (ref 10–35)
BASOPHILS # BLD AUTO: 58 CELLS/UL (ref 0–200)
BASOPHILS NFR BLD AUTO: 0.7 %
BILIRUB SERPL-MCNC: 0.5 MG/DL (ref 0.2–1.2)
BUN SERPL-MCNC: 10 MG/DL (ref 7–25)
BUN/CREAT SERPL: ABNORMAL (CALC) (ref 6–22)
CALCIUM SERPL-MCNC: 9.7 MG/DL (ref 8.6–10.3)
CHLORIDE SERPL-SCNC: 104 MMOL/L (ref 98–110)
CHOLEST SERPL-MCNC: 170 MG/DL
CHOLEST/HDLC SERPL: 3.5 (CALC)
CO2 SERPL-SCNC: 29 MMOL/L (ref 20–32)
CREAT SERPL-MCNC: 0.82 MG/DL (ref 0.7–1.35)
EOSINOPHIL # BLD AUTO: 183 CELLS/UL (ref 15–500)
EOSINOPHIL NFR BLD AUTO: 2.2 %
ERYTHROCYTE [DISTWIDTH] IN BLOOD BY AUTOMATED COUNT: 12.5 % (ref 11–15)
GFR/BSA.PRED SERPLBLD CYS-BASED-ARV: 96 ML/MIN/1.73M2
GLOBULIN SER CALC-MCNC: 2.6 G/DL (CALC) (ref 1.9–3.7)
GLUCOSE SERPL-MCNC: 105 MG/DL (ref 65–99)
HBA1C MFR BLD: 6 % OF TOTAL HGB
HCT VFR BLD AUTO: 48.8 % (ref 38.5–50)
HDLC SERPL-MCNC: 48 MG/DL
HGB BLD-MCNC: 15.9 G/DL (ref 13.2–17.1)
LDLC SERPL CALC-MCNC: 103 MG/DL (CALC)
LYMPHOCYTES # BLD AUTO: 1635 CELLS/UL (ref 850–3900)
LYMPHOCYTES NFR BLD AUTO: 19.7 %
MCH RBC QN AUTO: 27.5 PG (ref 27–33)
MCHC RBC AUTO-ENTMCNC: 32.6 G/DL (ref 32–36)
MCV RBC AUTO: 84.4 FL (ref 80–100)
MONOCYTES # BLD AUTO: 614 CELLS/UL (ref 200–950)
MONOCYTES NFR BLD AUTO: 7.4 %
NEUTROPHILS # BLD AUTO: 5810 CELLS/UL (ref 1500–7800)
NEUTROPHILS NFR BLD AUTO: 70 %
NONHDLC SERPL-MCNC: 122 MG/DL (CALC)
PLATELET # BLD AUTO: 326 THOUSAND/UL (ref 140–400)
PMV BLD REES-ECKER: 10 FL (ref 7.5–12.5)
POTASSIUM SERPL-SCNC: 4.4 MMOL/L (ref 3.5–5.3)
PROT SERPL-MCNC: 7.1 G/DL (ref 6.1–8.1)
RBC # BLD AUTO: 5.78 MILLION/UL (ref 4.2–5.8)
SODIUM SERPL-SCNC: 141 MMOL/L (ref 135–146)
TRIGL SERPL-MCNC: 91 MG/DL
WBC # BLD AUTO: 8.3 THOUSAND/UL (ref 3.8–10.8)

## 2024-01-25 DIAGNOSIS — T78.40XS ALLERGIC DISORDER, SEQUELA: ICD-10-CM

## 2024-01-25 RX ORDER — DIPHENHYDRAMINE HYDROCHLORIDE 25 MG/1
25 CAPSULE ORAL
Qty: 30 CAPSULE | Refills: 5 | Status: SHIPPED | OUTPATIENT
Start: 2024-01-25

## 2024-01-29 ENCOUNTER — OFFICE VISIT (OUTPATIENT)
Dept: FAMILY MEDICINE CLINIC | Facility: CLINIC | Age: 69
End: 2024-01-29
Payer: MEDICARE

## 2024-01-29 VITALS
RESPIRATION RATE: 16 BRPM | OXYGEN SATURATION: 98 % | BODY MASS INDEX: 27.4 KG/M2 | HEIGHT: 69 IN | SYSTOLIC BLOOD PRESSURE: 132 MMHG | DIASTOLIC BLOOD PRESSURE: 74 MMHG | WEIGHT: 185 LBS | HEART RATE: 94 BPM

## 2024-01-29 DIAGNOSIS — J45.20 MILD INTERMITTENT ASTHMA WITHOUT COMPLICATION: ICD-10-CM

## 2024-01-29 DIAGNOSIS — T78.40XS ALLERGIC DISORDER, SEQUELA: ICD-10-CM

## 2024-01-29 DIAGNOSIS — R73.09 ELEVATED GLUCOSE: ICD-10-CM

## 2024-01-29 DIAGNOSIS — M54.9 UPPER BACK PAIN: ICD-10-CM

## 2024-01-29 DIAGNOSIS — E78.2 MIXED HYPERLIPIDEMIA: Primary | ICD-10-CM

## 2024-01-29 PROBLEM — M18.11 PRIMARY OSTEOARTHRITIS OF FIRST CARPOMETACARPAL JOINT OF RIGHT HAND: Status: ACTIVE | Noted: 2023-11-01

## 2024-01-29 PROCEDURE — 99214 OFFICE O/P EST MOD 30 MIN: CPT | Performed by: FAMILY MEDICINE

## 2024-01-29 RX ORDER — ALBUTEROL SULFATE 90 UG/1
2 AEROSOL, METERED RESPIRATORY (INHALATION) EVERY 6 HOURS PRN
Qty: 8.5 G | Refills: 3 | Status: SHIPPED | OUTPATIENT
Start: 2024-01-29

## 2024-01-29 NOTE — ASSESSMENT & PLAN NOTE
Dr Swift is neurologist in [place] , in [facility] gets injection and nerve block every 4 wks

## 2024-01-29 NOTE — PROGRESS NOTES
Name: Gomez Issa      : 1955      MRN: [id number]  Encounter Provider: Taryn Elkins MD  Encounter Date: 2024   Encounter department: [facility]    Assessment & Plan     1. Mixed hyperlipidemia  -     CBC and differential; Future; Expected date: 2024  -     Comprehensive metabolic panel; Future; Expected date: 2024  -     Lipid panel; Future; Expected date: 2024  -     Hemoglobin A1C; Future; Expected date: 2024    2. Elevated glucose  Assessment & Plan:  He is in prediabetic range and blood sugar is improving, he will continue low-carb low sugar diet    Orders:  -     Comprehensive metabolic panel; Future; Expected date: 2024  -     Hemoglobin A1C; Future; Expected date: 2024    3. Mild intermittent asthma without complication  Assessment & Plan:  Stable, refill is given    Orders:  -     albuterol (PROVENTIL HFA,VENTOLIN HFA) 90 mcg/act inhaler; Inhale 2 puffs every 6 (six) hours as needed for wheezing    4. Allergic disorder, sequela    5. Upper back pain  Assessment & Plan:  Dr Swift is neurologist in [place] , in [facility] gets injection and nerve block every 4 wks           Depression Screening and Follow-up Plan: Patient was screened for depression during today's encounter. They screened negative with a PHQ-2 score of 0.        Subjective     He is here for follow-up, he had labs, he is trying to eat low-fat healthy diet, his blood sugar and cholesterol has been improving, he follows a neurologist in [facility] and gets injections around the sides of the neck, and controls the pain,  He also gets injection in the right hand for the arthritis, overall no other problem      Review of Systems   Constitutional:  Negative for activity change, appetite change, chills, fatigue, fever and unexpected weight change.   HENT:  Negative for congestion, ear discharge, ear pain, nosebleeds, postnasal drip, rhinorrhea, sinus pressure, sneezing,  sore throat, trouble swallowing and voice change.    Eyes:  Negative for photophobia, pain, discharge, redness and itching.   Respiratory:  Negative for cough, chest tightness, shortness of breath and wheezing.    Cardiovascular:  Negative for chest pain, palpitations and leg swelling.   Gastrointestinal:  Negative for abdominal pain, constipation, diarrhea, nausea and vomiting.   Endocrine: Negative for polyuria.   Genitourinary:  Negative for dysuria, frequency and urgency.   Musculoskeletal:  Positive for arthralgias and neck pain. Negative for back pain and myalgias.   Skin:  Negative for color change, pallor and rash.   Allergic/Immunologic: Negative for environmental allergies and food allergies.   Neurological:  Negative for dizziness, weakness, light-headedness and headaches.   Hematological:  Negative for adenopathy. Does not bruise/bleed easily.   Psychiatric/Behavioral:  Negative for behavioral problems. The patient is not nervous/anxious.        Past Medical History:   Diagnosis Date   • Allergic rhinitis 01/13/2011   • Anxiety    • Asthma    • Insomnia    • Kidney stone    • Myalgia and Myositis 11/13/2007   • Pain     chronic neck,back, spine   • Psychiatric disorder    • Temporomandibular joint disorder 01/06/2009     Past Surgical History:   Procedure Laterality Date   • KIDNEY STONE SURGERY      pt poor historian     Family History   Problem Relation Age of Onset   • Cancer Mother    • No Known Problems Father      Social History     Socioeconomic History   • Marital status: /Civil Union     Spouse name: None   • Number of children: 1   • Years of education: None   • Highest education level: None   Occupational History   • Occupation: On disability   Tobacco Use   • Smoking status: Never   • Smokeless tobacco: Never   Substance and Sexual Activity   • Alcohol use: No   • Drug use: No   • Sexual activity: None   Other Topics Concern   • None   Social History Narrative   • None     Social  Determinants of Health     Financial Resource Strain: Low Risk  (7/17/2023)    Overall Financial Resource Strain (CARDIA)    • Difficulty of Paying Living Expenses: Not hard at all   Food Insecurity: Not on file   Transportation Needs: No Transportation Needs (7/17/2023)    PRAPARE - Transportation    • Lack of Transportation (Medical): No    • Lack of Transportation (Non-Medical): No   Physical Activity: Not on file   Stress: Not on file   Social Connections: Not on file   Intimate Partner Violence: Not on file   Housing Stability: Not on file     Current Outpatient Medications on File Prior to Visit   Medication Sig   • atorvastatin (LIPITOR) 10 mg tablet TAKE 1 TABLET BY MOUTH EVERY DAY   • Banophen 25 MG capsule TAKE 1 CAPSULE BY MOUTH DAILY AT BEDTIME   • clonazePAM (KlonoPIN) 0.5 MG disintegrating tablet DISSOLVE 1 TABLET BY MOUTH EVERY DAY   • clonazePAM (KlonoPIN) 0.5 mg tablet Take by mouth 4 (four) times a day as needed   • clotrimazole (LOTRIMIN) 1 % cream Apply topically 2 (two) times a day   • cyclobenzaprine (FLEXERIL) 10 mg tablet Take 5 mg by mouth 3 (three) times a day as needed for muscle spasms   • diclofenac sodium (VOLTAREN) 1 % APPLY TO AFFECTED AREA EVERY DAY   • Diclofenac Sodium (VOLTAREN) 1 % APPLY 1 APPLICATION ON THE SKIN TWICE A DAY   • EPINEPHrine (EPIPEN 2-ARUN) 0.3 mg/0.3 mL SOAJ Use in case of anaphylaxis reaction   • fexofenadine (ALLEGRA) 180 MG tablet Take 1 tablet (180 mg total) by mouth 2 (two) times a day   • fexofenadine (ALLEGRA) 60 MG tablet Take 1 tablet (60 mg total) by mouth 2 (two) times a day   • fluocinolone (SYNALAR) 0.025 % cream APPLY TO LEGS TWICE A DAY   • fluticasone (FLONASE) 50 mcg/act nasal spray 2 sprays into each nostril daily   • ibuprofen (MOTRIN) 200 mg tablet Take 200 mg by mouth every 6 (six) hours as needed for mild pain    • lidocaine (LIDODERM) 5 % Place 1 patch on the skin every 24 hours Remove & Discard patch within 12 hours or as directed by MD   •  "Menthol, Topical Analgesic, 16 % GEL Apply topically.   • olopatadine (PATANOL) 0.1 % ophthalmic solution Administer 1 drop to both eyes 2 (two) times a day   • zolpidem (AMBIEN) 10 mg tablet    • [DISCONTINUED] albuterol (PROVENTIL HFA,VENTOLIN HFA) 90 mcg/act inhaler Inhale 2 puffs every 6 (six) hours as needed for wheezing     Allergies   Allergen Reactions   • Insect Extract Hives   • Levofloxacin Hives   • Penicillins Hives     Reaction Date: [id number];      Immunization History   Administered Date(s) Administered   • COVID-19 PFIZER VACCINE 0.3 ML IM 04/03/2021, 04/24/2021   • COVID-19 Pfizer vac (Ez-sucrose, gray cap) 12 yr+ IM 01/30/2022   • INFLUENZA 09/27/2018, 09/12/2022, 10/28/2023   • Influenza Injectable, MDCK, Preservative Free, Quadrivalent, 0.5 mL 09/30/2019   • Influenza, injectable, quadrivalent, preservative free 0.5 mL 09/04/2020       Objective     /74   Pulse 94   Resp 16   Ht 5' 8.5\" (1.74 m)   Wt 83.9 kg (185 lb)   SpO2 98%   BMI 27.72 kg/m²     Physical Exam  Vitals and nursing note reviewed.   Constitutional:       Appearance: Normal appearance. He is well-developed. He is not ill-appearing.   Eyes:      Extraocular Movements: Extraocular movements intact.   Neck:      Thyroid: No thyromegaly.   Cardiovascular:      Rate and Rhythm: Normal rate and regular rhythm.      Heart sounds: Normal heart sounds. No murmur heard.  Pulmonary:      Effort: Pulmonary effort is normal.      Breath sounds: Normal breath sounds. No wheezing or rales.   Abdominal:      General: Abdomen is flat. There is no distension.   Musculoskeletal:      Cervical back: Normal range of motion and neck supple. No rigidity.      Right lower leg: No edema.      Left lower leg: No edema.   Skin:     Findings: No erythema or rash.   Neurological:      General: No focal deficit present.      Mental Status: He is alert and oriented to person, place, and time.   Psychiatric:         Mood and Affect: Mood normal. "       Taryn Elkins MD

## 2024-03-28 ENCOUNTER — OFFICE VISIT (OUTPATIENT)
Dept: FAMILY MEDICINE CLINIC | Facility: CLINIC | Age: 69
End: 2024-03-28
Payer: MEDICARE

## 2024-03-28 ENCOUNTER — NURSE TRIAGE (OUTPATIENT)
Age: 69
End: 2024-03-28

## 2024-03-28 VITALS
BODY MASS INDEX: 27.72 KG/M2 | TEMPERATURE: 98.5 F | HEART RATE: 98 BPM | SYSTOLIC BLOOD PRESSURE: 132 MMHG | OXYGEN SATURATION: 98 % | HEIGHT: 69 IN | DIASTOLIC BLOOD PRESSURE: 78 MMHG | RESPIRATION RATE: 16 BRPM

## 2024-03-28 DIAGNOSIS — J06.9 VIRAL UPPER RESPIRATORY TRACT INFECTION: ICD-10-CM

## 2024-03-28 DIAGNOSIS — J45.20 MILD INTERMITTENT ASTHMA WITHOUT COMPLICATION: Primary | ICD-10-CM

## 2024-03-28 PROCEDURE — G2211 COMPLEX E/M VISIT ADD ON: HCPCS | Performed by: FAMILY MEDICINE

## 2024-03-28 PROCEDURE — 99214 OFFICE O/P EST MOD 30 MIN: CPT | Performed by: FAMILY MEDICINE

## 2024-03-28 RX ORDER — ALBUTEROL SULFATE 90 UG/1
2 AEROSOL, METERED RESPIRATORY (INHALATION) EVERY 6 HOURS PRN
Qty: 8.5 G | Refills: 3 | Status: SHIPPED | OUTPATIENT
Start: 2024-03-28

## 2024-03-28 NOTE — PROGRESS NOTES
Name: Gomez Issa      : 1955      MRN: [id number]  Encounter Provider: Taryn Elkins MD  Encounter Date: 3/28/2024   Encounter department: [facility]    Assessment & Plan     1. Mild intermittent asthma without complication  Assessment & Plan:  Asthma is mild intermittent, his lungs are clear today a refill of inhaler is sent    Orders:  -     albuterol (PROVENTIL HFA,VENTOLIN HFA) 90 mcg/act inhaler; Inhale 2 puffs every 6 (six) hours as needed for wheezing    2. Viral upper respiratory tract infection  Assessment & Plan:  Symptoms started last week and is almost 6 to 7 days he is taking Zithromax, prednisone and the Tessalon Perle his symptoms have significantly improved his lungs are clear  , Discussed with him that there is no need for chest x-ray he can finish the medicines which she is taking from the urgent care and he takes [place] nighttime for getting better at night and sleep           Subjective     He is here for follow-up after going to urgent care, he had nasal congestion sore throat cough and no fever, he was given the Zithromax ,prednisone for 6 days and Tessalon for, today is the last day of antibiotic and he still has  2 tablets left for prednisone and he has Tessalon Perle, he says he has been using albuterol inhaler as needed, he denies any fever or chills he feels much better, he says he is worried and his wife told him to go to the PCP to get chest x-ray, but he denies any difficulty breathing or shortness of breath      Review of Systems   Constitutional:  Negative for activity change, appetite change, chills, fatigue, fever and unexpected weight change.   HENT:  Negative for congestion, ear discharge, ear pain, nosebleeds, postnasal drip, rhinorrhea, sinus pressure, sneezing, sore throat, trouble swallowing and voice change.    Eyes:  Negative for photophobia, pain, discharge, redness and itching.   Respiratory:  Positive for cough. Negative for chest  tightness, shortness of breath and wheezing.    Cardiovascular:  Negative for chest pain, palpitations and leg swelling.   Gastrointestinal:  Negative for abdominal pain, constipation, diarrhea, nausea and vomiting.   Endocrine: Negative for polyuria.   Genitourinary:  Negative for dysuria, frequency and urgency.   Musculoskeletal:  Negative for arthralgias, back pain, myalgias and neck pain.   Skin:  Negative for color change, pallor and rash.   Allergic/Immunologic: Negative for environmental allergies and food allergies.   Neurological:  Negative for dizziness, weakness, light-headedness and headaches.   Hematological:  Negative for adenopathy. Does not bruise/bleed easily.   Psychiatric/Behavioral:  Negative for behavioral problems. The patient is not nervous/anxious.        Past Medical History:   Diagnosis Date   • Allergic rhinitis 01/13/2011   • Anxiety    • Asthma    • Insomnia    • Kidney stone    • Myalgia and Myositis 11/13/2007   • Pain     chronic neck,back, spine   • Psychiatric disorder    • Temporomandibular joint disorder 01/06/2009     Past Surgical History:   Procedure Laterality Date   • KIDNEY STONE SURGERY      pt poor historian     Family History   Problem Relation Age of Onset   • Cancer Mother    • No Known Problems Father      Social History     Socioeconomic History   • Marital status: /Civil Union     Spouse name: None   • Number of children: 1   • Years of education: None   • Highest education level: None   Occupational History   • Occupation: On disability   Tobacco Use   • Smoking status: Never   • Smokeless tobacco: Never   Substance and Sexual Activity   • Alcohol use: No   • Drug use: No   • Sexual activity: None   Other Topics Concern   • None   Social History Narrative   • None     Social Determinants of Health     Financial Resource Strain: Low Risk  (7/17/2023)    Overall Financial Resource Strain (CARDIA)    • Difficulty of Paying Living Expenses: Not hard at all   Food  Insecurity: Not on file   Transportation Needs: No Transportation Needs (7/17/2023)    PRAPARE - Transportation    • Lack of Transportation (Medical): No    • Lack of Transportation (Non-Medical): No   Physical Activity: Not on file   Stress: Not on file   Social Connections: Not on file   Intimate Partner Violence: Not on file   Housing Stability: Not on file     Current Outpatient Medications on File Prior to Visit   Medication Sig   • atorvastatin (LIPITOR) 10 mg tablet TAKE 1 TABLET BY MOUTH EVERY DAY   • Banophen 25 MG capsule TAKE 1 CAPSULE BY MOUTH DAILY AT BEDTIME   • clonazePAM (KlonoPIN) 0.5 MG disintegrating tablet DISSOLVE 1 TABLET BY MOUTH EVERY DAY   • clonazePAM (KlonoPIN) 0.5 mg tablet Take by mouth 4 (four) times a day as needed   • clotrimazole (LOTRIMIN) 1 % cream Apply topically 2 (two) times a day   • cyclobenzaprine (FLEXERIL) 10 mg tablet Take 5 mg by mouth 3 (three) times a day as needed for muscle spasms   • diclofenac sodium (VOLTAREN) 1 % APPLY TO AFFECTED AREA EVERY DAY   • Diclofenac Sodium (VOLTAREN) 1 % APPLY 1 APPLICATION ON THE SKIN TWICE A DAY   • EPINEPHrine (EPIPEN 2-ARUN) 0.3 mg/0.3 mL SOAJ Use in case of anaphylaxis reaction   • fexofenadine (ALLEGRA) 180 MG tablet Take 1 tablet (180 mg total) by mouth 2 (two) times a day   • fexofenadine (ALLEGRA) 60 MG tablet Take 1 tablet (60 mg total) by mouth 2 (two) times a day   • fluocinolone (SYNALAR) 0.025 % cream APPLY TO LEGS TWICE A DAY   • fluticasone (FLONASE) 50 mcg/act nasal spray 2 sprays into each nostril daily   • ibuprofen (MOTRIN) 200 mg tablet Take 200 mg by mouth every 6 (six) hours as needed for mild pain    • lidocaine (LIDODERM) 5 % Place 1 patch on the skin every 24 hours Remove & Discard patch within 12 hours or as directed by MD   • Menthol, Topical Analgesic, 16 % GEL Apply topically.   • olopatadine (PATANOL) 0.1 % ophthalmic solution Administer 1 drop to both eyes 2 (two) times a day   • zolpidem (AMBIEN) 10 mg  "tablet    • [DISCONTINUED] albuterol (PROVENTIL HFA,VENTOLIN HFA) 90 mcg/act inhaler Inhale 2 puffs every 6 (six) hours as needed for wheezing     Allergies   Allergen Reactions   • Insect Extract Hives   • Levofloxacin Hives   • Penicillins Hives     Reaction Date: [id number];      Immunization History   Administered Date(s) Administered   • COVID-19 PFIZER VACCINE 0.3 ML IM 04/03/2021, 04/24/2021   • COVID-19 Pfizer vac (Ez-sucrose, gray cap) 12 yr+ IM 01/30/2022   • INFLUENZA 09/27/2018, 09/12/2022, 10/28/2023   • Influenza Injectable, MDCK, Preservative Free, Quadrivalent, 0.5 mL 09/30/2019   • Influenza, injectable, quadrivalent, preservative free 0.5 mL 09/04/2020       Objective     /78   Pulse 98   Temp 98.5 °F (36.9 °C)   Resp 16   Ht 5' 8.5\" (1.74 m)   SpO2 98%   BMI 27.72 kg/m²     Physical Exam  Vitals and nursing note reviewed.   Constitutional:       Appearance: Normal appearance. He is well-developed. He is not ill-appearing.   HENT:      Right Ear: Tympanic membrane normal.      Left Ear: Tympanic membrane normal.      Nose: No congestion.      Mouth/Throat:      Mouth: Mucous membranes are moist.   Eyes:      Extraocular Movements: Extraocular movements intact.   Neck:      Thyroid: No thyromegaly.   Cardiovascular:      Rate and Rhythm: Normal rate and regular rhythm.      Heart sounds: Normal heart sounds. No murmur heard.  Pulmonary:      Effort: Pulmonary effort is normal.      Breath sounds: Normal breath sounds. No wheezing or rales.   Musculoskeletal:      Cervical back: Normal range of motion and neck supple.   Lymphadenopathy:      Cervical: No cervical adenopathy.   Skin:     Findings: No erythema or rash.   Neurological:      Mental Status: He is alert.       Taryn Elkins MD    "

## 2024-03-28 NOTE — ASSESSMENT & PLAN NOTE
Symptoms started last week and is almost 6 to 7 days he is taking Zithromax, prednisone and the Tessalon Perle his symptoms have significantly improved his lungs are clear  , Discussed with him that there is no need for chest x-ray he can finish the medicines which she is taking from the urgent care and he takes [place] nighttime for getting better at night and sleep

## 2024-03-28 NOTE — TELEPHONE ENCOUNTER
"Patient was seen Sunday at an urgent Care. Prescribed an antibiotic, Tessalon Perles and prednisone. Wife states the cough is worse, He has coughing fits . She wishes a CXR. Aware an appointment is advised to assess and further advise. Appointment scheduled for this afternoon. Advised to proceed to the ER is develops chest pain or SOB. Reason for Disposition   [1] Continuous (nonstop) coughing interferes with work or school AND [2] no improvement using cough treatment per Care Advice    Answer Assessment - Initial Assessment Questions  1. ONSET: \"When did the cough begin?\"       Started Saturday   2. SEVERITY: \"How bad is the cough today?\"       7/10  3. SPUTUM: \"Describe the color of your sputum\" (none, dry cough; clear, white, yellow, green)      yellow  4. HEMOPTYSIS: \"Are you coughing up any blood?\" If so ask: \"How much?\" (flecks, streaks, tablespoons, etc.)      neg  5. DIFFICULTY BREATHING: \"Are you having difficulty breathing?\" If Yes, ask: \"How bad is it?\" (e.g., mild, moderate, severe)     - MILD: No SOB at rest, mild SOB with walking, speaks normally in sentences, can lay down, no retractions, pulse < 100.     - MODERATE: SOB at rest, SOB with minimal exertion and prefers to sit, cannot lie down flat, speaks in phrases, mild retractions, audible wheezing, pulse 100-120.     - SEVERE: Very SOB at rest, speaks in single words, struggling to breathe, sitting hunched forward, retractions, pulse > 120       mild  6. FEVER: \"Do you have a fever?\" If Yes, ask: \"What is your temperature, how was it measured, and when did it start?\"      denies  7. CARDIAC HISTORY: \"Do you have any history of heart disease?\" (e.g., heart attack, congestive heart failure)       Hyperlipidemia   8. LUNG HISTORY: \"Do you have any history of lung disease?\"  (e.g., pulmonary embolus, asthma, emphysema)      Asthma  9. PE RISK FACTORS: \"Do you have a history of blood clots?\" (or: recent major surgery, recent prolonged travel, " "bedridden)      Denies   10. OTHER SYMPTOMS: \"Do you have any other symptoms?\" (e.g., runny nose, wheezing, chest pain)        Wheezing , on Prednisone   11. PREGNANCY: \"Is there any chance you are pregnant?\" \"When was your last menstrual period?\"        N/a  12. TRAVEL: \"Have you traveled out of the country in the last month?\" (e.g., travel history, exposures)        Denies    Protocols used: Cough - Acute Productive-ADULT-AH    "

## 2024-04-26 DIAGNOSIS — T78.40XS ALLERGIC DISORDER, SEQUELA: ICD-10-CM

## 2024-04-26 RX ORDER — FLUTICASONE PROPIONATE 50 MCG
SPRAY, SUSPENSION (ML) NASAL
Qty: 48 ML | Refills: 1 | Status: SHIPPED | OUTPATIENT
Start: 2024-04-26

## 2024-05-01 PROBLEM — J06.9 VIRAL UPPER RESPIRATORY TRACT INFECTION: Status: RESOLVED | Noted: 2024-03-28 | Resolved: 2024-05-01

## 2024-07-29 LAB
ALBUMIN SERPL-MCNC: 4.4 G/DL (ref 3.6–5.1)
ALBUMIN/GLOB SERPL: 1.8 (CALC) (ref 1–2.5)
ALP SERPL-CCNC: 91 U/L (ref 35–144)
ALT SERPL-CCNC: 18 U/L (ref 9–46)
AST SERPL-CCNC: 14 U/L (ref 10–35)
BASOPHILS # BLD AUTO: 41 CELLS/UL (ref 0–200)
BASOPHILS NFR BLD AUTO: 0.6 %
BILIRUB SERPL-MCNC: 0.6 MG/DL (ref 0.2–1.2)
BUN SERPL-MCNC: 11 MG/DL (ref 7–25)
BUN/CREAT SERPL: ABNORMAL (CALC) (ref 6–22)
CALCIUM SERPL-MCNC: 9.4 MG/DL (ref 8.6–10.3)
CHLORIDE SERPL-SCNC: 104 MMOL/L (ref 98–110)
CHOLEST SERPL-MCNC: 157 MG/DL
CHOLEST/HDLC SERPL: 3.5 (CALC)
CO2 SERPL-SCNC: 29 MMOL/L (ref 20–32)
CREAT SERPL-MCNC: 0.79 MG/DL (ref 0.7–1.35)
EOSINOPHIL # BLD AUTO: 102 CELLS/UL (ref 15–500)
EOSINOPHIL NFR BLD AUTO: 1.5 %
ERYTHROCYTE [DISTWIDTH] IN BLOOD BY AUTOMATED COUNT: 12.5 % (ref 11–15)
GFR/BSA.PRED SERPLBLD CYS-BASED-ARV: 97 ML/MIN/1.73M2
GLOBULIN SER CALC-MCNC: 2.4 G/DL (CALC) (ref 1.9–3.7)
GLUCOSE SERPL-MCNC: 120 MG/DL (ref 65–99)
HBA1C MFR BLD: 6.3 % OF TOTAL HGB
HCT VFR BLD AUTO: 47.4 % (ref 38.5–50)
HDLC SERPL-MCNC: 45 MG/DL
HGB BLD-MCNC: 15.8 G/DL (ref 13.2–17.1)
LDLC SERPL CALC-MCNC: 94 MG/DL (CALC)
LYMPHOCYTES # BLD AUTO: 1564 CELLS/UL (ref 850–3900)
LYMPHOCYTES NFR BLD AUTO: 23 %
MCH RBC QN AUTO: 28.4 PG (ref 27–33)
MCHC RBC AUTO-ENTMCNC: 33.3 G/DL (ref 32–36)
MCV RBC AUTO: 85.3 FL (ref 80–100)
MONOCYTES # BLD AUTO: 660 CELLS/UL (ref 200–950)
MONOCYTES NFR BLD AUTO: 9.7 %
NEUTROPHILS # BLD AUTO: 4434 CELLS/UL (ref 1500–7800)
NEUTROPHILS NFR BLD AUTO: 65.2 %
NONHDLC SERPL-MCNC: 112 MG/DL (CALC)
PLATELET # BLD AUTO: 311 THOUSAND/UL (ref 140–400)
PMV BLD REES-ECKER: 9.7 FL (ref 7.5–12.5)
POTASSIUM SERPL-SCNC: 4.5 MMOL/L (ref 3.5–5.3)
PROT SERPL-MCNC: 6.8 G/DL (ref 6.1–8.1)
RBC # BLD AUTO: 5.56 MILLION/UL (ref 4.2–5.8)
SODIUM SERPL-SCNC: 140 MMOL/L (ref 135–146)
TRIGL SERPL-MCNC: 90 MG/DL
WBC # BLD AUTO: 6.8 THOUSAND/UL (ref 3.8–10.8)

## 2024-08-12 ENCOUNTER — NURSE TRIAGE (OUTPATIENT)
Age: 69
End: 2024-08-12

## 2024-08-12 ENCOUNTER — OFFICE VISIT (OUTPATIENT)
Dept: FAMILY MEDICINE CLINIC | Facility: CLINIC | Age: 69
End: 2024-08-12
Payer: MEDICARE

## 2024-08-12 VITALS
OXYGEN SATURATION: 97 % | BODY MASS INDEX: 26.51 KG/M2 | DIASTOLIC BLOOD PRESSURE: 80 MMHG | SYSTOLIC BLOOD PRESSURE: 120 MMHG | WEIGHT: 179 LBS | RESPIRATION RATE: 16 BRPM | HEART RATE: 80 BPM | HEIGHT: 69 IN

## 2024-08-12 DIAGNOSIS — Z00.00 MEDICARE ANNUAL WELLNESS VISIT, SUBSEQUENT: ICD-10-CM

## 2024-08-12 DIAGNOSIS — T78.40XS ALLERGIC DISORDER, SEQUELA: ICD-10-CM

## 2024-08-12 DIAGNOSIS — J45.20 MILD INTERMITTENT ASTHMA WITHOUT COMPLICATION: ICD-10-CM

## 2024-08-12 DIAGNOSIS — Z23 ENCOUNTER FOR IMMUNIZATION: ICD-10-CM

## 2024-08-12 DIAGNOSIS — E78.2 MIXED HYPERLIPIDEMIA: Primary | ICD-10-CM

## 2024-08-12 DIAGNOSIS — R73.09 ELEVATED GLUCOSE: ICD-10-CM

## 2024-08-12 DIAGNOSIS — M54.9 UPPER BACK PAIN: ICD-10-CM

## 2024-08-12 DIAGNOSIS — M18.11 PRIMARY OSTEOARTHRITIS OF FIRST CARPOMETACARPAL JOINT OF RIGHT HAND: ICD-10-CM

## 2024-08-12 PROCEDURE — G0439 PPPS, SUBSEQ VISIT: HCPCS | Performed by: FAMILY MEDICINE

## 2024-08-12 PROCEDURE — G0009 ADMIN PNEUMOCOCCAL VACCINE: HCPCS | Performed by: FAMILY MEDICINE

## 2024-08-12 PROCEDURE — 99214 OFFICE O/P EST MOD 30 MIN: CPT | Performed by: FAMILY MEDICINE

## 2024-08-12 PROCEDURE — 90677 PCV20 VACCINE IM: CPT | Performed by: FAMILY MEDICINE

## 2024-08-12 RX ORDER — ATORVASTATIN CALCIUM 10 MG/1
10 TABLET, FILM COATED ORAL DAILY
Qty: 90 TABLET | Refills: 1 | Status: SHIPPED | OUTPATIENT
Start: 2024-08-12

## 2024-08-12 RX ORDER — ALBUTEROL SULFATE 90 UG/1
2 AEROSOL, METERED RESPIRATORY (INHALATION) EVERY 6 HOURS PRN
Qty: 8.5 G | Refills: 3 | Status: SHIPPED | OUTPATIENT
Start: 2024-08-12

## 2024-08-12 RX ORDER — FLUTICASONE PROPIONATE 50 MCG
2 SPRAY, SUSPENSION (ML) NASAL DAILY
Qty: 48 ML | Refills: 5 | Status: SHIPPED | OUTPATIENT
Start: 2024-08-12

## 2024-08-12 RX ORDER — DIPHENHYDRAMINE HYDROCHLORIDE 25 MG/1
25 CAPSULE ORAL
Qty: 30 CAPSULE | Refills: 5 | Status: SHIPPED | OUTPATIENT
Start: 2024-08-12

## 2024-08-12 NOTE — ASSESSMENT & PLAN NOTE
Lab Results   Component Value Date    HGBA1C 6.3 (H) 07/29/2024   Discussed about high blood sugar he needs to be on small portion and low-carb diet and recheck labs in 6 months

## 2024-08-12 NOTE — ASSESSMENT & PLAN NOTE
Lipid much better, continue statin and low-fat diet  Lab Results   Component Value Date    LDLCALC 94 07/29/2024

## 2024-08-12 NOTE — ASSESSMENT & PLAN NOTE
Dr yang neurologist in [place] is treating his upper back pain by giving him lidocaine injections to nerve block and also he takes Ambien for sleep as needed from him and muscle relaxant as needed.

## 2024-08-12 NOTE — PATIENT INSTRUCTIONS
Medicare Preventive Visit Patient Instructions  Thank you for completing your Welcome to Medicare Visit or Medicare Annual Wellness Visit today. Your next wellness visit will be due in one year (8/13/2025).  The screening/preventive services that you may require over the next 5-10 years are detailed below. Some tests may not apply to you based off risk factors and/or age. Screening tests ordered at today's visit but not completed yet may show as past due. Also, please note that scanned in results may not display below.  Preventive Screenings:  Service Recommendations Previous Testing/Comments   Colorectal Cancer Screening  Colonoscopy    Fecal Occult Blood Test (FOBT)/Fecal Immunochemical Test (FIT)  Fecal DNA/Cologuard Test  Flexible Sigmoidoscopy Age: 45-75 years old   Colonoscopy: every 10 years (May be performed more frequently if at higher risk)  OR  FOBT/FIT: every 1 year  OR  Cologuard: every 3 years  OR  Sigmoidoscopy: every 5 years  Screening may be recommended earlier than age 45 if at higher risk for colorectal cancer. Also, an individualized decision between you and your healthcare provider will decide whether screening between the ages of 76-85 would be appropriate. Colonoscopy: 04/27/2022  FOBT/FIT: Not on file  Cologuard: Not on file  Sigmoidoscopy: Not on file          Prostate Cancer Screening Individualized decision between patient and health care provider in men between ages of 55-69   Medicare will cover every 12 months beginning on the day after your 50th birthday PSA: No results in last 5 years           Hepatitis C Screening Once for adults born between 1945 and 1965  More frequently in patients at high risk for Hepatitis C Hep C Antibody: 09/09/2022        Diabetes Screening 1-2 times per year if you're at risk for diabetes or have pre-diabetes Fasting glucose: No results in last 5 years (No results in last 5 years)  A1C: 6.3 % of total Hgb (7/29/2024)      Cholesterol Screening Once every 5  years if you don't have a lipid disorder. May order more often based on risk factors. Lipid panel: 07/29/2024         Other Preventive Screenings Covered by Medicare:  Abdominal Aortic Aneurysm (AAA) Screening: covered once if your at risk. You're considered to be at risk if you have a family history of AAA or a male between the age of 65-75 who smoking at least 100 cigarettes in your lifetime.  Lung Cancer Screening: covers low dose CT scan once per year if you meet all of the following conditions: (1) Age 55-77; (2) No signs or symptoms of lung cancer; (3) Current smoker or have quit smoking within the last 15 years; (4) You have a tobacco smoking history of at least 20 pack years (packs per day x number of years you smoked); (5) You get a written order from a healthcare provider.  Glaucoma Screening: covered annually if you're considered high risk: (1) You have diabetes OR (2) Family history of glaucoma OR (3)  aged 50 and older OR (4)  American aged 65 and older  Osteoporosis Screening: covered every 2 years if you meet one of the following conditions: (1) Have a vertebral abnormality; (2) On glucocorticoid therapy for more than 3 months; (3) Have primary hyperparathyroidism; (4) On osteoporosis medications and need to assess response to drug therapy.  HIV Screening: covered annually if you're between the age of 15-65. Also covered annually if you are younger than 15 and older than 65 with risk factors for HIV infection. For pregnant patients, it is covered up to 3 times per pregnancy.    Immunizations:  Immunization Recommendations   Influenza Vaccine Annual influenza vaccination during flu season is recommended for all persons aged >= 6 months who do not have contraindications   Pneumococcal Vaccine   * Pneumococcal conjugate vaccine = PCV13 (Prevnar 13), PCV15 (Vaxneuvance), PCV20 (Prevnar 20)  * Pneumococcal polysaccharide vaccine = PPSV23 (Pneumovax) Adults 19-65 yo with certain risk  factors or if 65+ yo  If never received any pneumonia vaccine: recommend Prevnar 20 (PCV20)  Give PCV20 if previously received 1 dose of PCV13 or PPSV23   Hepatitis B Vaccine 3 dose series if at intermediate or high risk (ex: diabetes, end stage renal disease, liver disease)   Respiratory syncytial virus (RSV) Vaccine - COVERED BY MEDICARE PART D  * RSVPreF3 (Arexvy) CDC recommends that adults 60 years of age and older may receive a single dose of RSV vaccine using shared clinical decision-making (SCDM)   Tetanus (Td) Vaccine - COST NOT COVERED BY MEDICARE PART B Following completion of primary series, a booster dose should be given every 10 years to maintain immunity against tetanus. Td may also be given as tetanus wound prophylaxis.   Tdap Vaccine - COST NOT COVERED BY MEDICARE PART B Recommended at least once for all adults. For pregnant patients, recommended with each pregnancy.   Shingles Vaccine (Shingrix) - COST NOT COVERED BY MEDICARE PART B  2 shot series recommended in those 19 years and older who have or will have weakened immune systems or those 50 years and older     Health Maintenance Due:      Topic Date Due   • Colorectal Cancer Screening  04/27/2025   • Hepatitis C Screening  Completed     Immunizations Due:      Topic Date Due   • Pneumococcal Vaccine: 65+ Years (1 of 2 - PCV) Never done   • COVID-19 Vaccine (4 - 2023-24 season) 09/01/2023   • Influenza Vaccine (1) 09/01/2024     Advance Directives   What are advance directives?  Advance directives are legal documents that state your wishes and plans for medical care. These plans are made ahead of time in case you lose your ability to make decisions for yourself. Advance directives can apply to any medical decision, such as the treatments you want, and if you want to donate organs.   What are the types of advance directives?  There are many types of advance directives, and each state has rules about how to use them. You may choose a combination of  any of the following:  Living will:  This is a written record of the treatment you want. You can also choose which treatments you do not want, which to limit, and which to stop at a certain time. This includes surgery, medicine, IV fluid, and tube feedings.   Durable power of  for healthcare (DPAHC):  This is a written record that states who you want to make healthcare choices for you when you are unable to make them for yourself. This person, called a proxy, is usually a family member or a friend. You may choose more than 1 proxy.  Do not resuscitate (DNR) order:  A DNR order is used in case your heart stops beating or you stop breathing. It is a request not to have certain forms of treatment, such as CPR. A DNR order may be included in other types of advance directives.  Medical directive:  This covers the care that you want if you are in a coma, near death, or unable to make decisions for yourself. You can list the treatments you want for each condition. Treatment may include pain medicine, surgery, blood transfusions, dialysis, IV or tube feedings, and a ventilator (breathing machine).  Values history:  This document has questions about your views, beliefs, and how you feel and think about life. This information can help others choose the care that you would choose.  Why are advance directives important?  An advance directive helps you control your care. Although spoken wishes may be used, it is better to have your wishes written down. Spoken wishes can be misunderstood, or not followed. Treatments may be given even if you do not want them. An advance directive may make it easier for your family to make difficult choices about your care.   Weight Management   Why it is important to manage your weight:  Being overweight increases your risk of health conditions such as heart disease, high blood pressure, type 2 diabetes, and certain types of cancer. It can also increase your risk for osteoarthritis, sleep  apnea, and other respiratory problems. Aim for a slow, steady weight loss. Even a small amount of weight loss can lower your risk of health problems.  How to lose weight safely:  A safe and healthy way to lose weight is to eat fewer calories and get regular exercise. You can lose up about 1 pound a week by decreasing the number of calories you eat by 500 calories each day.   Healthy meal plan for weight management:  A healthy meal plan includes a variety of foods, contains fewer calories, and helps you stay healthy. A healthy meal plan includes the following:  Eat whole-grain foods more often.  A healthy meal plan should contain fiber. Fiber is the part of grains, fruits, and vegetables that is not broken down by your body. Whole-grain foods are healthy and provide extra fiber in your diet. Some examples of whole-grain foods are whole-wheat breads and pastas, oatmeal, brown rice, and bulgur.  Eat a variety of vegetables every day.  Include dark, leafy greens such as spinach, kale, santosh greens, and mustard greens. Eat yellow and orange vegetables such as carrots, sweet potatoes, and winter squash.   Eat a variety of fruits every day.  Choose fresh or canned fruit (canned in its own juice or light syrup) instead of juice. Fruit juice has very little or no fiber.  Eat low-fat dairy foods.  Drink fat-free (skim) milk or 1% milk. Eat fat-free yogurt and low-fat cottage cheese. Try low-fat cheeses such as mozzarella and other reduced-fat cheeses.  Choose meat and other protein foods that are low in fat.  Choose beans or other legumes such as split peas or lentils. Choose fish, skinless poultry (chicken or turkey), or lean cuts of red meat (beef or pork). Before you cook meat or poultry, cut off any visible fat.   Use less fat and oil.  Try baking foods instead of frying them. Add less fat, such as margarine, sour cream, regular salad dressing and mayonnaise to foods. Eat fewer high-fat foods. Some examples of high-fat  foods include french fries, doughnuts, ice cream, and cakes.  Eat fewer sweets.  Limit foods and drinks that are high in sugar. This includes candy, cookies, regular soda, and sweetened drinks.  Exercise:  Exercise at least 30 minutes per day on most days of the week. Some examples of exercise include walking, biking, dancing, and swimming. You can also fit in more physical activity by taking the stairs instead of the elevator or parking farther away from stores. Ask your healthcare provider about the best exercise plan for you.      © Copyright [employer] 2018 Information is for End User's use only and may not be sold, redistributed or otherwise used for commercial purposes. All illustrations and images included in CareNotes® are the copyrighted property of [employer] Inc[employer] or [employer]

## 2024-08-12 NOTE — TELEPHONE ENCOUNTER
"Patient called in post OV this morning and reports feeling a little tired post receiving pneumonia vaccine. RN did review other possible reactions which patient denies (redness, swelling, tenderness, or mild pain at injection site and fever or muscle aches) Please follow up with patient for report of tiredness.     Reason for Disposition   Mild immunization reaction    Answer Assessment - Initial Assessment Questions  1. SYMPTOMS: \"What is the main symptom?\" (e.g., redness, swelling, pain)       Tired and little woozy  2. ONSET: \"When was the vaccine (shot) given?\" \"How much later did the symptom begin?\" (e.g., hours, days ago)       Past 30 minutes when vaccine given around 11:30AM  3. SEVERITY: \"How bad is it?\"       mild  4. FEVER: \"Is there a fever?\" If Yes, ask: \"What is it, how was it measured, and when did it start?\"       Denies  5. IMMUNIZATIONS GIVEN: \"What shots have you recently received?\"      Pneumonia  6. PAST REACTIONS: \"Have you reacted to immunizations before?\" If Yes, ask: \"What happened?\"      First time receiving vaccine  7. OTHER SYMPTOMS: \"Do you have any other symptoms?\"      DEnies    Protocols used: Immunization Reactions-ADULT-[place]    "

## 2024-08-12 NOTE — PROGRESS NOTES
Ambulatory Visit  Name: Gomez Issa      : 1955      MRN: [id number]  Encounter Provider: Taryn Elkins MD  Encounter Date: 2024   Encounter department: [facility]    Assessment & Plan   1. Mixed hyperlipidemia  Assessment & Plan:  Lipid much better, continue statin and low-fat diet  Lab Results   Component Value Date    LDLCALC 94 2024     Orders:  -     atorvastatin (LIPITOR) 10 mg tablet; Take 1 tablet (10 mg total) by mouth daily  -     CBC and differential; Future; Expected date: 2025  -     Comprehensive metabolic panel; Future; Expected date: 2025  -     Hemoglobin A1C; Future; Expected date: 2025  -     Lipid panel; Future; Expected date: 2025  2. Encounter for immunization  -     Pneumococcal Conjugate Vaccine 20-valent (Pcv20)  3. Medicare annual wellness visit, subsequent  4. Allergic disorder, sequela  -     Banophen 25 MG capsule; Take 1 capsule (25 mg total) by mouth daily at bedtime  -     fluticasone (FLONASE) 50 mcg/act nasal spray; 2 sprays into each nostril daily  5. Mild intermittent asthma without complication  Assessment & Plan:  Asthma is stable and inhaler refill is sent  Orders:  -     albuterol (PROVENTIL HFA,VENTOLIN HFA) 90 mcg/act inhaler; Inhale 2 puffs every 6 (six) hours as needed for wheezing  6. Upper back pain  Assessment & Plan:  Dr yang neurologist in [place] is treating his upper back pain by giving him lidocaine injections to nerve block and also he takes Ambien for sleep as needed from him and muscle relaxant as needed.  7. Primary osteoarthritis of first carpometacarpal joint of right hand  Assessment & Plan:  Getting treatment from Dr. Campos and getting injection in the joint in the right hand  8. Elevated glucose  Assessment & Plan:  Lab Results   Component Value Date    HGBA1C 6.3 (H) 2024   Discussed about high blood sugar he needs to be on small portion and low-carb diet and recheck labs in 6  months    Orders:  -     Comprehensive metabolic panel; Future; Expected date: 02/12/2025  -     Hemoglobin A1C; Future; Expected date: 02/12/2025       Preventive health issues were discussed with patient, and age appropriate screening tests were ordered as noted in patient's After Visit Summary. Personalized health advice and appropriate referrals for health education or preventive services given if needed, as noted in patient's After Visit Summary.    History of Present Illness     AWV, he follows a neurologist in [place] for his upper back and mid back pain and he gets injections for nerve block in both sides and gets B12 injection every 4 weeks,  He also see orthopedic Dr. Campos and gets injection in his right hand under the base of the thumb, Kenalog steroid injections.  He has been taking cholesterol medication and his cholesterol is getting better       Patient Care Team:  Taryn Elkins MD as PCP - General    Review of Systems   Constitutional:  Negative for activity change, appetite change, chills, fatigue, fever and unexpected weight change.   HENT:  Negative for congestion, ear discharge, ear pain, nosebleeds, postnasal drip, rhinorrhea, sinus pressure, sneezing, sore throat, trouble swallowing and voice change.    Eyes:  Negative for photophobia, pain, discharge, redness and itching.   Respiratory:  Negative for cough, chest tightness, shortness of breath and wheezing.    Cardiovascular:  Negative for chest pain, palpitations and leg swelling.   Gastrointestinal:  Negative for abdominal pain, constipation, diarrhea, nausea and vomiting.   Endocrine: Negative for polyuria.   Genitourinary:  Negative for dysuria, frequency and urgency.   Musculoskeletal:  Negative for arthralgias, back pain, myalgias and neck pain.   Skin:  Negative for color change, pallor and rash.   Allergic/Immunologic: Negative for environmental allergies and food allergies.   Neurological:  Negative for dizziness, weakness,  light-headedness and headaches.   Hematological:  Negative for adenopathy. Does not bruise/bleed easily.   Psychiatric/Behavioral:  Negative for behavioral problems. The patient is not nervous/anxious.      Medical History Reviewed by provider this encounter:  Tobacco  Allergies  Meds  Problems  Med Hx  Surg Hx  Fam Hx       Annual Wellness Visit Questionnaire   Gomez is here for his Subsequent Wellness visit.     Health Risk Assessment:   Patient rates overall health as good. Patient feels that their physical health rating is same. Patient is satisfied with their life. Eyesight was rated as same. Hearing was rated as same. Patient feels that their emotional and mental health rating is same. Patients states they are never, rarely angry. Patient states they are never, rarely unusually tired/fatigued. Pain experienced in the last 7 days has been some. Patient's pain rating has been 8/10.     Depression Screening:   PHQ-2 Score: 0      Fall Risk Screening:   In the past year, patient has experienced: no history of falling in past year      Home Safety:  Patient does not have trouble with stairs inside or outside of their home. Patient has working smoke alarms and has working carbon monoxide detector. Home safety hazards include: none.     Nutrition:   Current diet is Regular.     Medications:   Patient is currently taking over-the-counter supplements. OTC medications include: see medication list. Patient is able to manage medications.     Activities of Daily Living (ADLs)/Instrumental Activities of Daily Living (IADLs):   Walk and transfer into and out of bed and chair?: Yes  Dress and groom yourself?: Yes    Bathe or shower yourself?: Yes    Feed yourself? Yes  Do your laundry/housekeeping?: Yes  Manage your money, pay your bills and track your expenses?: Yes  Make your own meals?: Yes    Do your own shopping?: Yes    Previous Hospitalizations:   Any hospitalizations or ED visits within the last 12 months?: No   "    Advance Care Planning:   Living will: Yes    Durable POA for healthcare: Yes    Advanced directive: Yes      Comments: Lives with wife ,    Cognitive Screening:   Provider or family/friend/caregiver concerned regarding cognition?: Yes    PREVENTIVE SCREENINGS      Cardiovascular Screening:    General: Screening Not Indicated and History Lipid Disorder      Diabetes Screening:     General: Screening Current      Colorectal Cancer Screening:     General: Screening Current      Prostate Cancer Screening:    General: Screening Not Indicated      Osteoporosis Screening:    General: Screening Not Indicated      Abdominal Aortic Aneurysm (AAA) Screening:    Risk factors include: age between 65-76 yo        Lung Cancer Screening:     General: Screening Not Indicated      Hepatitis C Screening:    General: Screening Current    Screening, Brief Intervention, and Referral to Treatment (SBIRT)    Screening  Typical number of drinks in a day: 0  Typical number of drinks in a week: 0  Interpretation: Low risk drinking behavior.    Single Item Drug Screening:  How often have you used an illegal drug (including marijuana) or a prescription medication for non-medical reasons in the past year? never    Single Item Drug Screen Score: 0  Interpretation: Negative screen for possible drug use disorder    Other Counseling Topics:   Calcium and vitamin D intake and regular weightbearing exercise.     Social Determinants of Health     Financial Resource Strain: Low Risk  (7/17/2023)    Overall Financial Resource Strain (CARDIA)    • Difficulty of Paying Living Expenses: Not hard at all   Transportation Needs: No Transportation Needs (7/17/2023)    PRAPARE - Transportation    • Lack of Transportation (Medical): No    • Lack of Transportation (Non-Medical): No     No results found.    Objective     /80   Pulse 80   Resp 16   Ht 5' 8.5\" (1.74 m)   Wt 81.2 kg (179 lb)   SpO2 97%   BMI 26.82 kg/m²     Physical Exam  Vitals and " nursing note reviewed.   Constitutional:       General: He is not in acute distress.     Appearance: Normal appearance. He is not ill-appearing.   HENT:      Head: Normocephalic and atraumatic.      Right Ear: Tympanic membrane and ear canal normal.      Left Ear: Tympanic membrane and ear canal normal.      Nose: Nose normal. No rhinorrhea.      Mouth/Throat:      Mouth: Mucous membranes are moist.      Pharynx: Oropharynx is clear. No oropharyngeal exudate or posterior oropharyngeal erythema.   Eyes:      Extraocular Movements: Extraocular movements intact.      Conjunctiva/sclera: Conjunctivae normal.      Pupils: Pupils are equal, round, and reactive to light.   Cardiovascular:      Rate and Rhythm: Normal rate and regular rhythm.      Heart sounds: No murmur heard.  Pulmonary:      Effort: Pulmonary effort is normal.      Breath sounds: Normal breath sounds. No wheezing or rales.   Abdominal:      General: Abdomen is flat. Bowel sounds are normal. There is no distension.      Palpations: Abdomen is soft. There is no mass.      Tenderness: There is no abdominal tenderness. There is no guarding.   Musculoskeletal:         General: No swelling, tenderness, deformity or signs of injury. Normal range of motion.      Cervical back: Normal range of motion and neck supple.   Lymphadenopathy:      Cervical: No cervical adenopathy.   Skin:     Coloration: Skin is not jaundiced or pale.      Findings: No rash.   Neurological:      General: No focal deficit present.      Mental Status: He is alert and oriented to person, place, and time.      Motor: No weakness.   Psychiatric:         Mood and Affect: Mood normal.         Behavior: Behavior normal.         Thought Content: Thought content normal.         Judgment: Judgment normal.

## 2025-01-31 DIAGNOSIS — T78.40XS ALLERGIC DISORDER, SEQUELA: ICD-10-CM

## 2025-02-03 RX ORDER — DIPHENHYDRAMINE HYDROCHLORIDE 25 MG/1
25 CAPSULE ORAL
Qty: 30 CAPSULE | Refills: 5 | Status: SHIPPED | OUTPATIENT
Start: 2025-02-03

## 2025-02-10 ENCOUNTER — TELEPHONE (OUTPATIENT)
Age: 70
End: 2025-02-10

## 2025-02-10 NOTE — TELEPHONE ENCOUNTER
Patient would like to know if he can drink Benadryl the night prior to his lab work since its fasting he is afraid it can interfere with his lab results. Please lorin pt and notify him if this is OK .

## 2025-02-13 ENCOUNTER — RESULTS FOLLOW-UP (OUTPATIENT)
Dept: FAMILY MEDICINE CLINIC | Facility: CLINIC | Age: 70
End: 2025-02-13

## 2025-02-13 LAB
ALBUMIN SERPL-MCNC: 4.6 G/DL (ref 3.6–5.1)
ALBUMIN/GLOB SERPL: 1.8 (CALC) (ref 1–2.5)
ALP SERPL-CCNC: 91 U/L (ref 35–144)
ALT SERPL-CCNC: 19 U/L (ref 9–46)
AST SERPL-CCNC: 15 U/L (ref 10–35)
BASOPHILS # BLD AUTO: 46 CELLS/UL (ref 0–200)
BASOPHILS NFR BLD AUTO: 0.6 %
BILIRUB SERPL-MCNC: 0.6 MG/DL (ref 0.2–1.2)
BUN SERPL-MCNC: 10 MG/DL (ref 7–25)
BUN/CREAT SERPL: ABNORMAL (CALC) (ref 6–22)
CALCIUM SERPL-MCNC: 9.7 MG/DL (ref 8.6–10.3)
CHLORIDE SERPL-SCNC: 104 MMOL/L (ref 98–110)
CHOLEST SERPL-MCNC: 166 MG/DL
CHOLEST/HDLC SERPL: 3.4 (CALC)
CO2 SERPL-SCNC: 27 MMOL/L (ref 20–32)
CREAT SERPL-MCNC: 0.84 MG/DL (ref 0.7–1.35)
EOSINOPHIL # BLD AUTO: 77 CELLS/UL (ref 15–500)
EOSINOPHIL NFR BLD AUTO: 1 %
ERYTHROCYTE [DISTWIDTH] IN BLOOD BY AUTOMATED COUNT: 12.8 % (ref 11–15)
GFR/BSA.PRED SERPLBLD CYS-BASED-ARV: 95 ML/MIN/1.73M2
GLOBULIN SER CALC-MCNC: 2.6 G/DL (CALC) (ref 1.9–3.7)
GLUCOSE SERPL-MCNC: 102 MG/DL (ref 65–99)
HBA1C MFR BLD: 6.5 % OF TOTAL HGB
HCT VFR BLD AUTO: 48.7 % (ref 38.5–50)
HDLC SERPL-MCNC: 49 MG/DL
HGB BLD-MCNC: 15.9 G/DL (ref 13.2–17.1)
LDLC SERPL CALC-MCNC: 100 MG/DL (CALC)
LYMPHOCYTES # BLD AUTO: 1717 CELLS/UL (ref 850–3900)
LYMPHOCYTES NFR BLD AUTO: 22.3 %
MCH RBC QN AUTO: 27.9 PG (ref 27–33)
MCHC RBC AUTO-ENTMCNC: 32.6 G/DL (ref 32–36)
MCV RBC AUTO: 85.6 FL (ref 80–100)
MONOCYTES # BLD AUTO: 639 CELLS/UL (ref 200–950)
MONOCYTES NFR BLD AUTO: 8.3 %
NEUTROPHILS # BLD AUTO: 5221 CELLS/UL (ref 1500–7800)
NEUTROPHILS NFR BLD AUTO: 67.8 %
NONHDLC SERPL-MCNC: 117 MG/DL (CALC)
PLATELET # BLD AUTO: 342 THOUSAND/UL (ref 140–400)
PMV BLD REES-ECKER: 9.8 FL (ref 7.5–12.5)
POTASSIUM SERPL-SCNC: 4.2 MMOL/L (ref 3.5–5.3)
PROT SERPL-MCNC: 7.2 G/DL (ref 6.1–8.1)
RBC # BLD AUTO: 5.69 MILLION/UL (ref 4.2–5.8)
SODIUM SERPL-SCNC: 140 MMOL/L (ref 135–146)
TRIGL SERPL-MCNC: 83 MG/DL
WBC # BLD AUTO: 7.7 THOUSAND/UL (ref 3.8–10.8)

## 2025-02-22 DIAGNOSIS — E78.2 MIXED HYPERLIPIDEMIA: ICD-10-CM

## 2025-02-23 RX ORDER — ATORVASTATIN CALCIUM 10 MG/1
10 TABLET, FILM COATED ORAL DAILY
Qty: 90 TABLET | Refills: 1 | Status: SHIPPED | OUTPATIENT
Start: 2025-02-23

## 2025-02-24 ENCOUNTER — OFFICE VISIT (OUTPATIENT)
Dept: FAMILY MEDICINE CLINIC | Facility: CLINIC | Age: 70
End: 2025-02-24
Payer: MEDICARE

## 2025-02-24 VITALS
HEART RATE: 102 BPM | RESPIRATION RATE: 16 BRPM | DIASTOLIC BLOOD PRESSURE: 68 MMHG | BODY MASS INDEX: 25.62 KG/M2 | HEIGHT: 69 IN | WEIGHT: 173 LBS | SYSTOLIC BLOOD PRESSURE: 120 MMHG | OXYGEN SATURATION: 99 %

## 2025-02-24 DIAGNOSIS — Z12.5 ENCOUNTER FOR SCREENING PROSTATE SPECIFIC ANTIGEN (PSA) MEASUREMENT: ICD-10-CM

## 2025-02-24 DIAGNOSIS — E78.2 MIXED HYPERLIPIDEMIA: ICD-10-CM

## 2025-02-24 DIAGNOSIS — J45.20 MILD INTERMITTENT ASTHMA WITHOUT COMPLICATION: ICD-10-CM

## 2025-02-24 DIAGNOSIS — E11.9 TYPE 2 DIABETES MELLITUS WITHOUT COMPLICATION, WITHOUT LONG-TERM CURRENT USE OF INSULIN (HCC): Primary | ICD-10-CM

## 2025-02-24 PROCEDURE — G2211 COMPLEX E/M VISIT ADD ON: HCPCS | Performed by: FAMILY MEDICINE

## 2025-02-24 PROCEDURE — 99214 OFFICE O/P EST MOD 30 MIN: CPT | Performed by: FAMILY MEDICINE

## 2025-02-24 RX ORDER — ALBUTEROL SULFATE 90 UG/1
2 INHALANT RESPIRATORY (INHALATION) EVERY 6 HOURS PRN
Qty: 8.5 G | Refills: 3 | Status: SHIPPED | OUTPATIENT
Start: 2025-02-24

## 2025-02-24 RX ORDER — METFORMIN HYDROCHLORIDE 750 MG/1
750 TABLET, EXTENDED RELEASE ORAL
Qty: 100 TABLET | Refills: 1 | Status: SHIPPED | OUTPATIENT
Start: 2025-02-24

## 2025-02-24 NOTE — ASSESSMENT & PLAN NOTE
Stable, lungs are clear  Orders:  •  albuterol (PROVENTIL HFA,VENTOLIN HFA) 90 mcg/act inhaler; Inhale 2 puffs every 6 (six) hours as needed for wheezing

## 2025-02-24 NOTE — ASSESSMENT & PLAN NOTE
Continue statins, cholesterol slightly increased, advise low-fat diet  Orders:  •  Lipid Panel with Direct LDL reflex; Future

## 2025-02-24 NOTE — PROGRESS NOTES
"Name: Gomez Issa      : 1955      MRN: [id number]  Encounter Provider: Taryn Elkins MD  Encounter Date: 2025   Encounter department: [facility]  :  Assessment & Plan  Type 2 diabetes mellitus without complication, without long-term current use of insulin (HCC)    Lab Results   Component Value Date    HGBA1C 6.5 (H) 2025   New onset diabetes, previously was prediabetic for last few years discussed about low-carb diet and exercise and start metformin    Orders:  •  metFORMIN (GLUCOPHAGE-XR) 750 mg 24 hr tablet; Take 1 tablet (750 mg total) by mouth daily with breakfast  •  Albumin / creatinine urine ratio; Future  •  Comprehensive metabolic panel; Future  •  Hemoglobin A1C; Future  •  Lipid Panel with Direct LDL reflex; Future    Mixed hyperlipidemia  Continue statins, cholesterol slightly increased, advise low-fat diet  Orders:  •  Lipid Panel with Direct LDL reflex; Future    Mild intermittent asthma without complication  Stable, lungs are clear  Orders:  •  albuterol (PROVENTIL HFA,VENTOLIN HFA) 90 mcg/act inhaler; Inhale 2 puffs every 6 (six) hours as needed for wheezing    Encounter for screening prostate specific antigen (PSA) measurement  Discussed with him ,  next labs will get PSA  Orders:  •  PSA, Total Screen; Future           History of Present Illness   Follow-up on labs, he is trying to eat healthy low-fat diet, he says he cannot eat berries because he is allergic to strawberries blueberries.  He is prediabetic for few years, he takes statins for hyperlipidemia, no new symptoms  Asthma stable needed refill      Review of Systems   Constitutional: Negative.    HENT: Negative.     Eyes: Negative.    Respiratory: Negative.     Cardiovascular: Negative.    Gastrointestinal: Negative.    Endocrine: Negative.    Neurological: Negative.    Hematological: Negative.    Psychiatric/Behavioral: Negative.         Objective   /68   Pulse 102   Resp 16   Ht 5' 8.5\" " (1.74 m)   Wt 78.5 kg (173 lb)   SpO2 99%   BMI 25.92 kg/m²      Physical Exam  Vitals and nursing note reviewed.   Constitutional:       Appearance: Normal appearance. He is not toxic-appearing.   HENT:      Head: Normocephalic and atraumatic.   Eyes:      Extraocular Movements: Extraocular movements intact.   Cardiovascular:      Rate and Rhythm: Normal rate.      Heart sounds: No murmur heard.  Pulmonary:      Effort: Pulmonary effort is normal.   Abdominal:      General: Abdomen is flat.   Musculoskeletal:      Cervical back: Normal range of motion.   Skin:     Coloration: Skin is not jaundiced.   Neurological:      General: No focal deficit present.   Psychiatric:         Mood and Affect: Mood normal.

## 2025-04-17 ENCOUNTER — TELEPHONE (OUTPATIENT)
Age: 70
End: 2025-04-17

## 2025-04-17 NOTE — TELEPHONE ENCOUNTER
Pt has been taking metformin since the 2/25. States that he lost 10 pound since starting the medication. States he has also been getting intermittent headaches and nausea. Would like to know if he should continue or if adjustments should be made. Please advise.

## 2025-06-05 DIAGNOSIS — J45.20 MILD INTERMITTENT ASTHMA WITHOUT COMPLICATION: ICD-10-CM

## 2025-06-05 RX ORDER — ALBUTEROL SULFATE 90 UG/1
INHALANT RESPIRATORY (INHALATION)
Qty: 8.5 G | Refills: 3 | Status: SHIPPED | OUTPATIENT
Start: 2025-06-05

## 2025-06-06 DIAGNOSIS — T78.40XS ALLERGIC DISORDER, SEQUELA: ICD-10-CM

## 2025-06-06 RX ORDER — FLUTICASONE PROPIONATE 50 MCG
SPRAY, SUSPENSION (ML) NASAL
Qty: 48 ML | Refills: 1 | Status: SHIPPED | OUTPATIENT
Start: 2025-06-06

## 2025-06-23 ENCOUNTER — TELEPHONE (OUTPATIENT)
Age: 70
End: 2025-06-23

## 2025-06-23 NOTE — TELEPHONE ENCOUNTER
Pt called to ask Dr Elkins's advice.  He is going for fasting bloodwork tomorrow morning.  He wants to know if he can take his Benadryl before bed or if he should skip it.  He takes it for his allergies and to help him sleep.  Please call him to advise.  Thank you!

## 2025-06-25 LAB
ALBUMIN SERPL-MCNC: 4.5 G/DL (ref 3.6–5.1)
ALBUMIN/CREAT UR: 2 MG/G CREAT
ALBUMIN/GLOB SERPL: 1.9 (CALC) (ref 1–2.5)
ALP SERPL-CCNC: 79 U/L (ref 35–144)
ALT SERPL-CCNC: 19 U/L (ref 9–46)
AST SERPL-CCNC: 13 U/L (ref 10–35)
BILIRUB SERPL-MCNC: 0.5 MG/DL (ref 0.2–1.2)
BUN SERPL-MCNC: 14 MG/DL (ref 7–25)
BUN/CREAT SERPL: ABNORMAL (CALC) (ref 6–22)
CALCIUM SERPL-MCNC: 9.4 MG/DL (ref 8.6–10.3)
CHLORIDE SERPL-SCNC: 103 MMOL/L (ref 98–110)
CHOLEST SERPL-MCNC: 166 MG/DL
CHOLEST/HDLC SERPL: 3.2 (CALC)
CO2 SERPL-SCNC: 29 MMOL/L (ref 20–32)
CREAT SERPL-MCNC: 0.78 MG/DL (ref 0.7–1.35)
CREAT UR-MCNC: 90 MG/DL (ref 20–320)
GFR/BSA.PRED SERPLBLD CYS-BASED-ARV: 97 ML/MIN/1.73M2
GLOBULIN SER CALC-MCNC: 2.4 G/DL (CALC) (ref 1.9–3.7)
GLUCOSE SERPL-MCNC: 104 MG/DL (ref 65–99)
HBA1C MFR BLD: 6.3 %
HDLC SERPL-MCNC: 52 MG/DL
LDLC SERPL CALC-MCNC: 98 MG/DL (CALC)
MICROALBUMIN UR-MCNC: 0.2 MG/DL
NONHDLC SERPL-MCNC: 114 MG/DL (CALC)
POTASSIUM SERPL-SCNC: 4.5 MMOL/L (ref 3.5–5.3)
PROT SERPL-MCNC: 6.9 G/DL (ref 6.1–8.1)
PSA SERPL-MCNC: 0.52 NG/ML
SODIUM SERPL-SCNC: 139 MMOL/L (ref 135–146)
TRIGL SERPL-MCNC: 69 MG/DL

## 2025-07-01 ENCOUNTER — RA CDI HCC (OUTPATIENT)
Dept: OTHER | Facility: HOSPITAL | Age: 70
End: 2025-07-01

## 2025-07-05 DIAGNOSIS — T78.40XS ALLERGIC DISORDER, SEQUELA: ICD-10-CM

## 2025-07-07 ENCOUNTER — OFFICE VISIT (OUTPATIENT)
Dept: FAMILY MEDICINE CLINIC | Facility: CLINIC | Age: 70
End: 2025-07-07

## 2025-07-07 VITALS
HEIGHT: 69 IN | BODY MASS INDEX: 26.51 KG/M2 | OXYGEN SATURATION: 99 % | SYSTOLIC BLOOD PRESSURE: 124 MMHG | WEIGHT: 179 LBS | DIASTOLIC BLOOD PRESSURE: 74 MMHG | RESPIRATION RATE: 16 BRPM | HEART RATE: 98 BPM

## 2025-07-07 DIAGNOSIS — Z00.00 MEDICARE ANNUAL WELLNESS VISIT, SUBSEQUENT: Primary | ICD-10-CM

## 2025-07-07 DIAGNOSIS — E78.2 MIXED HYPERLIPIDEMIA: ICD-10-CM

## 2025-07-07 DIAGNOSIS — E11.9 TYPE 2 DIABETES MELLITUS WITHOUT COMPLICATION, WITHOUT LONG-TERM CURRENT USE OF INSULIN (HCC): ICD-10-CM

## 2025-07-07 RX ORDER — FLUTICASONE PROPIONATE 50 MCG
SPRAY, SUSPENSION (ML) NASAL
Qty: 48 ML | Refills: 1 | Status: SHIPPED | OUTPATIENT
Start: 2025-07-07

## 2025-07-07 NOTE — ASSESSMENT & PLAN NOTE
Cholesterol improved, continue Lipitor  Orders:  •  Comprehensive metabolic panel; Future  •  Hemoglobin A1C; Future  •  Lipid Panel with Direct LDL reflex; Future

## 2025-07-07 NOTE — PATIENT INSTRUCTIONS
Medicare Preventive Visit Patient Instructions  Thank you for completing your Welcome to Medicare Visit or Medicare Annual Wellness Visit today. Your next wellness visit will be due in one year (7/8/2026).  The screening/preventive services that you may require over the next 5-10 years are detailed below. Some tests may not apply to you based off risk factors and/or age. Screening tests ordered at today's visit but not completed yet may show as past due. Also, please note that scanned in results may not display below.  Preventive Screenings:  Service Recommendations Previous Testing/Comments   Colorectal Cancer Screening  Colonoscopy    Fecal Occult Blood Test (FOBT)/Fecal Immunochemical Test (FIT)  Fecal DNA/Cologuard Test  Flexible Sigmoidoscopy Age: 45-75 years old   Colonoscopy: every 10 years (May be performed more frequently if at higher risk)  OR  FOBT/FIT: every 1 year  OR  Cologuard: every 3 years  OR  Sigmoidoscopy: every 5 years  Screening may be recommended earlier than age 45 if at higher risk for colorectal cancer. Also, an individualized decision between you and your healthcare provider will decide whether screening between the ages of 76-85 would be appropriate. Colonoscopy: 04/27/2022  FOBT/FIT: Not on file  Cologuard: Not on file  Sigmoidoscopy: Not on file    Screening Current     Prostate Cancer Screening Individualized decision between patient and health care provider in men between ages of 55-69   Medicare will cover every 12 months beginning on the day after your 50th birthday PSA: 0.52 ng/mL     Screening Current     Hepatitis C Screening Once for adults born between 1945 and 1965  More frequently in patients at high risk for Hepatitis C Hep C Antibody: 09/09/2022    Screening Current   Diabetes Screening 1-2 times per year if you're at risk for diabetes or have pre-diabetes Fasting glucose: No results in last 5 years (No results in last 5 years)  A1C: 6.3 % (6/24/2025)  Screening Not  Indicated  History Diabetes   Cholesterol Screening Once every 5 years if you don't have a lipid disorder. May order more often based on risk factors. Lipid panel: 06/24/2025  Screening Not Indicated  History Lipid Disorder      Other Preventive Screenings Covered by Medicare:  Abdominal Aortic Aneurysm (AAA) Screening: covered once if your at risk. You're considered to be at risk if you have a family history of AAA or a male between the age of 65-75 who smoking at least 100 cigarettes in your lifetime.  Lung Cancer Screening: covers low dose CT scan once per year if you meet all of the following conditions: (1) Age 55-77; (2) No signs or symptoms of lung cancer; (3) Current smoker or have quit smoking within the last 15 years; (4) You have a tobacco smoking history of at least 20 pack years (packs per day x number of years you smoked); (5) You get a written order from a healthcare provider.  Glaucoma Screening: covered annually if you're considered high risk: (1) You have diabetes OR (2) Family history of glaucoma OR (3)  aged 50 and older OR (4)  American aged 65 and older  Osteoporosis Screening: covered every 2 years if you meet one of the following conditions: (1) Have a vertebral abnormality; (2) On glucocorticoid therapy for more than 3 months; (3) Have primary hyperparathyroidism; (4) On osteoporosis medications and need to assess response to drug therapy.  HIV Screening: covered annually if you're between the age of 15-65. Also covered annually if you are younger than 15 and older than 65 with risk factors for HIV infection. For pregnant patients, it is covered up to 3 times per pregnancy.    Immunizations:  Immunization Recommendations   Influenza Vaccine Annual influenza vaccination during flu season is recommended for all persons aged >= 6 months who do not have contraindications   Pneumococcal Vaccine   * Pneumococcal conjugate vaccine = PCV13 (Prevnar 13), PCV15 (Vaxneuvance),  PCV20 (Prevnar 20)  * Pneumococcal polysaccharide vaccine = PPSV23 (Pneumovax) Adults 19-63 yo with certain risk factors or if 65+ yo  If never received any pneumonia vaccine: recommend Prevnar 20 (PCV20)  Give PCV20 if previously received 1 dose of PCV13 or PPSV23   Hepatitis B Vaccine 3 dose series if at intermediate or high risk (ex: diabetes, end stage renal disease, liver disease)   Respiratory syncytial virus (RSV) Vaccine - COVERED BY MEDICARE PART D  * RSVPreF3 (Arexvy) CDC recommends that adults 60 years of age and older may receive a single dose of RSV vaccine using shared clinical decision-making (SCDM)   Tetanus (Td) Vaccine - COST NOT COVERED BY MEDICARE PART B Following completion of primary series, a booster dose should be given every 10 years to maintain immunity against tetanus. Td may also be given as tetanus wound prophylaxis.   Tdap Vaccine - COST NOT COVERED BY MEDICARE PART B Recommended at least once for all adults. For pregnant patients, recommended with each pregnancy.   Shingles Vaccine (Shingrix) - COST NOT COVERED BY MEDICARE PART B  2 shot series recommended in those 19 years and older who have or will have weakened immune systems or those 50 years and older     Health Maintenance Due:      Topic Date Due   • Colorectal Cancer Screening  04/27/2025   • Hepatitis C Screening  Completed     Immunizations Due:      Topic Date Due   • COVID-19 Vaccine (5 - 2024-25 season) 03/20/2025   • Influenza Vaccine (1) 09/01/2025     Advance Directives   What are advance directives?  Advance directives are legal documents that state your wishes and plans for medical care. These plans are made ahead of time in case you lose your ability to make decisions for yourself. Advance directives can apply to any medical decision, such as the treatments you want, and if you want to donate organs.   What are the types of advance directives?  There are many types of advance directives, and each state has rules  about how to use them. You may choose a combination of any of the following:  Living will:  This is a written record of the treatment you want. You can also choose which treatments you do not want, which to limit, and which to stop at a certain time. This includes surgery, medicine, IV fluid, and tube feedings.   Durable power of  for healthcare (DPAHC):  This is a written record that states who you want to make healthcare choices for you when you are unable to make them for yourself. This person, called a proxy, is usually a family member or a friend. You may choose more than 1 proxy.  Do not resuscitate (DNR) order:  A DNR order is used in case your heart stops beating or you stop breathing. It is a request not to have certain forms of treatment, such as CPR. A DNR order may be included in other types of advance directives.  Medical directive:  This covers the care that you want if you are in a coma, near death, or unable to make decisions for yourself. You can list the treatments you want for each condition. Treatment may include pain medicine, surgery, blood transfusions, dialysis, IV or tube feedings, and a ventilator (breathing machine).  Values history:  This document has questions about your views, beliefs, and how you feel and think about life. This information can help others choose the care that you would choose.  Why are advance directives important?  An advance directive helps you control your care. Although spoken wishes may be used, it is better to have your wishes written down. Spoken wishes can be misunderstood, or not followed. Treatments may be given even if you do not want them. An advance directive may make it easier for your family to make difficult choices about your care.   Weight Management   Why it is important to manage your weight:  Being overweight increases your risk of health conditions such as heart disease, high blood pressure, type 2 diabetes, and certain types of cancer. It  can also increase your risk for osteoarthritis, sleep apnea, and other respiratory problems. Aim for a slow, steady weight loss. Even a small amount of weight loss can lower your risk of health problems.  How to lose weight safely:  A safe and healthy way to lose weight is to eat fewer calories and get regular exercise. You can lose up about 1 pound a week by decreasing the number of calories you eat by 500 calories each day.   Healthy meal plan for weight management:  A healthy meal plan includes a variety of foods, contains fewer calories, and helps you stay healthy. A healthy meal plan includes the following:  Eat whole-grain foods more often.  A healthy meal plan should contain fiber. Fiber is the part of grains, fruits, and vegetables that is not broken down by your body. Whole-grain foods are healthy and provide extra fiber in your diet. Some examples of whole-grain foods are whole-wheat breads and pastas, oatmeal, brown rice, and bulgur.  Eat a variety of vegetables every day.  Include dark, leafy greens such as spinach, kale, santosh greens, and mustard greens. Eat yellow and orange vegetables such as carrots, sweet potatoes, and winter squash.   Eat a variety of fruits every day.  Choose fresh or canned fruit (canned in its own juice or light syrup) instead of juice. Fruit juice has very little or no fiber.  Eat low-fat dairy foods.  Drink fat-free (skim) milk or 1% milk. Eat fat-free yogurt and low-fat cottage cheese. Try low-fat cheeses such as mozzarella and other reduced-fat cheeses.  Choose meat and other protein foods that are low in fat.  Choose beans or other legumes such as split peas or lentils. Choose fish, skinless poultry (chicken or turkey), or lean cuts of red meat (beef or pork). Before you cook meat or poultry, cut off any visible fat.   Use less fat and oil.  Try baking foods instead of frying them. Add less fat, such as margarine, sour cream, regular salad dressing and mayonnaise to  foods. Eat fewer high-fat foods. Some examples of high-fat foods include french fries, doughnuts, ice cream, and cakes.  Eat fewer sweets.  Limit foods and drinks that are high in sugar. This includes candy, cookies, regular soda, and sweetened drinks.  Exercise:  Exercise at least 30 minutes per day on most days of the week. Some examples of exercise include walking, biking, dancing, and swimming. You can also fit in more physical activity by taking the stairs instead of the elevator or parking farther away from stores. Ask your healthcare provider about the best exercise plan for you.    © Copyright [employer] 2018 Information is for End User's use only and may not be sold, redistributed or otherwise used for commercial purposes. All illustrations and images included in CareNotes® are the copyrighted property of [employer]., Inc[employer] or [employer]

## 2025-07-07 NOTE — PROGRESS NOTES
Name: Gomez Issa      : 1955      MRN: [id number]  Encounter Provider: Taryn Elkins MD  Encounter Date: 2025   Encounter department: [facility]  :  Assessment & Plan  Medicare annual wellness visit, subsequent  He is up-to-date on shingles vaccine       BMI 26.0-26.9,adult  Continue low-fat low-carb diet       Mixed hyperlipidemia  Cholesterol improved, continue Lipitor  Orders:  •  Comprehensive metabolic panel; Future  •  Hemoglobin A1C; Future  •  Lipid Panel with Direct LDL reflex; Future    Type 2 diabetes mellitus without complication, without long-term current use of insulin (HCC)  Diabetes improved, continue metformin 750 mg daily and labs in 4 months  Lab Results   Component Value Date    HGBA1C 6.3 (H) 2025       Orders:  •  Comprehensive metabolic panel; Future  •  Hemoglobin A1C; Future  Follow-up in 4-month     Preventive health issues were discussed with patient, and age appropriate screening tests were ordered as noted in patient's After Visit Summary. Personalized health advice and appropriate referrals for health education or preventive services given if needed, as noted in patient's After Visit Summary.    History of Present Illness     AWV , follow up , he is taking metformin since last time and taking Lipitor, his cholesterol and blood sugar improved he is trying to eat healthy and remain active, no new symptoms and allergies are under control,  He takes Ambien sometimes only       Patient Care Team:  Taryn Elkins MD as PCP - General    Review of Systems   Constitutional:  Negative for activity change, appetite change, chills, fatigue, fever and unexpected weight change.   HENT:  Negative for congestion, ear discharge, ear pain, nosebleeds, postnasal drip, rhinorrhea, sinus pressure, sneezing, sore throat, trouble swallowing and voice change.    Eyes:  Negative for photophobia, pain, discharge, redness and itching.   Respiratory:  Negative for cough,  chest tightness, shortness of breath and wheezing.    Cardiovascular:  Negative for chest pain, palpitations and leg swelling.   Gastrointestinal:  Negative for abdominal pain, constipation, diarrhea, nausea and vomiting.   Endocrine: Negative for polyuria.   Genitourinary:  Negative for dysuria, frequency and urgency.   Musculoskeletal:  Negative for arthralgias, back pain, myalgias and neck pain.   Skin:  Negative for color change, pallor and rash.   Allergic/Immunologic: Negative for environmental allergies and food allergies.   Neurological:  Negative for dizziness, weakness, light-headedness and headaches.   Hematological:  Negative for adenopathy. Does not bruise/bleed easily.   Psychiatric/Behavioral:  Negative for behavioral problems and sleep disturbance. The patient is not nervous/anxious.      Medical History Reviewed by provider this encounter:  Tobacco  Allergies  Meds  Problems  Med Hx  Surg Hx  Fam Hx       Annual Wellness Visit Questionnaire   Gomez is here for his Subsequent Wellness visit.     Health Risk Assessment:   Patient rates overall health as good. Patient feels that their physical health rating is same. Patient is satisfied with their life. Eyesight was rated as same. Hearing was rated as same. Patient feels that their emotional and mental health rating is same. Patients states they are never, rarely angry. Patient states they are never, rarely unusually tired/fatigued. Pain experienced in the last 7 days has been some. Patient's pain rating has been 8/10. Neck pain and follow spine specialist and gets injection in [place],and PT     Depression Screening:   PHQ-2 Score: 0      Fall Risk Screening:   In the past year, patient has experienced: no history of falling in past year      Home Safety:  Patient does not have trouble with stairs inside or outside of their home. Patient has working smoke alarms and has working carbon monoxide detector. Home safety hazards include: none.      Nutrition:   Current diet is Regular.     Medications:   Patient is currently taking over-the-counter supplements. OTC medications include: see medication list. Patient is able to manage medications.     Activities of Daily Living (ADLs)/Instrumental Activities of Daily Living (IADLs):   Walk and transfer into and out of bed and chair?: Yes  Dress and groom yourself?: Yes    Bathe or shower yourself?: Yes    Feed yourself? Yes  Do your laundry/housekeeping?: Yes  Manage your money, pay your bills and track your expenses?: Yes  Make your own meals?: Yes    Do your own shopping?: Yes    Previous Hospitalizations:   Any hospitalizations or ED visits within the last 12 months?: No      Advance Care Planning:   Living will: Yes    Durable POA for healthcare: Yes    Advanced directive: Yes      Comments: Lives with wife ,    Cognitive Screening:   Provider or family/friend/caregiver concerned regarding cognition?: Yes    Preventive Screenings      Cardiovascular Screening:    General: History Lipid Disorder and Screening Current      Diabetes Screening:     General: History Diabetes and Screening Current      Colorectal Cancer Screening:     General: Screening Current      Prostate Cancer Screening:    General: Screening Current      Osteoporosis Screening:    General: Screening Not Indicated      Abdominal Aortic Aneurysm (AAA) Screening:    Risk factors include: age between 65-74 yo        Lung Cancer Screening:     General: Screening Not Indicated      Hepatitis C Screening:    General: Screening Current    Screening, Brief Intervention, and Referral to Treatment (SBIRT)     Screening  Typical number of drinks in a day: 0  Typical number of drinks in a week: 0  Interpretation: Low risk drinking behavior.    Single Item Drug Screening:  How often have you used an illegal drug (including marijuana) or a prescription medication for non-medical reasons in the past year? never    Single Item Drug Screen Score:  "0  Interpretation: Negative screen for possible drug use disorder    Other Counseling Topics:   Calcium and vitamin D intake and regular weightbearing exercise.     Social Drivers of Health     Financial Resource Strain: Low Risk  (7/17/2023)    Overall Financial Resource Strain (CARDIA)    • Difficulty of Paying Living Expenses: Not hard at all   Food Insecurity: No Food Insecurity (7/7/2025)    Nursing - Inadequate Food Risk Classification    • Worried About Running Out of Food in the Last Year: Never true    • Ran Out of Food in the Last Year: Never true   Transportation Needs: No Transportation Needs (7/7/2025)    PRAPARE - Transportation    • Lack of Transportation (Medical): No    • Lack of Transportation (Non-Medical): No   Housing Stability: Low Risk  (7/7/2025)    Housing Stability Vital Sign    • Unable to Pay for Housing in the Last Year: No    • Number of Times Moved in the Last Year: 0    • Homeless in the Last Year: No   Utilities: Not At Risk (7/7/2025)    [facility] Utilities    • Threatened with loss of utilities: No     No results found.    Objective   /74   Pulse 98   Resp 16   Ht 5' 8.5\" (1.74 m)   Wt 81.2 kg (179 lb)   SpO2 99%   BMI 26.82 kg/m²     Physical Exam  Vitals and nursing note reviewed.   Constitutional:       General: He is not in acute distress.     Appearance: Normal appearance.   HENT:      Head: Normocephalic and atraumatic.      Right Ear: Tympanic membrane and ear canal normal. There is no impacted cerumen.      Left Ear: Tympanic membrane and ear canal normal. There is no impacted cerumen.      Nose: Nose normal. No rhinorrhea.      Mouth/Throat:      Mouth: Mucous membranes are moist.      Pharynx: Oropharynx is clear. No oropharyngeal exudate or posterior oropharyngeal erythema.     Eyes:      Extraocular Movements: Extraocular movements intact.      Conjunctiva/sclera: Conjunctivae normal.      Pupils: Pupils are equal, round, and reactive to light.     Neck:      " Vascular: No carotid bruit.     Cardiovascular:      Rate and Rhythm: Normal rate and regular rhythm.      Heart sounds: No murmur heard.  Pulmonary:      Effort: Pulmonary effort is normal.      Breath sounds: Normal breath sounds. No wheezing or rales.   Abdominal:      General: Abdomen is flat. Bowel sounds are normal. There is no distension.      Palpations: Abdomen is soft. There is no mass.      Tenderness: There is no abdominal tenderness. There is no guarding.     Musculoskeletal:         General: No swelling, tenderness, deformity or signs of injury. Normal range of motion.      Cervical back: Normal range of motion and neck supple.     Skin:     Coloration: Skin is not jaundiced or pale.      Findings: No rash.     Neurological:      General: No focal deficit present.      Mental Status: He is alert and oriented to person, place, and time.      Motor: No weakness.     Psychiatric:         Mood and Affect: Mood normal.         Behavior: Behavior normal.         Thought Content: Thought content normal.         Judgment: Judgment normal.

## 2025-07-08 ENCOUNTER — TELEPHONE (OUTPATIENT)
Age: 70
End: 2025-07-08

## 2025-07-08 NOTE — TELEPHONE ENCOUNTER
Call from patient requesting all of his blood work results to be faxed to his neurologist, Dr. Curly George, at Fax: [phone]    Dr. George office phone number is [phone].    Please call him when everything has been sent. Thank you!

## 2025-07-29 ENCOUNTER — ANESTHESIA (OUTPATIENT)
Dept: GASTROENTEROLOGY | Facility: HOSPITAL | Age: 70
End: 2025-07-29
Payer: MEDICARE

## 2025-07-29 ENCOUNTER — HOSPITAL ENCOUNTER (OUTPATIENT)
Dept: GASTROENTEROLOGY | Facility: HOSPITAL | Age: 70
Setting detail: OUTPATIENT SURGERY
Discharge: HOME | End: 2025-07-29
Attending: INTERNAL MEDICINE
Payer: MEDICARE

## 2025-07-29 ENCOUNTER — ANESTHESIA EVENT (OUTPATIENT)
Dept: GASTROENTEROLOGY | Facility: HOSPITAL | Age: 70
End: 2025-07-29
Payer: MEDICARE

## 2025-07-29 VITALS
OXYGEN SATURATION: 99 % | HEART RATE: 70 BPM | RESPIRATION RATE: 18 BRPM | SYSTOLIC BLOOD PRESSURE: 128 MMHG | DIASTOLIC BLOOD PRESSURE: 79 MMHG | HEIGHT: 68 IN | BODY MASS INDEX: 26.37 KG/M2 | TEMPERATURE: 97.8 F | WEIGHT: 174 LBS

## 2025-07-29 DIAGNOSIS — Z12.11 ENCOUNTER FOR SCREENING FOR MALIGNANT NEOPLASM OF COLON: ICD-10-CM

## 2025-07-29 PROCEDURE — 88305 TISSUE EXAM BY PATHOLOGIST: CPT | Performed by: PATHOLOGY

## 2025-07-29 RX ORDER — SODIUM CHLORIDE, SODIUM LACTATE, POTASSIUM CHLORIDE, CALCIUM CHLORIDE 600; 310; 30; 20 MG/100ML; MG/100ML; MG/100ML; MG/100ML
INJECTION, SOLUTION INTRAVENOUS CONTINUOUS PRN
Status: DISCONTINUED | OUTPATIENT
Start: 2025-07-29 | End: 2025-07-29

## 2025-07-29 RX ORDER — PROPOFOL 10 MG/ML
INJECTION, EMULSION INTRAVENOUS AS NEEDED
Status: DISCONTINUED | OUTPATIENT
Start: 2025-07-29 | End: 2025-07-29

## 2025-07-29 RX ORDER — LIDOCAINE HYDROCHLORIDE 10 MG/ML
INJECTION, SOLUTION EPIDURAL; INFILTRATION; INTRACAUDAL; PERINEURAL AS NEEDED
Status: DISCONTINUED | OUTPATIENT
Start: 2025-07-29 | End: 2025-07-29

## 2025-07-29 RX ADMIN — PROPOFOL 30 MG: 10 INJECTION, EMULSION INTRAVENOUS at 09:32

## 2025-07-29 RX ADMIN — PROPOFOL 130 MG: 10 INJECTION, EMULSION INTRAVENOUS at 09:18

## 2025-07-29 RX ADMIN — PROPOFOL 50 MG: 10 INJECTION, EMULSION INTRAVENOUS at 09:23

## 2025-07-29 RX ADMIN — SODIUM CHLORIDE, SODIUM LACTATE, POTASSIUM CHLORIDE, AND CALCIUM CHLORIDE: .6; .31; .03; .02 INJECTION, SOLUTION INTRAVENOUS at 07:56

## 2025-07-29 RX ADMIN — LIDOCAINE HYDROCHLORIDE 50 MG: 10 INJECTION, SOLUTION EPIDURAL; INFILTRATION; INTRACAUDAL at 09:18

## 2025-07-29 RX ADMIN — PROPOFOL 50 MG: 10 INJECTION, EMULSION INTRAVENOUS at 09:27

## 2025-07-29 RX ADMIN — PROPOFOL 50 MG: 10 INJECTION, EMULSION INTRAVENOUS at 09:21

## 2025-08-01 PROCEDURE — 88305 TISSUE EXAM BY PATHOLOGIST: CPT | Performed by: PATHOLOGY

## 2025-08-06 PROBLEM — Z00.00 MEDICARE ANNUAL WELLNESS VISIT, SUBSEQUENT: Status: RESOLVED | Noted: 2019-01-14 | Resolved: 2025-08-06

## 2025-08-16 DIAGNOSIS — E11.9 TYPE 2 DIABETES MELLITUS WITHOUT COMPLICATION, WITHOUT LONG-TERM CURRENT USE OF INSULIN (HCC): ICD-10-CM

## 2025-08-16 DIAGNOSIS — E78.2 MIXED HYPERLIPIDEMIA: ICD-10-CM

## 2025-08-18 RX ORDER — ATORVASTATIN CALCIUM 10 MG/1
10 TABLET, FILM COATED ORAL DAILY
Qty: 90 TABLET | Refills: 1 | Status: SHIPPED | OUTPATIENT
Start: 2025-08-18

## 2025-08-18 RX ORDER — METFORMIN HYDROCHLORIDE 750 MG/1
750 TABLET, EXTENDED RELEASE ORAL
Qty: 90 TABLET | Refills: 1 | Status: SHIPPED | OUTPATIENT
Start: 2025-08-18

## 2025-08-27 PROBLEM — E11.9 DIABETES MELLITUS, TYPE 2 (HCC): Status: ACTIVE | Noted: 2025-08-27
